# Patient Record
Sex: MALE | Race: WHITE
[De-identification: names, ages, dates, MRNs, and addresses within clinical notes are randomized per-mention and may not be internally consistent; named-entity substitution may affect disease eponyms.]

---

## 2018-04-30 ENCOUNTER — HOSPITAL (OUTPATIENT)
Dept: OTHER | Age: 83
End: 2018-04-30
Attending: SURGERY

## 2018-04-30 LAB
AMORPH SED URNS QL MICRO: ABNORMAL
AMPHETAMINES UR QL SCN>500 NG/ML: NEGATIVE
ANALYZER ANC (IANC): ABNORMAL
ANION GAP SERPL CALC-SCNC: 11 MMOL/L (ref 10–20)
APPEARANCE UR: CLEAR
APTT PPP: 27 SECONDS (ref 22–30)
APTT PPP: NORMAL S
BACTERIA #/AREA URNS HPF: ABNORMAL /HPF
BARBITURATES UR QL SCN>200 NG/ML: NEGATIVE
BASE DEFICIT BLDA-SCNC: ABNORMAL MMOL/L
BASE EXCESS BLDA CALC-SCNC: 1 MMOL/L (ref 0–3)
BASOPHILS # BLD: 0 THOUSAND/MCL (ref 0–0.3)
BASOPHILS NFR BLD: 0 %
BDY SITE: ABNORMAL
BENZODIAZ UR QL SCN>200 NG/ML: NEGATIVE
BILIRUB UR QL: NEGATIVE
BODY TEMPERATURE: 37 DEGREES
BUN SERPL-MCNC: 22 MG/DL (ref 6–20)
BUN/CREAT SERPL: 23 (ref 7–25)
BZE UR QL SCN>150 NG/ML: NEGATIVE
CA-I BLD ISE-SCNC: 1.21 MMOL/L (ref 1.15–1.29)
CA-I BLDA-SCNC: 16 % (ref 15–23)
CALCIUM SERPL-MCNC: 9.5 MG/DL (ref 8.4–10.2)
CANNABINOIDS UR QL SCN>50 NG/ML: NEGATIVE
CAOX CRY URNS QL MICRO: ABNORMAL
CHLORIDE BLD-SCNC: 103 MMOL/L (ref 98–107)
CHLORIDE: 101 MMOL/L (ref 98–107)
CK SERPL-CCNC: 115 UNIT/L (ref 39–308)
CO2 SERPL-SCNC: 31 MMOL/L (ref 21–32)
COHGB MFR BLD: 1.2 %
COLOR UR: COLORLESS
CONDITION: ABNORMAL
CONDITION: ABNORMAL
CREAT SERPL-MCNC: 0.96 MG/DL (ref 0.67–1.17)
DIFFERENTIAL METHOD BLD: ABNORMAL
EOSINOPHIL # BLD: 0.3 THOUSAND/MCL (ref 0.1–0.5)
EOSINOPHIL NFR BLD: 4 %
EPITH CASTS #/AREA URNS LPF: ABNORMAL /[LPF]
ERYTHROCYTE [DISTWIDTH] IN BLOOD: 13.1 % (ref 11–15)
ETHANOL SERPL-MCNC: 240 MG/DL
FATTY CASTS #/AREA URNS LPF: ABNORMAL /[LPF]
GLUCOSE BLD-MCNC: 97 MG/DL (ref 65–99)
GLUCOSE SERPL-MCNC: 94 MG/DL (ref 65–99)
GLUCOSE UR-MCNC: NEGATIVE MG/DL
GRAN CASTS #/AREA URNS LPF: ABNORMAL /[LPF]
HCO3 BLDA-SCNC: 26 MMOL/L (ref 22–28)
HEMATOCRIT: 36.3 % (ref 39–51)
HGB BLD-MCNC: 11.6 GM/DL (ref 13–17)
HGB BLD-MCNC: 11.7 GM/DL (ref 13–17)
HGB UR QL: ABNORMAL
HOROWITZ INDEX BLD+IHG-RTO: 28 %
HYALINE CASTS #/AREA URNS LPF: ABNORMAL /[LPF]
INR PPP: 1.1
KETONES UR-MCNC: NEGATIVE MG/DL
LACTATE BLDA-MCNC: 1.5 MMOL/L
LEUKOCYTE ESTERASE UR QL STRIP: NEGATIVE
LYMPHOCYTES # BLD: 1.4 THOUSAND/MCL (ref 1–4)
LYMPHOCYTES NFR BLD: 18 %
MCH RBC QN AUTO: 29.1 PG (ref 26–34)
MCHC RBC AUTO-ENTMCNC: 32 GM/DL (ref 32–36.5)
MCV RBC AUTO: 91.2 FL (ref 78–100)
METHADONE UR QL SCN>300 NG/ML: NEGATIVE NG/ML
METHGB MFR BLD: 0.8 %
MIXED CELL CASTS #/AREA URNS LPF: ABNORMAL /[LPF]
MONOCYTES # BLD: 0.9 THOUSAND/MCL (ref 0.3–0.9)
MONOCYTES NFR BLD: 11 %
MUCOUS THREADS URNS QL MICRO: ABNORMAL
NEUTROPHILS # BLD: 5.2 THOUSAND/MCL (ref 1.8–7.7)
NEUTROPHILS NFR BLD: 67 %
NEUTS SEG NFR BLD: ABNORMAL %
NITRITE UR QL: NEGATIVE
OPIATES UR QL SCN>300 NG/ML: NEGATIVE
OXYHGB MFR BLD: 96 % (ref 94–98)
PCO2 BLDA: 41 MM HG (ref 35–48)
PCP UR QL SCN>25 NG/ML: NEGATIVE
PERCENT NRBC: ABNORMAL
PH BLDA: 7.42 UNIT (ref 7.35–7.45)
PH UR: 5 UNIT (ref 5–7)
PLATELET # BLD: 244 THOUSAND/MCL (ref 140–450)
PO2 BLDA: 106 MM HG (ref 83–108)
POTASSIUM BLD-SCNC: 4.1 MMOL/L (ref 3.4–5.1)
POTASSIUM SERPL-SCNC: 4.1 MMOL/L (ref 3.4–5.1)
PROT UR QL: NEGATIVE MG/DL
PROTHROMBIN TIME: 10.9 SECONDS (ref 9.7–11.8)
PROTHROMBIN TIME: NORMAL
RBC # BLD: 3.98 MILLION/MCL (ref 4.5–5.9)
RBC #/AREA URNS HPF: ABNORMAL /HPF (ref 0–3)
RBC CASTS #/AREA URNS LPF: ABNORMAL /[LPF]
RENAL EPI CELLS #/AREA URNS HPF: ABNORMAL /[HPF]
SAO2 % BLDA: 98 % (ref 95–99)
SODIUM BLD-SCNC: 140 MMOL/L (ref 135–145)
SODIUM SERPL-SCNC: 139 MMOL/L (ref 135–145)
SP GR UR: <1.005 (ref 1–1.03)
SPECIMEN SOURCE: ABNORMAL
SPERM URNS QL MICRO: ABNORMAL
SQUAMOUS #/AREA URNS HPF: ABNORMAL /HPF (ref 0–5)
T VAGINALIS URNS QL MICRO: ABNORMAL
TRI-PHOS CRY URNS QL MICRO: ABNORMAL
TROPONIN I SERPL HS-MCNC: <0.02 NG/ML
URATE CRY URNS QL MICRO: ABNORMAL
URINE REFLEX: ABNORMAL
URNS CMNT MICRO: ABNORMAL
UROBILINOGEN UR QL: 0.2 MG/DL (ref 0–1)
WAXY CASTS #/AREA URNS LPF: ABNORMAL /[LPF]
WBC # BLD: 7.8 THOUSAND/MCL (ref 4.2–11)
WBC #/AREA URNS HPF: ABNORMAL /HPF (ref 0–5)
WBC CASTS #/AREA URNS LPF: ABNORMAL /[LPF]
YEAST HYPHAE URNS QL MICRO: ABNORMAL
YEAST URNS QL MICRO: ABNORMAL

## 2018-05-01 LAB
ANALYZER ANC (IANC): ABNORMAL
ANION GAP SERPL CALC-SCNC: 10 MMOL/L (ref 10–20)
BASOPHILS # BLD: 0 THOUSAND/MCL (ref 0–0.3)
BASOPHILS NFR BLD: 1 %
BUN SERPL-MCNC: 17 MG/DL (ref 6–20)
BUN/CREAT SERPL: 22 (ref 7–25)
CALCIUM SERPL-MCNC: 8.7 MG/DL (ref 8.4–10.2)
CHLORIDE: 101 MMOL/L (ref 98–107)
CO2 SERPL-SCNC: 29 MMOL/L (ref 21–32)
CREAT SERPL-MCNC: 0.77 MG/DL (ref 0.67–1.17)
DIFFERENTIAL METHOD BLD: ABNORMAL
EOSINOPHIL # BLD: 0.4 THOUSAND/MCL (ref 0.1–0.5)
EOSINOPHIL NFR BLD: 7 %
ERYTHROCYTE [DISTWIDTH] IN BLOOD: 13 % (ref 11–15)
GLUCOSE SERPL-MCNC: 86 MG/DL (ref 65–99)
HEMATOCRIT: 35.2 % (ref 39–51)
HGB BLD-MCNC: 11.3 GM/DL (ref 13–17)
LYMPHOCYTES # BLD: 0.9 THOUSAND/MCL (ref 1–4)
LYMPHOCYTES NFR BLD: 16 %
MCH RBC QN AUTO: 29 PG (ref 26–34)
MCHC RBC AUTO-ENTMCNC: 32.1 GM/DL (ref 32–36.5)
MCV RBC AUTO: 90.5 FL (ref 78–100)
MONOCYTES # BLD: 0.7 THOUSAND/MCL (ref 0.3–0.9)
MONOCYTES NFR BLD: 12 %
NEUTROPHILS # BLD: 3.7 THOUSAND/MCL (ref 1.8–7.7)
NEUTROPHILS NFR BLD: 64 %
NEUTS SEG NFR BLD: ABNORMAL %
PERCENT NRBC: ABNORMAL
PLATELET # BLD: 221 THOUSAND/MCL (ref 140–450)
POTASSIUM SERPL-SCNC: 3.9 MMOL/L (ref 3.4–5.1)
RBC # BLD: 3.89 MILLION/MCL (ref 4.5–5.9)
SODIUM SERPL-SCNC: 136 MMOL/L (ref 135–145)
WBC # BLD: 5.8 THOUSAND/MCL (ref 4.2–11)

## 2018-08-03 LAB
ALBUMIN SERPL-MCNC: 3.4 GM/DL (ref 3.6–5.1)
ALBUMIN/GLOB SERPL: 0.7 {RATIO} (ref 1–2.4)
ALP SERPL-CCNC: 60 UNIT/L (ref 45–117)
ALT SERPL-CCNC: 17 UNIT/L
AMORPH SED URNS QL MICRO: ABNORMAL
ANALYZER ANC (IANC): ABNORMAL
ANION GAP SERPL CALC-SCNC: 12 MMOL/L (ref 10–20)
APPEARANCE UR: CLEAR
APTT PPP: 26 SECONDS (ref 22–30)
APTT PPP: NORMAL S
AST SERPL-CCNC: 21 UNIT/L
BACTERIA #/AREA URNS HPF: ABNORMAL /HPF
BASOPHILS # BLD: 0 THOUSAND/MCL (ref 0–0.3)
BASOPHILS NFR BLD: 1 %
BILIRUB SERPL-MCNC: 0.5 MG/DL (ref 0.2–1)
BILIRUB UR QL: NEGATIVE
BNP SERPL-MCNC: 51 PG/ML
BUN SERPL-MCNC: 13 MG/DL (ref 6–20)
BUN/CREAT SERPL: 15 (ref 7–25)
CALCIUM SERPL-MCNC: 9.2 MG/DL (ref 8.4–10.2)
CAOX CRY URNS QL MICRO: ABNORMAL
CHLORIDE: 100 MMOL/L (ref 98–107)
CO2 SERPL-SCNC: 30 MMOL/L (ref 21–32)
COLOR UR: YELLOW
CREAT SERPL-MCNC: 0.88 MG/DL (ref 0.67–1.17)
D DIMER PPP FEU-MCNC: 1.56 MG/L FEU
DIFFERENTIAL METHOD BLD: ABNORMAL
EOSINOPHIL # BLD: 0.2 THOUSAND/MCL (ref 0.1–0.5)
EOSINOPHIL NFR BLD: 4 %
EPITH CASTS #/AREA URNS LPF: ABNORMAL /[LPF]
ERYTHROCYTE [DISTWIDTH] IN BLOOD: 14 % (ref 11–15)
FATTY CASTS #/AREA URNS LPF: ABNORMAL /[LPF]
GLOBULIN SER-MCNC: 4.6 GM/DL (ref 2–4)
GLUCOSE SERPL-MCNC: 124 MG/DL (ref 65–99)
GLUCOSE UR-MCNC: NEGATIVE MG/DL
GRAN CASTS #/AREA URNS LPF: ABNORMAL /[LPF]
HEMATOCRIT: 36.9 % (ref 39–51)
HGB BLD-MCNC: 11.8 GM/DL (ref 13–17)
HGB UR QL: ABNORMAL
HYALINE CASTS #/AREA URNS LPF: ABNORMAL /[LPF]
INR PPP: 1.1
KETONES UR-MCNC: NEGATIVE MG/DL
LACTATE BLDV-SCNC: 1.8 MMOL/L (ref 0–2)
LACTATE BLDV-SCNC: 1.9 MMOL/L (ref 0–2)
LEUKOCYTE ESTERASE UR QL STRIP: NEGATIVE
LIPASE SERPL-CCNC: 188 UNIT/L (ref 73–393)
LYMPHOCYTES # BLD: 1.3 THOUSAND/MCL (ref 1–4)
LYMPHOCYTES NFR BLD: 20 %
MAGNESIUM SERPL-MCNC: 1.9 MG/DL (ref 1.7–2.4)
MCH RBC QN AUTO: 28.8 PG (ref 26–34)
MCHC RBC AUTO-ENTMCNC: 32 GM/DL (ref 32–36.5)
MCV RBC AUTO: 90 FL (ref 78–100)
MICROSCOPIC (MT): ABNORMAL
MIXED CELL CASTS #/AREA URNS LPF: ABNORMAL /[LPF]
MONOCYTES # BLD: 0.8 THOUSAND/MCL (ref 0.3–0.9)
MONOCYTES NFR BLD: 12 %
MUCOUS THREADS URNS QL MICRO: PRESENT
NEUTROPHILS # BLD: 4.2 THOUSAND/MCL (ref 1.8–7.7)
NEUTROPHILS NFR BLD: 63 %
NEUTS SEG NFR BLD: ABNORMAL %
NITRITE UR QL: NEGATIVE
NRBC (NRBCRE): ABNORMAL
PH UR: 5 UNIT (ref 5–7)
PLATELET # BLD: 277 THOUSAND/MCL (ref 140–450)
POTASSIUM SERPL-SCNC: 3.6 MMOL/L (ref 3.4–5.1)
PROCALCITONIN SERPL IA-MCNC: <0.05 NG/ML
PROT SERPL-MCNC: 8 GM/DL (ref 6.4–8.2)
PROT UR QL: NEGATIVE MG/DL
PROTHROMBIN TIME: 11.1 SECONDS (ref 9.7–11.8)
PROTHROMBIN TIME: NORMAL
RBC # BLD: 4.1 MILLION/MCL (ref 4.5–5.9)
RBC #/AREA URNS HPF: ABNORMAL /HPF (ref 0–3)
RBC CASTS #/AREA URNS LPF: ABNORMAL /[LPF]
RENAL EPI CELLS #/AREA URNS HPF: ABNORMAL /[HPF]
SODIUM SERPL-SCNC: 138 MMOL/L (ref 135–145)
SP GR UR: 1.01 (ref 1–1.03)
SPECIMEN SOURCE: ABNORMAL
SPERM URNS QL MICRO: ABNORMAL
SQUAMOUS #/AREA URNS HPF: ABNORMAL /HPF (ref 0–5)
T VAGINALIS URNS QL MICRO: ABNORMAL
TRI-PHOS CRY URNS QL MICRO: ABNORMAL
TROPONIN I SERPL HS-MCNC: <0.02 NG/ML
TROPONIN I SERPL HS-MCNC: <0.02 NG/ML
URATE CRY URNS QL MICRO: ABNORMAL
URNS CMNT MICRO: ABNORMAL
UROBILINOGEN UR QL: 0.2 MG/DL (ref 0–1)
WAXY CASTS #/AREA URNS LPF: ABNORMAL /[LPF]
WBC # BLD: 6.5 THOUSAND/MCL (ref 4.2–11)
WBC #/AREA URNS HPF: ABNORMAL /HPF (ref 0–5)
WBC CASTS #/AREA URNS LPF: ABNORMAL /[LPF]
YEAST HYPHAE URNS QL MICRO: ABNORMAL
YEAST URNS QL MICRO: ABNORMAL

## 2018-08-04 ENCOUNTER — IMAGING SERVICES (OUTPATIENT)
Dept: OTHER | Age: 83
End: 2018-08-04

## 2018-08-04 ENCOUNTER — HOSPITAL (OUTPATIENT)
Dept: OTHER | Age: 83
End: 2018-08-04
Attending: INTERNAL MEDICINE

## 2018-08-04 LAB
ANALYZER ANC (IANC): ABNORMAL
ANION GAP SERPL CALC-SCNC: 12 MMOL/L (ref 10–20)
ANNOTATION COMMENT IMP: ABNORMAL
BASOPHILS # BLD: 0 THOUSAND/MCL (ref 0–0.3)
BASOPHILS NFR BLD: 1 %
BUN SERPL-MCNC: 14 MG/DL (ref 6–20)
BUN/CREAT SERPL: 17 (ref 7–25)
CALCIUM SERPL-MCNC: 8.4 MG/DL (ref 8.4–10.2)
CHLORIDE: 104 MMOL/L (ref 98–107)
CHOLEST SERPL-MCNC: 130 MG/DL
CHOLEST/HDLC SERPL: 3.4 {RATIO}
CO2 SERPL-SCNC: 28 MMOL/L (ref 21–32)
CREAT SERPL-MCNC: 0.82 MG/DL (ref 0.67–1.17)
DIFFERENTIAL METHOD BLD: ABNORMAL
EOSINOPHIL # BLD: 0.3 THOUSAND/MCL (ref 0.1–0.5)
EOSINOPHIL NFR BLD: 6 %
ERYTHROCYTE [DISTWIDTH] IN BLOOD: 14.1 % (ref 11–15)
GAMMA INTERFERON BACKGROUND BLD IA-ACNC: 0.06 UNIT/ML
GLUCOSE SERPL-MCNC: 96 MG/DL (ref 65–99)
HDLC SERPL-MCNC: 38 MG/DL
HEMATOCRIT: 33 % (ref 39–51)
HGB BLD-MCNC: 10.4 GM/DL (ref 13–17)
LDLC SERPL CALC-MCNC: 81 MG/DL
LYMPHOCYTES # BLD: 0.8 THOUSAND/MCL (ref 1–4)
LYMPHOCYTES NFR BLD: 15 %
M TB IFN-G CD4+ BCKGRND COR BLD-ACNC: 0.09 UNIT/ML
M TB IFN-G CD4+CD8+ BCKGRND COR BLD-ACNC: 0.06 UNIT/ML
MCH RBC QN AUTO: 28.2 PG (ref 26–34)
MCHC RBC AUTO-ENTMCNC: 31.5 GM/DL (ref 32–36.5)
MCV RBC AUTO: 89.4 FL (ref 78–100)
MITOGEN IGNF BCKGRD COR BLD-ACNC: 0.1 UNIT/ML
MONOCYTES # BLD: 0.9 THOUSAND/MCL (ref 0.3–0.9)
MONOCYTES NFR BLD: 15 %
NEUTROPHILS # BLD: 3.5 THOUSAND/MCL (ref 1.8–7.7)
NEUTROPHILS NFR BLD: 63 %
NEUTS SEG NFR BLD: ABNORMAL %
NONHDLC SERPL-MCNC: 92 MG/DL
NRBC (NRBCRE): ABNORMAL
PLATELET # BLD: 230 THOUSAND/MCL (ref 140–450)
POTASSIUM SERPL-SCNC: 3.7 MMOL/L (ref 3.4–5.1)
RBC # BLD: 3.69 MILLION/MCL (ref 4.5–5.9)
SODIUM SERPL-SCNC: 140 MMOL/L (ref 135–145)
TRIGLYCERIDE (TRIGP): 55 MG/DL
WBC # BLD: 5.5 THOUSAND/MCL (ref 4.2–11)

## 2018-08-05 LAB
ANALYZER ANC (IANC): ABNORMAL
ANION GAP SERPL CALC-SCNC: 11 MMOL/L (ref 10–20)
BASOPHILS # BLD: 0 THOUSAND/MCL (ref 0–0.3)
BASOPHILS NFR BLD: 1 %
BUN SERPL-MCNC: 14 MG/DL (ref 6–20)
BUN/CREAT SERPL: 18 (ref 7–25)
CALCIUM SERPL-MCNC: 8.5 MG/DL (ref 8.4–10.2)
CHLORIDE: 101 MMOL/L (ref 98–107)
CO2 SERPL-SCNC: 30 MMOL/L (ref 21–32)
CREAT SERPL-MCNC: 0.78 MG/DL (ref 0.67–1.17)
DIFFERENTIAL METHOD BLD: ABNORMAL
EOSINOPHIL # BLD: 0.3 THOUSAND/MCL (ref 0.1–0.5)
EOSINOPHIL NFR BLD: 5 %
ERYTHROCYTE [DISTWIDTH] IN BLOOD: 14.3 % (ref 11–15)
GLUCOSE SERPL-MCNC: 84 MG/DL (ref 65–99)
HEMATOCRIT: 34.6 % (ref 39–51)
HGB BLD-MCNC: 11.1 GM/DL (ref 13–17)
LYMPHOCYTES # BLD: 0.7 THOUSAND/MCL (ref 1–4)
LYMPHOCYTES NFR BLD: 12 %
MAGNESIUM SERPL-MCNC: 1.7 MG/DL (ref 1.7–2.4)
MCH RBC QN AUTO: 28.5 PG (ref 26–34)
MCHC RBC AUTO-ENTMCNC: 32.1 GM/DL (ref 32–36.5)
MCV RBC AUTO: 88.7 FL (ref 78–100)
MONOCYTES # BLD: 0.7 THOUSAND/MCL (ref 0.3–0.9)
MONOCYTES NFR BLD: 11 %
NEUTROPHILS # BLD: 4.3 THOUSAND/MCL (ref 1.8–7.7)
NEUTROPHILS NFR BLD: 71 %
NEUTS SEG NFR BLD: ABNORMAL %
NRBC (NRBCRE): ABNORMAL
PLATELET # BLD: 267 THOUSAND/MCL (ref 140–450)
POTASSIUM SERPL-SCNC: 4.1 MMOL/L (ref 3.4–5.1)
RBC # BLD: 3.9 MILLION/MCL (ref 4.5–5.9)
SODIUM SERPL-SCNC: 138 MMOL/L (ref 135–145)
WBC # BLD: 6 THOUSAND/MCL (ref 4.2–11)

## 2018-08-06 LAB
CRP SERPL-MCNC: 4.6 MG/DL
ERYTHROCYTE [SEDIMENTATION RATE] IN BLOOD BY WESTERGREN METHOD: 57 MM/HR (ref 0–20)
HIV ANTIGEN/ANTIBODY SCREEN: NONREACTIVE

## 2019-05-31 NOTE — NUR
PT BIBRA FROM HOME FOR FLU LIKE SYMPTOMS. PER EMS, PT DX WITH UTI. PT AOX4. PT 
C/O SOB. PT ON MONITOR IN BED 4. GRANDSON AT BEDSIDE. WILL CONTINUE TO MONITOR.

## 2019-06-01 NOTE — NUR
RN CLOSING NOTES



PATIENT IN BED RESTING COMFORTABLY. PATIENT ABLE TO TOLERATE MEALS AND MEDS WELL. NO PAIN OR 
ACUTE DISTRESS AT THIS TIME. RESPIRATION EVEN AND UNLABORED. SKIN IS DRY WARM TO TOUCH. IV 
ACCESS ON LEFT FOREARM INTACT AND PATENT. PATIENT REMAINED STABLE THROUGHOUT THE SHIFT. ALL 
NEEDS ANTICIPATED. KEPT CLEAN AND DRY. CALL LIGHT WITHIN REACHED. BED LOCKED AND IN LOWEST 
POSITION. SAFETY MEASURES OBSERVED. ENDORSED TO PM NURSE FOR TREVOR.

## 2019-06-01 NOTE — NUR
RN NOTES:

-AT 0405 RECEIVED ENDORSEMENT FROM [facility]/RN/ER,RECEIVED ZOFRAN, N/S 1,000 ML,LEVAQUIN 750 
MG, ATROVENT AND VENTOLIN.

- ADMITTED 85 Y.O., MALE, [place] SPEAKING,  AT 0530 VIA GURNEY FROM HOME, WITH C/O FLU 
LIKE SYMPTOM X 3 DAYS AND PAINFUL URINATION X 1 WEEK, DX: COPD EXACERBATION, ON O2 AT 
2L/MIN, SPO2-95% , A/O 3-4, ORIENTED TO UNIT AND STAFF, FALL,SAFETY AND ASPIRATION 
PRECAUTION OBSERVED, BED LOW AND LOCKED, CALL LIGHT WITHIN EASY REACH, ON CARDIAC DIET, IV 
CANNULA LAC G#20, FOR BLOOD TEST CBC,BMP, MG. WHEEZING NOTED ON BOTH UPPER LUNG FIELD, HE 
HAS SOB ON EXERTION.ENDORSED FOR CONTINUITY OF CARE.

## 2019-06-01 NOTE — NUR
RN OPENING NOTES



RECEIVED PATIENT IN BED SLEEPING COMFORTABLY. EASILY AROUSABLE. ALERT AND ORIENTED X3-4. 
Kinyarwanda SPEAKING. ABLE TO MAKE NEEDS KNOWN. NO PAIN OR ACUTE DISTRESS AT THIS TIME. 
RESPIRATION EVEN AND UNLABORED. NO SOB. SKIN IS DRY WARM TO TOUCH. IV ACCESS ON LEFT AC 
INTACT AND PATENT. FLUSHING WELL. ALL NEEDS ANTICIPATED. KEPT CLEAN AND DRY. CALL LIGHT 
WITHIN REACHED. SAFETY MEASURES OBSERVED. BED LOCKED AND IN LOWEST POSITION. PLAN OF CARE 
DISCUSSED. WILL CONTINUE TO MONITOR.

## 2019-06-01 NOTE — NUR
MS1 RN NOTES

RECEIVED ON BED ON SITTING POSITION,BREATHING NON LABORED,O2 IN USED AT 2L/NC TO KEEP O2 SAT 
ABOVE 90%.SALINE LOCK LEFT FORE ARM INTACT AND PATENT.SPEAK [place],FAMILY MEMBERS AT 
BEDSIDE.RT AT BEDSIDE ADMINISTERING BREATHING TREATMENT AS SCHEDULED.IN NO ACUTE 
DISTRESS.WILL CONTINUE TO MONITOR STATUS.

## 2019-06-02 NOTE — NUR
MS 1 RN NOTES

TRANSFERRED TO ROOM 109 TO ACCOMMODATE FAMILY MEMBER.NO SIGNIFICANT CHANGE IN 
STATUS.BREATHING TREATMENT TOLERATED WELL.NO EPISODE OF SOB NOTED.IVF LEVAQUIN INFUSING AT 
THIS TIME VIA IV PUMP,SITE REMAINS PATENT.NO FALL,NO INJURY.CALL LIGHT IN REACH,NEEDS 
ATTENDED.WILL ENDORSE TO DAY NURSE FOR TREVOR.

## 2019-06-02 NOTE — NUR
MS RN NOTE



PATIENT REPORT GIVEN BEDSIDE. PATIENT DENIES ANY S/S OF DISTRESS. NO C/P/SOB//PAIN NOTED. 
PATIENT SITTING UP IN BED A/0 X 4. PATIENT WATCHING TV. PATIENT HAS NO REQUESTS AT THIS 
TIME. SAEFTY PRECAUTIONS IN PLACE SIDE RAILS UP X 2, BED LOWEST LOCKED POSITION. RN WILL 
CONTINUE TO MONITOR FOR CHANGES.

## 2019-06-03 NOTE — NUR
MS RN NOTES 



RECEIVED PATIENT AWAKE IN BED WITH NO DISTRESS NOTED. CALL LIGHT WITHIN REACH. PERIPHERAL 
LINE INTACT AND PATENT. NO C/O PAIN OR DISCOMFORT. ENCOURAGED USE OF CALL LIGHT FOR 
ASSISTANCE AND VERBALIZED GOOD UNDERSTANDING. BED IN LOW LOCK SETTING. BED ALARM ON AND 
FUNCTIONING PROPERLY. ROOM FREE OF CALL LIGHT AND BELONGINGS KEPT NEAR BEDSIDE. WILL 
CONTINUE TO MONITOR.

## 2019-06-03 NOTE — NUR
PT REFUSED

-------------------------------------------------------------------------------

Addendum: 06/03/19 at 0213 by SUZIE LOWE

-------------------------------------------------------------------------------

Amended: Links added.

## 2019-06-03 NOTE — NUR
MS/RN  NOTE



PER PATIENT REQUEST [facility] DR MATUTE ORDER. NOTED AND 
CARRIED OUT.

## 2019-06-03 NOTE — NUR
MS/RN  NOTE



PATIENT IS SEEN BY DR MATUTE WITH NEW ORDER FOR RESTORIL 15 MG HS PRN. NOTED AND CARRIED 
OUT.

## 2019-06-03 NOTE — NUR
MS/RN  CLOSING NOTE



THE PATIENT ALERT AND ORIENTED X3. RECEIVING OXYGEN 2L/MIN VIA NASAL CANNULA AND SATURATION 
IS AT 95%. DENIES SOB. RESPIRATION REGULAR AND UNLABORED. DENIES PAIN. THE PATIENT IS IN NO 
APPARENT DISTRESS. LFA G 22 PATENT AND SALINE LOCKED. BED LOW AND LOCKED. SIDE RAILS UP X3. 
CALL LIGHT WITHIN REACH. BED ALARM ON. WILL ENDORSE TO NIGHT SHIFT.

## 2019-06-03 NOTE — NUR
MS RN NOTE



BOTH PT IV SITES LEAKING, 22 G IN L HAND/ 20 G IN RFA, PLACED A NEW IV 20 G IN SAROJ. NO S/S 
OF INFECTION/INFILTRATION.

## 2019-06-03 NOTE — NUR
MS/RN  OPENING NOTE



THE PATIENT IS RECEIVED IN BED. ALERT AND ORIENTED X3. ABLE TO MAKE NEEDS KNOWN VERBALLY. 
RECEIVING OXYGEN AT 2L/MIN VIA NASAL CANNULA AND DENIES SOB. RESPIRATION REGULAR AND 
UNLABORED. DENIES PAIN. THE PATIENT IS IN NO APPARENT DISTRESS. LFA G 22 PATENT AND SALINE 
LOCKED. BED LOW AND LOCKED. SIDE RAILS UP X3. CALL LIGHT WITHIN REACH. WILL CONTINUE TO 
MONITOR.

## 2019-06-04 NOTE — NUR
MS RN NOTES 



PATIENT ASLEEP IN BED WITH NO DISTRESS NOTED. CALL LIGHT WITHIN REACH. PERIPHERAL LINE 
INTACT AND PATENT. ALL DUE MEDS GIVEN AS ORDERED WITH NO ASE NOTED. NO C/O PAIN OR 
DISCOMFORT. BED IN LOW LOCK SETTING. BED ALARM ON AND FUNCTIONING PROPERLY. ALL BELONGINGS 
KEPT NEAR BEDSIDE. WILL ENDORSE TO ONCOMING SHIFT.

## 2019-06-04 NOTE — NUR
MS RN OPENING NOTES



Received patient A/Ox3, awake on Wolf's position on bed watching TV. With O2 inhalation 
via NC @ 2LPM, no SOB/respiratory distress noted. Patient able to communicate needs, denies 
discomfort at this time. With patent peripheral IV line LFA G#22, SL, no s/sx of 
infiltration noted. On fall precautions, call light within easy reach. Will continue to 
monitor accordingly.

## 2019-06-05 NOTE — NUR
RN OPEN NOTES



RECEIVED PATIENT AWAKE IN BED. A/OX3. NO SIGNS OF DISTRESS OR DISCOMFORT. BREATHING EVEN AND 
UNLABORED. ON 2LPM O2 VIA NC. IV ACCESS IN LFA WITH LEVAQUIN INFUSING, PATENT AND INTACT, NO 
SIGNS OF REDNESS OR INFILTRATION. ALL NEEDS MET. NO SIGNIFICANT CHANGES THROUGH THE NIGHT. 
BED IN LOW LOCKED POSITION WITH SIDE RAILS X2. CALL LIGHT WITHIN REACH. PATIENT ADVISED TO 
USE CALL LIGHT FOR ASSISTANCE. WILL ENDORSE TO AM SHIFT FOR TREVOR.

## 2019-06-05 NOTE — NUR
MS RN CLOSING NOTES



Patient asleep comfortably on bed. No SOB/respiratory distress noted. All due meds given as 
ordered. No new unusualities noted. Afebrile the whole shift. On fall precautions. Call 
light within easy reach. Endorsed to the next shift.

## 2019-06-06 NOTE — NUR
MS RN



RECEIVE PT IN BED A/O X 3, RESPIRATIONS EVEN AND UNLABORED,STABLE, NO S/S OF DISTRESS, 
SAFETY MEASURES IN PLACE. WILL CONTINUE TO MONITOR

## 2019-06-06 NOTE — NUR
RN OPENING NOTES



RECEIVED PATIENT IN BED. PATIENT AWAKE AND ABLE TO MAKE NEEDS KNOWN. PATIENT ALERT AND 
ORIENTED X4. RESPIRATION EVEN AND UNLABORED. ON O2 VIA NASAL CANNULA. TOLERATED WELL. NO 
SOB. NO PAIN OR ACUTE DISTRESS AT THIS TIME. SKIN INTACT. NOTED WITH IV ACCESS ON LEFT 
FOREARM #22G. PATENT AND FLUSHING WELL. ALL NEEDS ANTICIPATED. KEPT CLEAN AND DRY. CALL 
LIGHT WITHIN REACHED. BED LOCKED AND IN LOWEST POSITION. SAFETY MEASURES OBSERVED. BED ALARM 
ARMED. PERLITA OF CARE DISCUSSED. WILL CONTINUE TO MONITOR CLOSELY.

## 2019-06-06 NOTE — NUR
RN CLOSING NOTES



PATIENT IN BED AWAKE AND ABLE TO MAKE NEEDS KNOWN. PATIENT IS A/O X4. NO PAIN OR ACUTE 
DISTRESS AT THIS TIME. RESPIRATION EVEN AND UNLABORED. SKIN IS DRY WARM TO TOUCH. IV ACCESS 
ON LEFT FOREARM #22G INTACT AND PATENT. FLUSHING WELL. PATIENT WAS INFORMED ABOUT THE EGD 
ORDER FOR TOMORROW FROM DR. CORREA. PATIENT WAS ABLE TO UNDERSTAND THE PROCEDURE AND SIGNED 
THE CONSENT FORM. SIGNED CONSENT FORM PLACE IN THE CHART THAT CHARGE NURSE CO-SIGNED. 
PATIENT WAS ALSO INFORMED ABOUT BEING NPO AFTER MIDNIGHT. PATIENT WAS ABLE TO UNDERSTAND. 
ALL NEEDS ANTICIPATED. KEPT CLEAN AND DRY. CALL LIGHT WITHIN REACHED. BED LOCKED AND IN 
LOWEST POSITION. BED ALARM ARMED. WILL CONTINUE TO MONITOR. ENDORSED TO PM NURSE FOR TREVOR.

## 2019-06-07 NOTE — NUR
MS RN 



ASLEEP AND EASILY AWAKEN, RESPIRATION EVEN AND UNLABORED, MAINTAINS NPO MIDNIGHT,  NO C/O OF 
PAIN. KEPT CLEAN AND DRY AND COMFORTABLE. NEEDS ATTENDED AND ANTICIPATED, NURSING CARE 
RENDERED, OFFLOAD HEELS AND ELBOWS AT ALL TIMES.  STABLE AND NO DISTRESS,  NO ANESTHESIA 
SIGNED PT WANTS TO SIGN LATER AM BECAUSE HE SAID HE IS SLEEPING AND WANTED TO REST. SAFETY 
MEASURES AT ALL TIMES. ENDORSE TO THE NEXT SHIFT.

## 2019-06-07 NOTE — NUR
RN MS OPENING NOTES

 RECEIVED PATIENT IN BED AWAKE ALERT AND ORIENTED X4, ABLE TO MAKE SIMPLE NEEDS KNOWN, 
RESPIRATIONS EVEN AND UNLABORED WITH EQUAL RISE AND FALL OF CHEST, DENIES ANY PAIN OR 
DISCOMFORT AT THIS TIME,  LEFT FA #22 G INTACT AND PATENT, NO REDNESS, NO INFILTRATION 
PRESENT, ORIENTED TO STAFF AND CALL LIGHT AND KEPT WITHIN REACH, SAFETY PRECAUTIONS IN 
PLACE, LOW BED AND LOCKED, PATIENT IS SCHEDULED FOR DISCHARGE HOME AWAITING FOR GRANDSON FOR 
PICK, WILL CONTINUE TO ADDRESS NEEDS. PATIENT REMAINS COMFORTABLE AT THIS TIME.

## 2019-06-07 NOTE — NUR
RN MS NOTES

 PATIENT RESPONSIBLE PARTY MARII CONLEY HERE TO  PATIENT PHONE NUMBER , 
DISCHARGED/PLAN OF CARE DISCUSSED WITH MARII AND PATIENT, SERGIOKENNEDY SIGNED DISCHARGE PAPERWORK, 
DISCHARGED TO HOME PER FAMILY WILL HAVE HOME HEALTH AT HOME, TRANSPORTATION PROVIDED BY 
[employer] PERSON[employer] CAR, PRESCRIPTIONS GIVEN, DISCHARGE COPIES AND INSTRUCTIONS GIVEN IV AND 
ID BAND REMOVED, BELONGINGS LIST DONE, PATIENT SAFELY DISCHARGED VIA W/C ACCOMPANIED BY 
STAFF WHEN DISCHARGED LEFT IN STABLE CONDITION.

## 2019-06-07 NOTE — NUR
Patient cleared for d/c by MD. All discharge paperwork prepared including prescription and 
med reconciliation.Patient reused to take d/c pictures. Patient will be picked up by family 
member from 7-8pm. Will endorse to next shift .

## 2020-01-31 ENCOUNTER — HOSPITAL ENCOUNTER (INPATIENT)
Dept: HOSPITAL 54 - MEDSG2 | Age: 85
LOS: 7 days | Discharge: HOME | DRG: 189 | End: 2020-02-07
Attending: LEGAL MEDICINE | Admitting: LEGAL MEDICINE
Payer: COMMERCIAL

## 2020-01-31 VITALS — WEIGHT: 140 LBS | HEIGHT: 68 IN | BODY MASS INDEX: 21.22 KG/M2

## 2020-01-31 VITALS — SYSTOLIC BLOOD PRESSURE: 128 MMHG | DIASTOLIC BLOOD PRESSURE: 74 MMHG

## 2020-01-31 VITALS — DIASTOLIC BLOOD PRESSURE: 74 MMHG | SYSTOLIC BLOOD PRESSURE: 128 MMHG

## 2020-01-31 DIAGNOSIS — T38.0X5A: ICD-10-CM

## 2020-01-31 DIAGNOSIS — E43: ICD-10-CM

## 2020-01-31 DIAGNOSIS — J47.0: ICD-10-CM

## 2020-01-31 DIAGNOSIS — R63.0: ICD-10-CM

## 2020-01-31 DIAGNOSIS — D72.829: ICD-10-CM

## 2020-01-31 DIAGNOSIS — E86.0: ICD-10-CM

## 2020-01-31 DIAGNOSIS — F41.1: ICD-10-CM

## 2020-01-31 DIAGNOSIS — N40.0: ICD-10-CM

## 2020-01-31 DIAGNOSIS — J20.9: ICD-10-CM

## 2020-01-31 DIAGNOSIS — J44.1: ICD-10-CM

## 2020-01-31 DIAGNOSIS — R53.81: ICD-10-CM

## 2020-01-31 DIAGNOSIS — F03.90: ICD-10-CM

## 2020-01-31 DIAGNOSIS — K21.9: ICD-10-CM

## 2020-01-31 DIAGNOSIS — F32.9: ICD-10-CM

## 2020-01-31 DIAGNOSIS — R64: ICD-10-CM

## 2020-01-31 DIAGNOSIS — J96.02: ICD-10-CM

## 2020-01-31 DIAGNOSIS — I34.0: ICD-10-CM

## 2020-01-31 DIAGNOSIS — D64.9: ICD-10-CM

## 2020-01-31 DIAGNOSIS — G93.41: ICD-10-CM

## 2020-01-31 DIAGNOSIS — G62.9: ICD-10-CM

## 2020-01-31 DIAGNOSIS — I10: ICD-10-CM

## 2020-01-31 DIAGNOSIS — J96.01: Primary | ICD-10-CM

## 2020-01-31 DIAGNOSIS — E87.6: ICD-10-CM

## 2020-01-31 DIAGNOSIS — I25.10: ICD-10-CM

## 2020-01-31 DIAGNOSIS — M19.90: ICD-10-CM
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
## 2020-01-31 LAB
ALBUMIN SERPL BCP-MCNC: 2.3 G/DL (ref 3.4–5)
ALP SERPL-CCNC: 58 U/L (ref 46–116)
ALT SERPL W P-5'-P-CCNC: 29 U/L (ref 12–78)
AST SERPL W P-5'-P-CCNC: 17 U/L (ref 15–37)
BASE EXCESS BLDA CALC-SCNC: -0.8 MMOL/L
BASOPHILS # BLD AUTO: 0 /CMM (ref 0–0.2)
BASOPHILS NFR BLD AUTO: 0 % (ref 0–2)
BILIRUB SERPL-MCNC: 0.2 MG/DL (ref 0.2–1)
BUN SERPL-MCNC: 27 MG/DL (ref 7–18)
CALCIUM SERPL-MCNC: 8.5 MG/DL (ref 8.5–10.1)
CHLORIDE SERPL-SCNC: 108 MMOL/L (ref 98–107)
CO2 SERPL-SCNC: 29 MMOL/L (ref 21–32)
CREAT SERPL-MCNC: 1.1 MG/DL (ref 0.6–1.3)
DO-HGB MFR BLDA: 92.6 MMHG
EOSINOPHIL NFR BLD AUTO: 0.3 % (ref 0–6)
GLUCOSE SERPL-MCNC: 194 MG/DL (ref 74–106)
HCT VFR BLD AUTO: 28 % (ref 39–51)
HGB BLD-MCNC: 8.9 G/DL (ref 13.5–17.5)
INHALED O2 CONCENTRATION: 30 %
LYMPHOCYTES NFR BLD AUTO: 0.5 /CMM (ref 0.8–4.8)
LYMPHOCYTES NFR BLD AUTO: 4.8 % (ref 20–44)
MCHC RBC AUTO-ENTMCNC: 32 G/DL (ref 31–36)
MCV RBC AUTO: 92 FL (ref 80–96)
MONOCYTES NFR BLD AUTO: 0.7 /CMM (ref 0.1–1.3)
MONOCYTES NFR BLD AUTO: 6.1 % (ref 2–12)
NEUTROPHILS # BLD AUTO: 10 /CMM (ref 1.8–8.9)
NEUTROPHILS NFR BLD AUTO: 88.8 % (ref 43–81)
NT-PROBNP SERPL-MCNC: 1962 PG/ML (ref 0–125)
PCO2 TEMP ADJ BLDA: 36.8 MMHG (ref 35–45)
PH TEMP ADJ BLDA: 7.42 [PH] (ref 7.35–7.45)
PLATELET # BLD AUTO: 213 /CMM (ref 150–450)
PO2 TEMP ADJ BLDA: 78.1 MMHG (ref 75–100)
POTASSIUM SERPL-SCNC: 3 MMOL/L (ref 3.5–5.1)
PROT SERPL-MCNC: 5.9 G/DL (ref 6.4–8.2)
RBC # BLD AUTO: 3.05 MIL/UL (ref 4.5–6)
SAO2 % BLDA: 95.2 % (ref 92–98.5)
SODIUM SERPL-SCNC: 144 MMOL/L (ref 136–145)
VENTILATION MODE VENT: (no result)
WBC NRBC COR # BLD AUTO: 11.2 K/UL (ref 4.3–11)

## 2020-01-31 PROCEDURE — G0378 HOSPITAL OBSERVATION PER HR: HCPCS

## 2020-01-31 RX ADMIN — ALBUTEROL SULFATE SCH MG: 2.5 SOLUTION RESPIRATORY (INHALATION) at 19:45

## 2020-01-31 RX ADMIN — QUETIAPINE SCH MG: 25 TABLET, FILM COATED ORAL at 21:51

## 2020-01-31 RX ADMIN — Medication SCH MG: at 19:45

## 2020-01-31 RX ADMIN — DEXTROSE AND SODIUM CHLORIDE PRN MLS/HR: 5; 900 INJECTION, SOLUTION INTRAVENOUS at 17:41

## 2020-01-31 RX ADMIN — DEXTROSE MONOHYDRATE SCH MLS/HR: 50 INJECTION, SOLUTION INTRAVENOUS at 17:41

## 2020-01-31 RX ADMIN — TRAZODONE HYDROCHLORIDE SCH MG: 50 TABLET ORAL at 21:50

## 2020-01-31 NOTE — NUR
MS RN OPENING NOTES



RECEIVED PATIENT VIA W/C DIRECT ADMIT FROM DR. MATUTE'S OFFICE DUE TO C/O SOB WITH DX OF 
COPD EXACERBATION. PATIENT ALERT AND ORIENTED X 2-3. ON 02 @ 3L/MIN VIA NC. HOB ELEVATED. 
DENIES ANY C/O PAIN NOR DISCOMFORT AT THIS TIME. ORIENTED PATIENT TO ROOM, CALL LIGHT AND 
UNIT. PATIENT REFUSED SKIN BODY ASSESSMENT AND DOES NOT WANT TO CHANGE INTO A GOWN. PATIENT 
PREFERS TO WEAR HIS OWN CLOTHING DESPITE OF EDUCATION AND RISKS EXPLAINED. INITIATED IV 
ACCESS TO RT HAND # 20 INTACT AND PATENT TO WELL WITH GOOD BLOOD RETURN.  BED IN LOWEST 
POSITION, LOCKED. BED ALARM ON. CALL LIGHT WITHIN REACH.

## 2020-01-31 NOTE — NUR
MS RN CLOSING NOTES



PATIENT RESTING COMFORTABLY IN BED. WATCHING TV. ON 02 @ 3L/MIN VIA NC. HOB ELEVATED. DENIES 
ANY C/O PAIN NOR DISCOMFORT AT THIS TIME. RT HAND # 20 INTACT AND PATENT INFUSING D5 NS @ 75 
ML/HR BRITTANY WELL.  BED IN LOWEST POSITION, LOCKED. BED ALARM ON. CALL LIGHT WITHIN REACH. IN 
NO APPARENT DISTRESS.

## 2020-02-01 VITALS — DIASTOLIC BLOOD PRESSURE: 86 MMHG | SYSTOLIC BLOOD PRESSURE: 139 MMHG

## 2020-02-01 VITALS — SYSTOLIC BLOOD PRESSURE: 137 MMHG | DIASTOLIC BLOOD PRESSURE: 83 MMHG

## 2020-02-01 VITALS — DIASTOLIC BLOOD PRESSURE: 71 MMHG | SYSTOLIC BLOOD PRESSURE: 129 MMHG

## 2020-02-01 LAB
APPEARANCE UR: CLEAR
BASOPHILS # BLD AUTO: 0 /CMM (ref 0–0.2)
BASOPHILS NFR BLD AUTO: 0 % (ref 0–2)
BILIRUB UR QL STRIP: NEGATIVE
BUN SERPL-MCNC: 28 MG/DL (ref 7–18)
CALCIUM SERPL-MCNC: 7.9 MG/DL (ref 8.5–10.1)
CHLORIDE SERPL-SCNC: 109 MMOL/L (ref 98–107)
CO2 SERPL-SCNC: 26 MMOL/L (ref 21–32)
COLOR UR: (no result)
CREAT SERPL-MCNC: 1 MG/DL (ref 0.6–1.3)
EOSINOPHIL NFR BLD AUTO: 0 % (ref 0–6)
GLUCOSE SERPL-MCNC: 193 MG/DL (ref 74–106)
GLUCOSE UR STRIP-MCNC: NEGATIVE MG/DL
HCT VFR BLD AUTO: 28 % (ref 39–51)
HGB BLD-MCNC: 8.9 G/DL (ref 13.5–17.5)
HGB UR QL STRIP: NEGATIVE ERY/UL
KETONES UR STRIP-MCNC: NEGATIVE MG/DL
LEUKOCYTE ESTERASE UR QL STRIP: NEGATIVE
LYMPHOCYTES NFR BLD AUTO: 0.4 /CMM (ref 0.8–4.8)
LYMPHOCYTES NFR BLD AUTO: 4.7 % (ref 20–44)
MCHC RBC AUTO-ENTMCNC: 32 G/DL (ref 31–36)
MCV RBC AUTO: 91 FL (ref 80–96)
MONOCYTES NFR BLD AUTO: 0.1 /CMM (ref 0.1–1.3)
MONOCYTES NFR BLD AUTO: 1.4 % (ref 2–12)
NEUTROPHILS # BLD AUTO: 8.8 /CMM (ref 1.8–8.9)
NEUTROPHILS NFR BLD AUTO: 93.9 % (ref 43–81)
NITRITE UR QL STRIP: NEGATIVE
PH UR STRIP: 6 [PH] (ref 5–8)
PLATELET # BLD AUTO: 208 /CMM (ref 150–450)
POTASSIUM SERPL-SCNC: 3.9 MMOL/L (ref 3.5–5.1)
PROT UR QL STRIP: NEGATIVE MG/DL
RBC # BLD AUTO: 3.01 MIL/UL (ref 4.5–6)
SODIUM SERPL-SCNC: 143 MMOL/L (ref 136–145)
UROBILINOGEN UR STRIP-MCNC: 0.2 EU/DL
WBC NRBC COR # BLD AUTO: 9.4 K/UL (ref 4.3–11)

## 2020-02-01 RX ADMIN — ALBUTEROL SULFATE SCH MG: 2.5 SOLUTION RESPIRATORY (INHALATION) at 20:01

## 2020-02-01 RX ADMIN — FLUTICASONE FUROATE AND VILANTEROL TRIFENATATE SCH EACH: 100; 25 POWDER RESPIRATORY (INHALATION) at 08:23

## 2020-02-01 RX ADMIN — DEXTROSE MONOHYDRATE SCH MLS/HR: 50 INJECTION, SOLUTION INTRAVENOUS at 17:04

## 2020-02-01 RX ADMIN — PANTOPRAZOLE SODIUM SCH MG: 40 TABLET, DELAYED RELEASE ORAL at 08:23

## 2020-02-01 RX ADMIN — Medication SCH MG: at 08:24

## 2020-02-01 RX ADMIN — ALBUTEROL SULFATE SCH MG: 2.5 SOLUTION RESPIRATORY (INHALATION) at 13:33

## 2020-02-01 RX ADMIN — GABAPENTIN SCH MG: 300 CAPSULE ORAL at 08:23

## 2020-02-01 RX ADMIN — MELOXICAM SCH MG: 7.5 TABLET ORAL at 08:23

## 2020-02-01 RX ADMIN — QUETIAPINE SCH MG: 25 TABLET, FILM COATED ORAL at 21:37

## 2020-02-01 RX ADMIN — MONTELUKAST SODIUM SCH MG: 10 TABLET, FILM COATED ORAL at 08:23

## 2020-02-01 RX ADMIN — Medication SCH MG: at 13:33

## 2020-02-01 RX ADMIN — Medication SCH MG: at 20:01

## 2020-02-01 RX ADMIN — DOXYCYCLINE HYCLATE SCH MG: 100 TABLET, COATED ORAL at 21:37

## 2020-02-01 RX ADMIN — MEMANTINE HYDROCHLORIDE SCH MG: 5 TABLET ORAL at 08:23

## 2020-02-01 RX ADMIN — DEXTROSE AND SODIUM CHLORIDE PRN MLS/HR: 5; 900 INJECTION, SOLUTION INTRAVENOUS at 05:33

## 2020-02-01 RX ADMIN — ALBUTEROL SULFATE SCH MG: 2.5 SOLUTION RESPIRATORY (INHALATION) at 02:16

## 2020-02-01 RX ADMIN — ESCITALOPRAM OXALATE SCH MG: 10 TABLET, FILM COATED ORAL at 08:23

## 2020-02-01 RX ADMIN — TRAZODONE HYDROCHLORIDE SCH MG: 50 TABLET ORAL at 21:37

## 2020-02-01 RX ADMIN — DEXTROSE AND SODIUM CHLORIDE PRN MLS/HR: 5; 900 INJECTION, SOLUTION INTRAVENOUS at 13:26

## 2020-02-01 RX ADMIN — Medication SCH MG: at 02:16

## 2020-02-01 RX ADMIN — FAMOTIDINE SCH MG: 20 TABLET, FILM COATED ORAL at 08:23

## 2020-02-01 RX ADMIN — ALBUTEROL SULFATE SCH MG: 2.5 SOLUTION RESPIRATORY (INHALATION) at 08:24

## 2020-02-01 RX ADMIN — VITAMIN D, TAB 1000IU (100/BT) SCH UNIT: 25 TAB at 08:23

## 2020-02-01 NOTE — NUR
MS/RN   End note



Patient has refused to be turned and repositioned throughout shift, and to removed clothing 
to wear gown. Educated as to the importance of turning to prevent skin breakdown and wounds 
forming, but still refusing. Asked grandson to explain to patient, but continues to refuse.

All needs attended, medications given as ordered. Will endorse to night shift.

## 2020-02-01 NOTE — NUR
MS/RN   PT



Seen by PT - able to stand and take couple of steps with assistance but unsafe to ambulate 
without help.

## 2020-02-01 NOTE — NUR
RN NOTES



ALL NEEDS ATTENDED AND MET, ABLE TO REST AND SLEPT AT INTERVALS, SAFETY MEASURES IN PLACE, 
NO SIGNS OF ACUTE DISTRESS NOTED, WILL ENDORSE TO AM NURSE FOR CONTINUITY OF CARE.

## 2020-02-01 NOTE — NUR
MS/RN   Patient received



Patient from night shift.

Turkish speaking only,  at bedside providing translation. Denies any pain or 
discomfort. 3l oxygen via nasal, saturation 98%, blood pressure stable. No needs at this 
time. Will continue to monitor and ensure safety.

## 2020-02-01 NOTE — NUR
MS/RN   S/B Zachariah Borden



Seen by NP - to continue with current plan of care, including solu-medrol, IVF and IVAB. 
Morning blood draws ordered.

## 2020-02-01 NOTE — NUR
MS RN NOTES



RECEIVED PT IN BED AWAKE AND ABLE TO MAKE NEEDS KNOWN.  PT A/O X2-3.  RESPIRATIONS EVEN AND 
UNLABORED WITH NO S/S OF ACUTE DISTRESS OR SOB NOTED.  NO COMPLAINTS OF PAIN AT THIS TIME.  
PT NOTED WITH RHAND @22G PATENT AND INTACT INFUSING D5NS @75CC/HR.  SAFETY MEASURES IN PLACE 
WITH BED IN LOWEST LOCKED POSITION WITH SIDE RAILS UP X2.  CALL LIGHT WITHIN REACH.  WILL 
CONTINUE TO MONITOR.

## 2020-02-02 VITALS — SYSTOLIC BLOOD PRESSURE: 156 MMHG | DIASTOLIC BLOOD PRESSURE: 74 MMHG

## 2020-02-02 VITALS — DIASTOLIC BLOOD PRESSURE: 82 MMHG | SYSTOLIC BLOOD PRESSURE: 149 MMHG

## 2020-02-02 VITALS — SYSTOLIC BLOOD PRESSURE: 140 MMHG | DIASTOLIC BLOOD PRESSURE: 91 MMHG

## 2020-02-02 LAB
BASOPHILS # BLD AUTO: 0 /CMM (ref 0–0.2)
BASOPHILS NFR BLD AUTO: 0 % (ref 0–2)
BUN SERPL-MCNC: 23 MG/DL (ref 7–18)
CALCIUM SERPL-MCNC: 8.1 MG/DL (ref 8.5–10.1)
CHLORIDE SERPL-SCNC: 108 MMOL/L (ref 98–107)
CO2 SERPL-SCNC: 27 MMOL/L (ref 21–32)
CREAT SERPL-MCNC: 0.9 MG/DL (ref 0.6–1.3)
EOSINOPHIL NFR BLD AUTO: 0 % (ref 0–6)
GLUCOSE SERPL-MCNC: 129 MG/DL (ref 74–106)
HCT VFR BLD AUTO: 28 % (ref 39–51)
HGB BLD-MCNC: 8.9 G/DL (ref 13.5–17.5)
LYMPHOCYTES NFR BLD AUTO: 0.5 /CMM (ref 0.8–4.8)
LYMPHOCYTES NFR BLD AUTO: 4.2 % (ref 20–44)
MCHC RBC AUTO-ENTMCNC: 32 G/DL (ref 31–36)
MCV RBC AUTO: 90 FL (ref 80–96)
MONOCYTES NFR BLD AUTO: 0.3 /CMM (ref 0.1–1.3)
MONOCYTES NFR BLD AUTO: 2.6 % (ref 2–12)
NEUTROPHILS # BLD AUTO: 11.2 /CMM (ref 1.8–8.9)
NEUTROPHILS NFR BLD AUTO: 93.2 % (ref 43–81)
PLATELET # BLD AUTO: 247 /CMM (ref 150–450)
POTASSIUM SERPL-SCNC: 3.8 MMOL/L (ref 3.5–5.1)
RBC # BLD AUTO: 3.11 MIL/UL (ref 4.5–6)
SODIUM SERPL-SCNC: 144 MMOL/L (ref 136–145)
WBC NRBC COR # BLD AUTO: 12 K/UL (ref 4.3–11)

## 2020-02-02 RX ADMIN — VITAMIN D, TAB 1000IU (100/BT) SCH UNIT: 25 TAB at 08:16

## 2020-02-02 RX ADMIN — DOXYCYCLINE HYCLATE SCH MG: 100 TABLET, COATED ORAL at 20:25

## 2020-02-02 RX ADMIN — Medication SCH MG: at 13:35

## 2020-02-02 RX ADMIN — ALBUTEROL SULFATE SCH MG: 2.5 SOLUTION RESPIRATORY (INHALATION) at 13:35

## 2020-02-02 RX ADMIN — MIRTAZAPINE SCH MG: 15 TABLET, FILM COATED ORAL at 20:24

## 2020-02-02 RX ADMIN — ALBUTEROL SULFATE SCH MG: 2.5 SOLUTION RESPIRATORY (INHALATION) at 08:23

## 2020-02-02 RX ADMIN — DEXTROSE AND SODIUM CHLORIDE PRN MLS/HR: 5; 900 INJECTION, SOLUTION INTRAVENOUS at 23:00

## 2020-02-02 RX ADMIN — ESCITALOPRAM OXALATE SCH MG: 10 TABLET, FILM COATED ORAL at 08:16

## 2020-02-02 RX ADMIN — DOXYCYCLINE HYCLATE SCH MG: 100 TABLET, COATED ORAL at 08:16

## 2020-02-02 RX ADMIN — ALBUTEROL SULFATE SCH MG: 2.5 SOLUTION RESPIRATORY (INHALATION) at 19:53

## 2020-02-02 RX ADMIN — GABAPENTIN SCH MG: 300 CAPSULE ORAL at 08:16

## 2020-02-02 RX ADMIN — TRAZODONE HYDROCHLORIDE SCH MG: 50 TABLET ORAL at 21:06

## 2020-02-02 RX ADMIN — DEXTROSE MONOHYDRATE SCH MLS/HR: 50 INJECTION, SOLUTION INTRAVENOUS at 19:06

## 2020-02-02 RX ADMIN — MELOXICAM SCH MG: 7.5 TABLET ORAL at 08:16

## 2020-02-02 RX ADMIN — MONTELUKAST SODIUM SCH MG: 10 TABLET, FILM COATED ORAL at 08:16

## 2020-02-02 RX ADMIN — MEMANTINE HYDROCHLORIDE SCH MG: 5 TABLET ORAL at 08:16

## 2020-02-02 RX ADMIN — PANTOPRAZOLE SODIUM SCH MG: 40 TABLET, DELAYED RELEASE ORAL at 08:16

## 2020-02-02 RX ADMIN — ALBUTEROL SULFATE SCH MG: 2.5 SOLUTION RESPIRATORY (INHALATION) at 01:27

## 2020-02-02 RX ADMIN — DEXTROSE AND SODIUM CHLORIDE PRN MLS/HR: 5; 900 INJECTION, SOLUTION INTRAVENOUS at 23:52

## 2020-02-02 RX ADMIN — FAMOTIDINE SCH MG: 20 TABLET, FILM COATED ORAL at 08:16

## 2020-02-02 RX ADMIN — QUETIAPINE SCH MG: 25 TABLET, FILM COATED ORAL at 21:06

## 2020-02-02 RX ADMIN — Medication SCH MG: at 19:53

## 2020-02-02 RX ADMIN — DEXTROSE AND SODIUM CHLORIDE PRN MLS/HR: 5; 900 INJECTION, SOLUTION INTRAVENOUS at 08:08

## 2020-02-02 RX ADMIN — Medication SCH MG: at 01:26

## 2020-02-02 RX ADMIN — Medication SCH MG: at 08:23

## 2020-02-02 RX ADMIN — FLUTICASONE FUROATE AND VILANTEROL TRIFENATATE SCH EACH: 100; 25 POWDER RESPIRATORY (INHALATION) at 08:17

## 2020-02-02 NOTE — NUR
MS RN OPENING NOTES



Received patient, awake on bed. With O2 but patient refused to use at this time, on RA 
saturating well, no SOB/respiratory distress noted, no s/sx of discomfort noted at this 
time. With IVF infusing well as ordered. Kept on bed clean, dry and comfortable. On fall and 
aspiration precautions. Call light within easy reach. Will continue to monitor accordingly.

## 2020-02-02 NOTE — NUR
RN NOTES

 PATIENT V/S STABLE, REFUSED PAIN , ON O2-2L NC, NO ACUTE RESPIRATORY DISTRESS. ADMINISTERED 
SCHEDULED MEDICATION, ASSIST PATIENT TO THE BATHROOM WITH ASSIST 2 PERSONNEL , INFUSING 1/2 
NS AT 75 ML/HR ON RIGHT FA INTACT, CALL LIGHT WITHIN TO REACH. ENDORSED ONCOMING NURSE 
FOLLOW PLAN OF CARE.

## 2020-02-02 NOTE — NUR
MS RN NOTES



PT FOUND ON FLOOR.  VSS.  PT DENIES PAIN.  PT DENIES HITTING ANYTHING.  MD AWARE WITH NO NEW 
ORDERS.  SUPERVISOR AWARE AS WELL AS FAMILY.  WILL CONTINUE TO MONITOR.

## 2020-02-02 NOTE — NUR
MS RN NOTES



PT IN BED AWAKE AND ABLE TO MAKE NEEDS KNOWN.  PT A/O X2-3.  RESPIRATIONS EVEN AND UNLABORED 
WITH NO S/S OF ACUTE DISTRESS OR SOB NOTED THROUGHOUT SHIFT.  NO COMPLAINTS OF PAIN AT THIS 
TIME.  PT NOTED WITH RHAND @22G PATENT AND INTACT INFUSING D5NS @75CC/HR.  SAFETY MEASURES 
IN PLACE WITH BED IN LOWEST LOCKED POSITION WITH SIDE RAILS UP X2.  CALL LIGHT WITHIN REACH. 
 WILL ENDORSE TO ONCOMING NURSE FOR TREVOR.

## 2020-02-02 NOTE — NUR
rn notes

 patient in the bed resting, seen by hospitalist Eliecer NP, no new orders, assist patient 
to the brp, safety precaution maintained all the time.

## 2020-02-02 NOTE — NUR
RN NOTES

 RECEIVED PATIENT IN THE BED A/O X3 , PATIENT HAS NO ACUTE RESPIRATORY DISTRESS ON O2-2L NC, 
PATIENT REFUSED PAIN, ADMINISTERED SCHEDULED MEDICATION, INFUSING D5 NS AT 75 ML/HR ON RIGHT 
HAND. PATIENT WAS COMPLAINING OF SLEEP PROBLEM DURING NIGHT TIME. USING URINAL, AND USING 
WALKER WHEN GOING BATHROOM ENCOURAGED TO USE CALL LIGHT FOR HELP. CALL LIGHT WITHIN TO 
REACH, BED ALARM ON. V/S WNL, SAFETY PRECAUTION  MAINTAINED ALL THE TIME. PATIENT TOLERATED 
BREAKFAST WELL. CONTINUED MONITORING.

## 2020-02-03 VITALS — SYSTOLIC BLOOD PRESSURE: 148 MMHG | DIASTOLIC BLOOD PRESSURE: 77 MMHG

## 2020-02-03 VITALS — DIASTOLIC BLOOD PRESSURE: 72 MMHG | SYSTOLIC BLOOD PRESSURE: 144 MMHG

## 2020-02-03 VITALS — DIASTOLIC BLOOD PRESSURE: 73 MMHG | SYSTOLIC BLOOD PRESSURE: 139 MMHG

## 2020-02-03 LAB
BASOPHILS # BLD AUTO: 0 /CMM (ref 0–0.2)
BASOPHILS NFR BLD AUTO: 0 % (ref 0–2)
BUN SERPL-MCNC: 23 MG/DL (ref 7–18)
CALCIUM SERPL-MCNC: 8.2 MG/DL (ref 8.5–10.1)
CHLORIDE SERPL-SCNC: 108 MMOL/L (ref 98–107)
CHOLEST SERPL-MCNC: 147 MG/DL (ref ?–200)
CO2 SERPL-SCNC: 28 MMOL/L (ref 21–32)
CREAT SERPL-MCNC: 0.9 MG/DL (ref 0.6–1.3)
EOSINOPHIL NFR BLD AUTO: 0 % (ref 0–6)
FERRITIN SERPL-MCNC: 612 NG/ML (ref 8–388)
GLUCOSE SERPL-MCNC: 155 MG/DL (ref 74–106)
HCT VFR BLD AUTO: 29 % (ref 39–51)
HDLC SERPL-MCNC: 51 MG/DL (ref 40–60)
HGB BLD-MCNC: 9.3 G/DL (ref 13.5–17.5)
IRON SERPL-MCNC: 70 UG/DL (ref 50–175)
LDLC SERPL DIRECT ASSAY-MCNC: 83 MG/DL (ref 0–99)
LYMPHOCYTES NFR BLD AUTO: 0.3 /CMM (ref 0.8–4.8)
LYMPHOCYTES NFR BLD AUTO: 3.2 % (ref 20–44)
MCHC RBC AUTO-ENTMCNC: 32 G/DL (ref 31–36)
MCV RBC AUTO: 91 FL (ref 80–96)
MONOCYTES NFR BLD AUTO: 0.2 /CMM (ref 0.1–1.3)
MONOCYTES NFR BLD AUTO: 2.2 % (ref 2–12)
NEUTROPHILS # BLD AUTO: 9.9 /CMM (ref 1.8–8.9)
NEUTROPHILS NFR BLD AUTO: 94.6 % (ref 43–81)
PLATELET # BLD AUTO: 249 /CMM (ref 150–450)
POTASSIUM SERPL-SCNC: 3.7 MMOL/L (ref 3.5–5.1)
RBC # BLD AUTO: 3.19 MIL/UL (ref 4.5–6)
SODIUM SERPL-SCNC: 142 MMOL/L (ref 136–145)
TIBC SERPL-MCNC: 180 UG/DL (ref 250–450)
TRIGL SERPL-MCNC: 51 MG/DL (ref 30–150)
WBC NRBC COR # BLD AUTO: 10.5 K/UL (ref 4.3–11)

## 2020-02-03 RX ADMIN — VITAMIN D, TAB 1000IU (100/BT) SCH UNIT: 25 TAB at 08:01

## 2020-02-03 RX ADMIN — ESCITALOPRAM OXALATE SCH MG: 10 TABLET, FILM COATED ORAL at 08:01

## 2020-02-03 RX ADMIN — Medication SCH MG: at 01:59

## 2020-02-03 RX ADMIN — MONTELUKAST SODIUM SCH MG: 10 TABLET, FILM COATED ORAL at 08:01

## 2020-02-03 RX ADMIN — ALBUTEROL SULFATE SCH MG: 2.5 SOLUTION RESPIRATORY (INHALATION) at 15:00

## 2020-02-03 RX ADMIN — Medication SCH MG: at 08:36

## 2020-02-03 RX ADMIN — PANTOPRAZOLE SODIUM SCH MG: 40 TABLET, DELAYED RELEASE ORAL at 08:01

## 2020-02-03 RX ADMIN — QUETIAPINE SCH MG: 25 TABLET, FILM COATED ORAL at 21:29

## 2020-02-03 RX ADMIN — MIRTAZAPINE SCH MG: 15 TABLET, FILM COATED ORAL at 20:32

## 2020-02-03 RX ADMIN — DOXYCYCLINE HYCLATE SCH MG: 100 TABLET, COATED ORAL at 21:08

## 2020-02-03 RX ADMIN — DEXTROSE MONOHYDRATE SCH MLS/HR: 50 INJECTION, SOLUTION INTRAVENOUS at 17:12

## 2020-02-03 RX ADMIN — FLUTICASONE FUROATE AND VILANTEROL TRIFENATATE SCH EACH: 100; 25 POWDER RESPIRATORY (INHALATION) at 08:05

## 2020-02-03 RX ADMIN — Medication SCH MG: at 15:11

## 2020-02-03 RX ADMIN — MEMANTINE HYDROCHLORIDE SCH MG: 5 TABLET ORAL at 08:01

## 2020-02-03 RX ADMIN — ALBUTEROL SULFATE SCH MG: 2.5 SOLUTION RESPIRATORY (INHALATION) at 20:15

## 2020-02-03 RX ADMIN — Medication SCH MG: at 20:15

## 2020-02-03 RX ADMIN — DEXTROSE AND SODIUM CHLORIDE PRN MLS/HR: 5; 900 INJECTION, SOLUTION INTRAVENOUS at 17:17

## 2020-02-03 RX ADMIN — ALBUTEROL SULFATE SCH MG: 2.5 SOLUTION RESPIRATORY (INHALATION) at 08:36

## 2020-02-03 RX ADMIN — MELOXICAM SCH MG: 7.5 TABLET ORAL at 08:01

## 2020-02-03 RX ADMIN — ALBUTEROL SULFATE SCH MG: 2.5 SOLUTION RESPIRATORY (INHALATION) at 02:00

## 2020-02-03 RX ADMIN — GABAPENTIN SCH MG: 300 CAPSULE ORAL at 08:01

## 2020-02-03 RX ADMIN — TRAZODONE HYDROCHLORIDE SCH MG: 50 TABLET ORAL at 21:29

## 2020-02-03 RX ADMIN — DOXYCYCLINE HYCLATE SCH MG: 100 TABLET, COATED ORAL at 08:01

## 2020-02-03 RX ADMIN — FAMOTIDINE SCH MG: 20 TABLET, FILM COATED ORAL at 08:01

## 2020-02-03 NOTE — NUR
rn notes

 Seen patient by  hospitalist Dr Malone plan is still keep hospitalization, and 
cardiologist will follow patient too.

## 2020-02-03 NOTE — NUR
RN NOTES

 PATIENT WALKING IN THE HALLWAY WITH PT USING WALKER, NO ACUTE SOB O2-96 ROOM AIR, REFUSED 
PAIN. CONTINUED MONITORING.

## 2020-02-03 NOTE — NUR
RN NOTES

 PATIENT  EATING AT THIS TIME, TOLERATED DINNER WELL, ADMINISTERED SCHEDULED MEDICATION, 
INFUSING ROCEPHIN 100 ML/HR ON RIGHT HAND INTACT, PATIENT REFUSED PAIN , ON O2-2L NC,TURN 
AND REPOSTION SELF IN THE BED. SAFETY PRECAUTION MAINTAINED AL THE TIME. ENDORSED ONCOMING 
NURSE FOLLOW PLAN OF CARE.

## 2020-02-03 NOTE — NUR
RN NOTES



RECEIVED PATIENT AWAKE, NO ACUTE SIGNS OF DISTRESS NOTED, REPOSITIONED FOR COMFORT, SAFETY 
MEASURES IN PLACED, ASPIRATION PRECAUTION EMPHASIZED, IV ACCESS INTACT AND PATENT, NO SIGNS 
OF INFECTION NOTED, CALL LIGHT WITHIN EASY REACH, DENIES ANY PAIN OR DISCOMFORT, ALL NEEDS 
ANTICIPATED, WILL CONTINUE TO MONITOR ACCORDINGLY.

## 2020-02-03 NOTE — NUR
MS RN CLOSING NOTES



Patient asleep on Wolf's position on bed. On O2 vial NC @ 2LPM, no SOB/respiratory 
distress noted at this time. No new complaints made. All nursing needs attended, due meds 
given as ordered. Able to stable at bedside to void, assistance and stand by assist provided 
at all times. Kept on bed clean, dry and comfortable. Call light within easy reach. On fall 
and aspiration precautions. Endorsed.

## 2020-02-03 NOTE — NUR
RN NOTES

 RECEIVED PATIENT  IN THE BED  A/O X3,  NO ACUTE RESPIRATORY DISTRESS, ON O2-NC, V/S STABLE, 
 ADMINISTERED SCHEDULED MEDICATION,  PATIENT REFUSED PAIN, TOLERATED BREAKFAST WELL BY SELF. 
NEEDS ATTENDED  AND ANTICIPATED, CALL LIGHT WITHIN TO REACH, SAFETY PRECAUTION MAINTAINED 
ALL THE TIME.

## 2020-02-04 VITALS — DIASTOLIC BLOOD PRESSURE: 89 MMHG | SYSTOLIC BLOOD PRESSURE: 126 MMHG

## 2020-02-04 VITALS — DIASTOLIC BLOOD PRESSURE: 82 MMHG | SYSTOLIC BLOOD PRESSURE: 154 MMHG

## 2020-02-04 VITALS — SYSTOLIC BLOOD PRESSURE: 129 MMHG | DIASTOLIC BLOOD PRESSURE: 86 MMHG

## 2020-02-04 VITALS — SYSTOLIC BLOOD PRESSURE: 139 MMHG | DIASTOLIC BLOOD PRESSURE: 77 MMHG

## 2020-02-04 LAB
BASOPHILS # BLD AUTO: 0 /CMM (ref 0–0.2)
BASOPHILS NFR BLD AUTO: 0 % (ref 0–2)
BUN SERPL-MCNC: 25 MG/DL (ref 7–18)
CALCIUM SERPL-MCNC: 8 MG/DL (ref 8.5–10.1)
CHLORIDE SERPL-SCNC: 108 MMOL/L (ref 98–107)
CO2 SERPL-SCNC: 32 MMOL/L (ref 21–32)
CREAT SERPL-MCNC: 0.8 MG/DL (ref 0.6–1.3)
EOSINOPHIL NFR BLD AUTO: 0 % (ref 0–6)
GLUCOSE SERPL-MCNC: 106 MG/DL (ref 74–106)
HCT VFR BLD AUTO: 29 % (ref 39–51)
HGB BLD-MCNC: 9 G/DL (ref 13.5–17.5)
LYMPHOCYTES NFR BLD AUTO: 0.8 /CMM (ref 0.8–4.8)
LYMPHOCYTES NFR BLD AUTO: 6.9 % (ref 20–44)
MAGNESIUM SERPL-MCNC: 1.7 MG/DL (ref 1.8–2.4)
MCHC RBC AUTO-ENTMCNC: 32 G/DL (ref 31–36)
MCV RBC AUTO: 91 FL (ref 80–96)
MONOCYTES NFR BLD AUTO: 1.3 /CMM (ref 0.1–1.3)
MONOCYTES NFR BLD AUTO: 10.8 % (ref 2–12)
NEUTROPHILS # BLD AUTO: 9.8 /CMM (ref 1.8–8.9)
NEUTROPHILS NFR BLD AUTO: 82.3 % (ref 43–81)
PLATELET # BLD AUTO: 245 /CMM (ref 150–450)
POTASSIUM SERPL-SCNC: 3.1 MMOL/L (ref 3.5–5.1)
RBC # BLD AUTO: 3.15 MIL/UL (ref 4.5–6)
SODIUM SERPL-SCNC: 146 MMOL/L (ref 136–145)
WBC NRBC COR # BLD AUTO: 11.9 K/UL (ref 4.3–11)

## 2020-02-04 RX ADMIN — GABAPENTIN SCH MG: 300 CAPSULE ORAL at 08:00

## 2020-02-04 RX ADMIN — Medication SCH MG: at 09:07

## 2020-02-04 RX ADMIN — PANTOPRAZOLE SODIUM SCH MG: 40 TABLET, DELAYED RELEASE ORAL at 08:00

## 2020-02-04 RX ADMIN — MONTELUKAST SODIUM SCH MG: 10 TABLET, FILM COATED ORAL at 08:00

## 2020-02-04 RX ADMIN — VITAMIN D, TAB 1000IU (100/BT) SCH UNIT: 25 TAB at 08:00

## 2020-02-04 RX ADMIN — ALBUTEROL SULFATE SCH MG: 2.5 SOLUTION RESPIRATORY (INHALATION) at 09:07

## 2020-02-04 RX ADMIN — Medication SCH MG: at 19:33

## 2020-02-04 RX ADMIN — DOXYCYCLINE HYCLATE SCH MG: 100 TABLET, COATED ORAL at 20:31

## 2020-02-04 RX ADMIN — ALBUTEROL SULFATE SCH MG: 2.5 SOLUTION RESPIRATORY (INHALATION) at 19:33

## 2020-02-04 RX ADMIN — DEXTROSE AND SODIUM CHLORIDE PRN MLS/HR: 5; 900 INJECTION, SOLUTION INTRAVENOUS at 08:19

## 2020-02-04 RX ADMIN — Medication SCH MG: at 01:58

## 2020-02-04 RX ADMIN — MAGNESIUM SULFATE IN DEXTROSE SCH MLS/HR: 10 INJECTION, SOLUTION INTRAVENOUS at 08:26

## 2020-02-04 RX ADMIN — MAGNESIUM SULFATE IN DEXTROSE SCH MLS/HR: 10 INJECTION, SOLUTION INTRAVENOUS at 09:59

## 2020-02-04 RX ADMIN — FLUTICASONE FUROATE AND VILANTEROL TRIFENATATE SCH EACH: 100; 25 POWDER RESPIRATORY (INHALATION) at 08:02

## 2020-02-04 RX ADMIN — DEXTROSE MONOHYDRATE SCH MLS/HR: 50 INJECTION, SOLUTION INTRAVENOUS at 18:00

## 2020-02-04 RX ADMIN — TRAZODONE HYDROCHLORIDE SCH MG: 50 TABLET ORAL at 21:33

## 2020-02-04 RX ADMIN — POTASSIUM CHLORIDE SCH MEQ: 1500 TABLET, EXTENDED RELEASE ORAL at 09:13

## 2020-02-04 RX ADMIN — ESCITALOPRAM OXALATE SCH MG: 10 TABLET, FILM COATED ORAL at 08:00

## 2020-02-04 RX ADMIN — POTASSIUM CHLORIDE SCH MEQ: 1500 TABLET, EXTENDED RELEASE ORAL at 08:26

## 2020-02-04 RX ADMIN — ALBUTEROL SULFATE SCH MG: 2.5 SOLUTION RESPIRATORY (INHALATION) at 01:58

## 2020-02-04 RX ADMIN — MEMANTINE HYDROCHLORIDE SCH MG: 5 TABLET ORAL at 08:00

## 2020-02-04 RX ADMIN — MELOXICAM SCH MG: 7.5 TABLET ORAL at 08:00

## 2020-02-04 RX ADMIN — FAMOTIDINE SCH MG: 20 TABLET, FILM COATED ORAL at 08:01

## 2020-02-04 RX ADMIN — POTASSIUM CHLORIDE SCH MEQ: 1500 TABLET, EXTENDED RELEASE ORAL at 10:00

## 2020-02-04 RX ADMIN — DOXYCYCLINE HYCLATE SCH MG: 100 TABLET, COATED ORAL at 08:00

## 2020-02-04 RX ADMIN — QUETIAPINE SCH MG: 25 TABLET, FILM COATED ORAL at 21:33

## 2020-02-04 RX ADMIN — ALBUTEROL SULFATE SCH MG: 2.5 SOLUTION RESPIRATORY (INHALATION) at 13:59

## 2020-02-04 RX ADMIN — Medication SCH MG: at 13:59

## 2020-02-04 RX ADMIN — MIRTAZAPINE SCH MG: 15 TABLET, FILM COATED ORAL at 20:31

## 2020-02-04 NOTE — NUR
RN NOTES

 RECEIVED PATIENT  IN THE ROOM  ,A/ A/O X3,  NO ACUTE RESPIRATORY DISTRESS, ON O2-2L NC, V/S 
STABLE, ASSIST PATIENT OUT OB BED FOR BREAKFAST SITTING IN THE CHAIR, ADMINISTERED SCHEDULED 
MEDICATION,  REFUSED PAIN, TOLERATED BREAKFAST WELL BY SELF.  INSERTED NEW IV ACCESS ON LEFT 
FA INTACT ,INFUSING MAGNESIUM 100 ML/HR. NEEDS ATTENDED  AND ANTICIPATED, CALL LIGHT WITHIN 
TO REACH, SAFETY PRECAUTION MAINTAINED ALL THE TIME.

## 2020-02-04 NOTE — NUR
RN NOTES

 PATIENT IN THE BED INFUSING ROCEPHIN 100 ML/HR ON LEFT FA INTACT. PATIENT TOLERATED DINNER 
WELL, REFUSED PAIN,  USING URINAL. CALL LIGHT WITHIN TO REACH. ENDORSED ONCOMING NURSE 
FOLLOW PLAN OF CARE.

## 2020-02-04 NOTE — NUR
RN OPEN NOTES



RECEIVED PATIENT AWAKE SITTING IN CHAIR. A/OX3. NO SIGNS OF DISTRESS OR DISCOMFORT. 
BREATHING EVEN AND UNLABORED. ON 2LPM O2 VIA NC. IV ACCESS IN LFA WITH D5NS INFUSING, PATENT 
AND INTACT, NO SIGNS OF REDNESS OR INFILTRATION. BED IN LOW LOCKED POSITION WITH SIDE RAILS 
X2. CALL LIGHT WITHIN REACH. PATIENT ADVISED TO USE CALL LIGHT FOR ASSISTANCE. WILL CONTINUE 
TO MONITOR.

## 2020-02-04 NOTE — NUR
RN NOTES

 PATIENT STABLE, SEEN  PT, AMBULATED IN THE HALLWAY NO SOB NOTES. V/S WNL. ASSIST PATIENT  
BACK TO THE  BED. CALL LIGHT WITHIN TO REACH.

## 2020-02-04 NOTE — NUR
N NOTES

 RECEIVED PATIENT  IN THE BED  A/O X3,  NO ACUTE RESPIRATORY DISTRESS, ON O2-2L NC , V/S 
STABLE,  ADMINISTERED SCHEDULED MEDICATION,  PATIENT REFUSED PAIN, TOLERATED BREAKFAST WELL 
BY SELF. USING URINAL, INFUSING D5NS AT 75 ML/HR ON RIGHT HAND INTACT. NEEDS ATTENDED  AND 
ANTICIPATED, CALL LIGHT WITHIN TO REACH, SAFETY PRECAUTION MAINTAINED ALL THE TIME.

## 2020-02-04 NOTE — NUR
RN NOTES



ALL NEEDS ATTENDED AND MET, PATIENT AWAKE AT THIS TIME, NO ACUTE SIGNS OF DISTRESS NOTED, 
REPOSITIONED FOR COMFORT, SAFETY MEASURES IN PLACED, ASPIRATION PRECAUTION EMPHASIZED, IV 
ACCESS INTACT AND PATENT, NO SIGNS OF INFECTION NOTED, CALL LIGHT WITHIN EASY REACH, DENIES 
ANY PAIN OR DISCOMFORT, ALL NEEDS ANTICIPATED, WILL ENDORSE TO AM NURSE FOR CONTINUITY OF 
CARE.

## 2020-02-05 VITALS — DIASTOLIC BLOOD PRESSURE: 86 MMHG | SYSTOLIC BLOOD PRESSURE: 146 MMHG

## 2020-02-05 VITALS — SYSTOLIC BLOOD PRESSURE: 125 MMHG | DIASTOLIC BLOOD PRESSURE: 74 MMHG

## 2020-02-05 LAB
ALBUMIN SERPL BCP-MCNC: 2.1 G/DL (ref 3.4–5)
ALP SERPL-CCNC: 61 U/L (ref 46–116)
ALT SERPL W P-5'-P-CCNC: 36 U/L (ref 12–78)
AST SERPL W P-5'-P-CCNC: 19 U/L (ref 15–37)
BASOPHILS # BLD AUTO: 0 /CMM (ref 0–0.2)
BASOPHILS NFR BLD AUTO: 0 % (ref 0–2)
BILIRUB SERPL-MCNC: 0.3 MG/DL (ref 0.2–1)
BUN SERPL-MCNC: 23 MG/DL (ref 7–18)
CALCIUM SERPL-MCNC: 7.7 MG/DL (ref 8.5–10.1)
CHLORIDE SERPL-SCNC: 105 MMOL/L (ref 98–107)
CO2 SERPL-SCNC: 32 MMOL/L (ref 21–32)
CREAT SERPL-MCNC: 0.9 MG/DL (ref 0.6–1.3)
EOSINOPHIL NFR BLD AUTO: 0.1 % (ref 0–6)
GLUCOSE SERPL-MCNC: 112 MG/DL (ref 74–106)
HCT VFR BLD AUTO: 30 % (ref 39–51)
HEMOCCULT STL QL: NEGATIVE
HGB BLD-MCNC: 9.6 G/DL (ref 13.5–17.5)
LYMPHOCYTES NFR BLD AUTO: 0.7 /CMM (ref 0.8–4.8)
LYMPHOCYTES NFR BLD AUTO: 6.8 % (ref 20–44)
MAGNESIUM SERPL-MCNC: 1.7 MG/DL (ref 1.8–2.4)
MCHC RBC AUTO-ENTMCNC: 33 G/DL (ref 31–36)
MCV RBC AUTO: 91 FL (ref 80–96)
MONOCYTES NFR BLD AUTO: 1.1 /CMM (ref 0.1–1.3)
MONOCYTES NFR BLD AUTO: 9.8 % (ref 2–12)
NEUTROPHILS # BLD AUTO: 8.9 /CMM (ref 1.8–8.9)
NEUTROPHILS NFR BLD AUTO: 83.3 % (ref 43–81)
PHOSPHATE SERPL-MCNC: 1.8 MG/DL (ref 2.5–4.9)
PLATELET # BLD AUTO: 250 /CMM (ref 150–450)
POTASSIUM SERPL-SCNC: 3.5 MMOL/L (ref 3.5–5.1)
PROT SERPL-MCNC: 5.2 G/DL (ref 6.4–8.2)
RBC # BLD AUTO: 3.25 MIL/UL (ref 4.5–6)
SODIUM SERPL-SCNC: 141 MMOL/L (ref 136–145)
WBC NRBC COR # BLD AUTO: 10.7 K/UL (ref 4.3–11)

## 2020-02-05 RX ADMIN — DOXYCYCLINE HYCLATE SCH MG: 100 TABLET, COATED ORAL at 08:28

## 2020-02-05 RX ADMIN — ALBUTEROL SULFATE SCH MG: 2.5 SOLUTION RESPIRATORY (INHALATION) at 20:12

## 2020-02-05 RX ADMIN — Medication SCH MG: at 08:05

## 2020-02-05 RX ADMIN — ALBUTEROL SULFATE SCH MG: 2.5 SOLUTION RESPIRATORY (INHALATION) at 02:21

## 2020-02-05 RX ADMIN — DEXTROSE AND SODIUM CHLORIDE PRN MLS/HR: 5; 900 INJECTION, SOLUTION INTRAVENOUS at 05:26

## 2020-02-05 RX ADMIN — Medication SCH MG: at 13:29

## 2020-02-05 RX ADMIN — DEXTROSE AND SODIUM CHLORIDE PRN MLS/HR: 5; 900 INJECTION, SOLUTION INTRAVENOUS at 22:37

## 2020-02-05 RX ADMIN — FAMOTIDINE SCH MG: 20 TABLET, FILM COATED ORAL at 08:29

## 2020-02-05 RX ADMIN — ALBUTEROL SULFATE SCH MG: 2.5 SOLUTION RESPIRATORY (INHALATION) at 08:06

## 2020-02-05 RX ADMIN — MONTELUKAST SODIUM SCH MG: 10 TABLET, FILM COATED ORAL at 08:28

## 2020-02-05 RX ADMIN — DEXTROSE MONOHYDRATE SCH MLS/HR: 50 INJECTION, SOLUTION INTRAVENOUS at 18:12

## 2020-02-05 RX ADMIN — VITAMIN D, TAB 1000IU (100/BT) SCH UNIT: 25 TAB at 08:28

## 2020-02-05 RX ADMIN — FLUTICASONE FUROATE AND VILANTEROL TRIFENATATE SCH EACH: 100; 25 POWDER RESPIRATORY (INHALATION) at 08:29

## 2020-02-05 RX ADMIN — Medication SCH MG: at 02:21

## 2020-02-05 RX ADMIN — PANTOPRAZOLE SODIUM SCH MG: 40 TABLET, DELAYED RELEASE ORAL at 08:28

## 2020-02-05 RX ADMIN — DOXYCYCLINE HYCLATE SCH MG: 100 TABLET, COATED ORAL at 21:05

## 2020-02-05 RX ADMIN — Medication SCH MG: at 20:12

## 2020-02-05 RX ADMIN — MEMANTINE HYDROCHLORIDE SCH MG: 5 TABLET ORAL at 08:28

## 2020-02-05 RX ADMIN — ESCITALOPRAM OXALATE SCH MG: 10 TABLET, FILM COATED ORAL at 08:28

## 2020-02-05 RX ADMIN — MAGNESIUM SULFATE IN DEXTROSE SCH MLS/HR: 10 INJECTION, SOLUTION INTRAVENOUS at 17:47

## 2020-02-05 RX ADMIN — GABAPENTIN SCH MG: 300 CAPSULE ORAL at 08:28

## 2020-02-05 RX ADMIN — MAGNESIUM SULFATE IN DEXTROSE SCH MLS/HR: 10 INJECTION, SOLUTION INTRAVENOUS at 16:25

## 2020-02-05 RX ADMIN — MELOXICAM SCH MG: 7.5 TABLET ORAL at 08:28

## 2020-02-05 RX ADMIN — ALBUTEROL SULFATE SCH MG: 2.5 SOLUTION RESPIRATORY (INHALATION) at 13:30

## 2020-02-05 RX ADMIN — MIRTAZAPINE SCH MG: 15 TABLET, FILM COATED ORAL at 20:27

## 2020-02-05 RX ADMIN — QUETIAPINE SCH MG: 25 TABLET, FILM COATED ORAL at 21:05

## 2020-02-05 RX ADMIN — TRAZODONE HYDROCHLORIDE SCH MG: 50 TABLET ORAL at 21:05

## 2020-02-05 NOTE — NUR
MS RN CLOSING NOTES





PT REMAINS IN BED, ASLEEP, EASILY AROUSED, A/O 2-3, [place] SPEAKING. PT TOLERATING RA, 
WITH NO ACUTE RESPIRATORY DISTRESS. PT DENIES ANY PAIN OR DISCOMFORT AT THIS TIME.  IVF D5NS 
AT 75ML/HR TO LFA G20, INTACT AND FLUID INFUSING WELL. PT KEPT COMFORTABLE IN BED. ALL NEEDS 
AND CARE ATTENDED. CALL LIGHT KEPT WITHIN REACH. PT'S BED IN LOWEST, LOCKED POSITION WITH SR 
X3. WILL ENDORSE TO INCOMING NIGHT NURSE FOR TREVOR.

## 2020-02-05 NOTE — NUR
MS RN NOTE



RECEIVED PT IN STABLE CONDITION A/O X3, NO INDICATION OF DISTRESS OR SOB. NO COMPLAINTS OF 
PAIN OR N/V. IV IN LFA #20, IN PLACE WITH IVF INFUSING, TOLERATING WELL. ALL CURRENT NEEDS 
ATTENDED TO. BED LOW, LOCKED, UPPER RAILS UP, AND CALL LIGHT WITHIN REACH. WILL CONT. TO 
MONITOR.

## 2020-02-05 NOTE — NUR
RN CLOSING NOTES



PATIENT RESTING IN BED, EASILY AROUSABLE. A/OX3. NO SIGNS OF DISTRESS OR DISCOMFORT. 
BREATHING EVEN AND UNLABORED. ON 2LPM O2 VIA NC. IV ACCESS IN LFA WITH D5NS INFUSING, PATENT 
AND INTACT, NO SIGNS OF REDNESS OR INFILTRATION. ALL NEEDS MET. NO SIGNIFICANT CHANGES 
THROUGH THE NIGHT. BED IN LOW LOCKED POSITION WITH SIDE RAILS X2. CALL LIGHT WITHIN REACH. 
PATIENT ADVISED TO USE CALL LIGHT FOR ASSISTANCE. WILL ENDORSE TO AM SHIFT FOR TREVOR.

## 2020-02-05 NOTE — NUR
MS RN NOTES



NOTIFIED DR MATUTE REGARDING PT'S MG 1.7L. AWAITING FOR REPLACEMENT. PHOSPHORUS REPLACED 
BY DR. IQBAL. AWAITING FOR RESPONSE.

## 2020-02-05 NOTE — NUR
MS RN OPENING NOTES





RECEIVED PT IN BED, ASLEEP, EASILY AROUSED, A/O 2-3, [place] SPEAKING. PT TOLERATING RA, 
WITH NO ACUTE RESPIRATORY DISTRESS. PT DENIES ANY PAIN OR DISCOMFORT AT THIS TIME. ALSO 
DENIES ANY CONCERN OR QUESTIONS AT THE MOMENT. IVF D5NS AT 75ML/HR TO LFA G20, INTACT AND 
FLUID INFUSING WELL. PT KEPT COMFORTABLE IN BED. CALL LIGHT KEPT WITHIN REACH. PT'S BED IN 
LOWEST, LOCKED POSITION WITH SR X3.

## 2020-02-06 VITALS — DIASTOLIC BLOOD PRESSURE: 76 MMHG | SYSTOLIC BLOOD PRESSURE: 140 MMHG

## 2020-02-06 VITALS — DIASTOLIC BLOOD PRESSURE: 82 MMHG | SYSTOLIC BLOOD PRESSURE: 132 MMHG

## 2020-02-06 VITALS — DIASTOLIC BLOOD PRESSURE: 69 MMHG | SYSTOLIC BLOOD PRESSURE: 130 MMHG

## 2020-02-06 LAB
BUN SERPL-MCNC: 23 MG/DL (ref 7–18)
CALCIUM SERPL-MCNC: 7.7 MG/DL (ref 8.5–10.1)
CHLORIDE SERPL-SCNC: 105 MMOL/L (ref 98–107)
CO2 SERPL-SCNC: 33 MMOL/L (ref 21–32)
CREAT SERPL-MCNC: 0.8 MG/DL (ref 0.6–1.3)
GLUCOSE SERPL-MCNC: 96 MG/DL (ref 74–106)
MAGNESIUM SERPL-MCNC: 2.1 MG/DL (ref 1.8–2.4)
PHOSPHATE SERPL-MCNC: 2.4 MG/DL (ref 2.5–4.9)
POTASSIUM SERPL-SCNC: 3.6 MMOL/L (ref 3.5–5.1)
SODIUM SERPL-SCNC: 142 MMOL/L (ref 136–145)

## 2020-02-06 RX ADMIN — PANTOPRAZOLE SODIUM SCH MG: 40 TABLET, DELAYED RELEASE ORAL at 08:30

## 2020-02-06 RX ADMIN — TRAZODONE HYDROCHLORIDE SCH MG: 50 TABLET ORAL at 22:44

## 2020-02-06 RX ADMIN — FLUTICASONE FUROATE AND VILANTEROL TRIFENATATE SCH EACH: 100; 25 POWDER RESPIRATORY (INHALATION) at 08:36

## 2020-02-06 RX ADMIN — MEMANTINE HYDROCHLORIDE SCH MG: 5 TABLET ORAL at 08:30

## 2020-02-06 RX ADMIN — ESCITALOPRAM OXALATE SCH MG: 10 TABLET, FILM COATED ORAL at 08:29

## 2020-02-06 RX ADMIN — ALBUTEROL SULFATE SCH MG: 2.5 SOLUTION RESPIRATORY (INHALATION) at 00:35

## 2020-02-06 RX ADMIN — DOXYCYCLINE HYCLATE SCH MG: 100 TABLET, COATED ORAL at 08:29

## 2020-02-06 RX ADMIN — QUETIAPINE SCH MG: 25 TABLET, FILM COATED ORAL at 22:44

## 2020-02-06 RX ADMIN — MELOXICAM SCH MG: 7.5 TABLET ORAL at 08:30

## 2020-02-06 RX ADMIN — VITAMIN D, TAB 1000IU (100/BT) SCH UNIT: 25 TAB at 08:29

## 2020-02-06 RX ADMIN — ALBUTEROL SULFATE SCH MG: 2.5 SOLUTION RESPIRATORY (INHALATION) at 07:51

## 2020-02-06 RX ADMIN — Medication SCH MG: at 07:51

## 2020-02-06 RX ADMIN — ALBUTEROL SULFATE SCH MG: 2.5 SOLUTION RESPIRATORY (INHALATION) at 13:44

## 2020-02-06 RX ADMIN — Medication SCH MG: at 13:44

## 2020-02-06 RX ADMIN — GABAPENTIN SCH MG: 300 CAPSULE ORAL at 08:29

## 2020-02-06 RX ADMIN — ALBUTEROL SULFATE SCH MG: 2.5 SOLUTION RESPIRATORY (INHALATION) at 20:15

## 2020-02-06 RX ADMIN — DOXYCYCLINE HYCLATE SCH MG: 100 TABLET, COATED ORAL at 20:07

## 2020-02-06 RX ADMIN — FAMOTIDINE SCH MG: 20 TABLET, FILM COATED ORAL at 08:36

## 2020-02-06 RX ADMIN — DEXTROSE MONOHYDRATE SCH MLS/HR: 50 INJECTION, SOLUTION INTRAVENOUS at 17:31

## 2020-02-06 RX ADMIN — Medication SCH MG: at 00:35

## 2020-02-06 RX ADMIN — MIRTAZAPINE SCH MG: 15 TABLET, FILM COATED ORAL at 20:07

## 2020-02-06 RX ADMIN — Medication SCH MG: at 20:15

## 2020-02-06 RX ADMIN — MONTELUKAST SODIUM SCH MG: 10 TABLET, FILM COATED ORAL at 08:30

## 2020-02-06 NOTE — NUR
MS RN NOTE



PT REMAINS IN STABLE CONDITION A/O X3, NO INDICATION OF DISTRESS OR SOB. NO COMPLAINTS OF 
PAIN OR N/V. IV IN LFA #20, IN PLACE WITH IVF INFUSING, TOLERATING WELL. ALL CURRENT NEEDS 
ATTENDED TO. BED LOW, LOCKED, UPPER RAILS UP, AND CALL LIGHT WITHIN REACH. WILL CONT. TO 
MONITOR AND ENDORSE TO NEXT SHIFT FOR TREVOR.

## 2020-02-06 NOTE — NUR
MS RN OPENING NOTES: RECEIVED PATIENT RESTING IN BED, A/O X4. NOT IN RESPIRATORY DISTRESS. 
ON O2 AT 2L/MIN. INSTRUCTED PATIENT TO CALL WHEN OOB, VERBALIZED UNDERSTANDING. CALL LIGHT 
WITHIN REACH. BED IN LOWEST AND LOCKED POSITION. BED ALARM ON.

## 2020-02-07 VITALS — SYSTOLIC BLOOD PRESSURE: 146 MMHG | DIASTOLIC BLOOD PRESSURE: 73 MMHG

## 2020-02-07 VITALS — DIASTOLIC BLOOD PRESSURE: 79 MMHG | SYSTOLIC BLOOD PRESSURE: 132 MMHG

## 2020-02-07 LAB
BUN SERPL-MCNC: 24 MG/DL (ref 7–18)
CALCIUM SERPL-MCNC: 7.8 MG/DL (ref 8.5–10.1)
CHLORIDE SERPL-SCNC: 104 MMOL/L (ref 98–107)
CO2 SERPL-SCNC: 34 MMOL/L (ref 21–32)
CREAT SERPL-MCNC: 0.8 MG/DL (ref 0.6–1.3)
GLUCOSE SERPL-MCNC: 108 MG/DL (ref 74–106)
PHOSPHATE SERPL-MCNC: 2.7 MG/DL (ref 2.5–4.9)
POTASSIUM SERPL-SCNC: 3.6 MMOL/L (ref 3.5–5.1)
SODIUM SERPL-SCNC: 142 MMOL/L (ref 136–145)

## 2020-02-07 RX ADMIN — GABAPENTIN SCH MG: 300 CAPSULE ORAL at 08:24

## 2020-02-07 RX ADMIN — ALBUTEROL SULFATE SCH MG: 2.5 SOLUTION RESPIRATORY (INHALATION) at 08:10

## 2020-02-07 RX ADMIN — DEXTROSE AND SODIUM CHLORIDE PRN MLS/HR: 5; 900 INJECTION, SOLUTION INTRAVENOUS at 03:26

## 2020-02-07 RX ADMIN — MELOXICAM SCH MG: 7.5 TABLET ORAL at 08:23

## 2020-02-07 RX ADMIN — DEXTROSE MONOHYDRATE SCH MLS/HR: 50 INJECTION, SOLUTION INTRAVENOUS at 17:40

## 2020-02-07 RX ADMIN — VITAMIN D, TAB 1000IU (100/BT) SCH UNIT: 25 TAB at 08:24

## 2020-02-07 RX ADMIN — MEMANTINE HYDROCHLORIDE SCH MG: 5 TABLET ORAL at 08:24

## 2020-02-07 RX ADMIN — PANTOPRAZOLE SODIUM SCH MG: 40 TABLET, DELAYED RELEASE ORAL at 08:23

## 2020-02-07 RX ADMIN — DOXYCYCLINE HYCLATE SCH MG: 100 TABLET, COATED ORAL at 08:24

## 2020-02-07 RX ADMIN — FLUTICASONE FUROATE AND VILANTEROL TRIFENATATE SCH EACH: 100; 25 POWDER RESPIRATORY (INHALATION) at 08:24

## 2020-02-07 RX ADMIN — Medication SCH MG: at 01:05

## 2020-02-07 RX ADMIN — FAMOTIDINE SCH MG: 20 TABLET, FILM COATED ORAL at 08:23

## 2020-02-07 RX ADMIN — Medication SCH MG: at 13:37

## 2020-02-07 RX ADMIN — ALBUTEROL SULFATE SCH MG: 2.5 SOLUTION RESPIRATORY (INHALATION) at 13:38

## 2020-02-07 RX ADMIN — ALBUTEROL SULFATE SCH MG: 2.5 SOLUTION RESPIRATORY (INHALATION) at 01:05

## 2020-02-07 RX ADMIN — MONTELUKAST SODIUM SCH MG: 10 TABLET, FILM COATED ORAL at 08:23

## 2020-02-07 RX ADMIN — ESCITALOPRAM OXALATE SCH MG: 10 TABLET, FILM COATED ORAL at 08:24

## 2020-02-07 RX ADMIN — Medication SCH MG: at 08:10

## 2020-02-07 NOTE — NUR
MS RN OPENING NOTES



RECEIVED PATIENT AWAKE IN BED IN NO ACUTE SIGNS OF DISTRESS. A/O X 3. ABLE TO MAKE NEEDS 
KNOWN, DENIES PAIN OR ANY DISCOMFORTS AT THIS TIME. ON 02 VIA N/C AT 2LPM, TOLERATING WELL 
WITH NO SOB NOTED. IV ACCESS ON LFA G #20 INTACT AND PATENT, IVF OF D5 NS @ 75ML/HR INFUSING 
WELL, NO S/S OF INFILTRATIONS NOTED. SAFETY MEASURES IN PLACE: CALL LIGHT WITHIN REACH. BED 
IN LOWEST AND LOCKED POSITION WITH SR UP X2. WILL CONTINUE TO MONITOR

## 2020-02-07 NOTE — NUR
RN DISCHARGED NOTES



PT DISCHARGED HOME IN MEDICAL STABLE CONDITION. A/O X3 AND SAME ABLE TO MAKE NEEDS KNOWN. 
V/S TAKEN AND RECORDED. SKIN IS INTACT. ALL BELONGINGS ACCOUNTED FOR AND SIGN FORM.  IV 
ACCESS ON LFA REMOVED WITH NO BLEEDING NOTED, DRY DRESSING APPLIED. NAME ARMBAND REMOVED. 
HEALTH TEACHINGS GIVEN TO PT AND  YAEL SOFIA ND BOTH VERBALIZED UNDERSTANDING. HOME 
MEDS RETURNED TO [facility]. PT LEFT UNIT AT 1905 VIA WHEELCHAIR ACCOMPANIED BY CNA AND 
YAEL ALEX . MD AWARE OF DISCHARGE

## 2020-02-07 NOTE — NUR
MS RN CLOSING NOTES: PATIENT IS RESTING IN BED. VITALS STABLE. URINAL AT THE BEDSIDE. CALL 
LIGHT WITHIN REACH. NO COMPLAIN OF PAIN. NO SOB NOTED. WITH O2 AT 2L/MIN. BED ALARM ON. BED 
IN LOWEST AND LOCKED POSITION. WILL BE D/C TODAY, WILL ENDORSE TO THE NEXT SHIFT. RESTED 
THROUGHOUT THE NIGHT.

## 2020-02-24 ENCOUNTER — HOSPITAL ENCOUNTER (INPATIENT)
Dept: HOSPITAL 54 - ER | Age: 85
LOS: 7 days | Discharge: HOME | DRG: 190 | End: 2020-03-02
Attending: NURSE PRACTITIONER | Admitting: LEGAL MEDICINE
Payer: MEDICARE

## 2020-02-24 VITALS — BODY MASS INDEX: 18.96 KG/M2 | HEIGHT: 66 IN | WEIGHT: 118 LBS

## 2020-02-24 DIAGNOSIS — Z99.81: ICD-10-CM

## 2020-02-24 DIAGNOSIS — I11.0: ICD-10-CM

## 2020-02-24 DIAGNOSIS — J18.0: ICD-10-CM

## 2020-02-24 DIAGNOSIS — F03.90: ICD-10-CM

## 2020-02-24 DIAGNOSIS — I50.9: ICD-10-CM

## 2020-02-24 DIAGNOSIS — F41.9: ICD-10-CM

## 2020-02-24 DIAGNOSIS — Z87.01: ICD-10-CM

## 2020-02-24 DIAGNOSIS — Z79.51: ICD-10-CM
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
## 2020-02-24 DIAGNOSIS — J96.01: ICD-10-CM

## 2020-02-24 DIAGNOSIS — Z87.891: ICD-10-CM

## 2020-02-24 DIAGNOSIS — J44.9: ICD-10-CM

## 2020-02-24 DIAGNOSIS — D64.9: ICD-10-CM

## 2020-02-24 DIAGNOSIS — J44.0: ICD-10-CM

## 2020-02-24 DIAGNOSIS — T38.0X5A: ICD-10-CM

## 2020-02-24 DIAGNOSIS — J44.1: Primary | ICD-10-CM

## 2020-02-24 LAB
ALBUMIN SERPL BCP-MCNC: 2.4 G/DL (ref 3.4–5)
ALP SERPL-CCNC: 64 U/L (ref 46–116)
ALT SERPL W P-5'-P-CCNC: 22 U/L (ref 12–78)
AST SERPL W P-5'-P-CCNC: 17 U/L (ref 15–37)
BASE EXCESS BLDA CALC-SCNC: 4.4 MMOL/L
BASOPHILS # BLD AUTO: 0 /CMM (ref 0–0.2)
BASOPHILS NFR BLD AUTO: 0.3 % (ref 0–2)
BILIRUB DIRECT SERPL-MCNC: 0.1 MG/DL (ref 0–0.2)
BILIRUB SERPL-MCNC: 0.1 MG/DL (ref 0.2–1)
BUN SERPL-MCNC: 23 MG/DL (ref 7–18)
CALCIUM SERPL-MCNC: 8.8 MG/DL (ref 8.5–10.1)
CHLORIDE SERPL-SCNC: 107 MMOL/L (ref 98–107)
CO2 SERPL-SCNC: 32 MMOL/L (ref 21–32)
CREAT SERPL-MCNC: 1 MG/DL (ref 0.6–1.3)
DO-HGB MFR BLDA: 48.4 MMHG
EOSINOPHIL NFR BLD AUTO: 1.6 % (ref 0–6)
GLUCOSE SERPL-MCNC: 189 MG/DL (ref 74–106)
HCT VFR BLD AUTO: 27 % (ref 39–51)
HGB BLD-MCNC: 8.7 G/DL (ref 13.5–17.5)
INHALED O2 CONCENTRATION: 28 %
INHALED O2 FLOW RATE: 2 L/MIN (ref 0–30)
LYMPHOCYTES NFR BLD AUTO: 0.8 /CMM (ref 0.8–4.8)
LYMPHOCYTES NFR BLD AUTO: 10.4 % (ref 20–44)
MCHC RBC AUTO-ENTMCNC: 32 G/DL (ref 31–36)
MCV RBC AUTO: 92 FL (ref 80–96)
MONOCYTES NFR BLD AUTO: 1 /CMM (ref 0.1–1.3)
MONOCYTES NFR BLD AUTO: 13.2 % (ref 2–12)
NEUTROPHILS # BLD AUTO: 5.8 /CMM (ref 1.8–8.9)
NEUTROPHILS NFR BLD AUTO: 74.5 % (ref 43–81)
NT-PROBNP SERPL-MCNC: 3097 PG/ML (ref 0–125)
PCO2 TEMP ADJ BLDA: 45.9 MMHG (ref 35–45)
PH TEMP ADJ BLDA: 7.42 [PH] (ref 7.35–7.45)
PLATELET # BLD AUTO: 241 /CMM (ref 150–450)
PO2 TEMP ADJ BLDA: 97.1 MMHG (ref 75–100)
POTASSIUM SERPL-SCNC: 3.7 MMOL/L (ref 3.5–5.1)
PROT SERPL-MCNC: 6.5 G/DL (ref 6.4–8.2)
RBC # BLD AUTO: 2.98 MIL/UL (ref 4.5–6)
SAO2 % BLDA: 96.9 % (ref 92–98.5)
SODIUM SERPL-SCNC: 146 MMOL/L (ref 136–145)
WBC NRBC COR # BLD AUTO: 7.7 K/UL (ref 4.3–11)

## 2020-02-24 PROCEDURE — G0378 HOSPITAL OBSERVATION PER HR: HCPCS

## 2020-02-24 NOTE — NUR
PATIENT CAME TO ER BED 8 C/O MIDSTERNAL CHEST PAIN. PATIENT HAS A HX OF COPD. 
PT'S GRANDSON STATES THAT 1x HR PTA HE HAD A MIDSTERNAL CHEST PAIN, 
NONRADIATING. PT ALSO TOOK 500MG OF TYLENOL 1HR PTA. PT IS [place] SPEAK, 
AAOX4. BREATHING EVENLY AND UNLABORED ON 2L OF N/C. CONNECTED TO CARDIAC 
MONITOR.

## 2020-02-25 VITALS — SYSTOLIC BLOOD PRESSURE: 154 MMHG | DIASTOLIC BLOOD PRESSURE: 80 MMHG

## 2020-02-25 VITALS — SYSTOLIC BLOOD PRESSURE: 136 MMHG | DIASTOLIC BLOOD PRESSURE: 69 MMHG

## 2020-02-25 VITALS — DIASTOLIC BLOOD PRESSURE: 85 MMHG | SYSTOLIC BLOOD PRESSURE: 128 MMHG

## 2020-02-25 VITALS — DIASTOLIC BLOOD PRESSURE: 66 MMHG | SYSTOLIC BLOOD PRESSURE: 133 MMHG

## 2020-02-25 LAB
BASOPHILS # BLD AUTO: 0 /CMM (ref 0–0.2)
BASOPHILS NFR BLD AUTO: 0.3 % (ref 0–2)
BUN SERPL-MCNC: 21 MG/DL (ref 7–18)
CALCIUM SERPL-MCNC: 8.4 MG/DL (ref 8.5–10.1)
CHLORIDE SERPL-SCNC: 106 MMOL/L (ref 98–107)
CO2 SERPL-SCNC: 28 MMOL/L (ref 21–32)
CREAT SERPL-MCNC: 0.7 MG/DL (ref 0.6–1.3)
EOSINOPHIL NFR BLD AUTO: 2.4 % (ref 0–6)
GLUCOSE SERPL-MCNC: 96 MG/DL (ref 74–106)
HCT VFR BLD AUTO: 26 % (ref 39–51)
HGB BLD-MCNC: 8.2 G/DL (ref 13.5–17.5)
LYMPHOCYTES NFR BLD AUTO: 0.9 /CMM (ref 0.8–4.8)
LYMPHOCYTES NFR BLD AUTO: 11.1 % (ref 20–44)
MCHC RBC AUTO-ENTMCNC: 32 G/DL (ref 31–36)
MCV RBC AUTO: 90 FL (ref 80–96)
MONOCYTES NFR BLD AUTO: 0.7 /CMM (ref 0.1–1.3)
MONOCYTES NFR BLD AUTO: 8.1 % (ref 2–12)
NEUTROPHILS # BLD AUTO: 6.4 /CMM (ref 1.8–8.9)
NEUTROPHILS NFR BLD AUTO: 78.1 % (ref 43–81)
PH UR STRIP: 5.5 [PH] (ref 5–8)
PLATELET # BLD AUTO: 221 /CMM (ref 150–450)
POTASSIUM SERPL-SCNC: 3.8 MMOL/L (ref 3.5–5.1)
PROT UR QL STRIP: 100 MG/DL
RBC # BLD AUTO: 2.83 MIL/UL (ref 4.5–6)
RBC #/AREA URNS HPF: (no result) /HPF (ref 0–2)
SODIUM SERPL-SCNC: 142 MMOL/L (ref 136–145)
UROBILINOGEN UR STRIP-MCNC: 0.2 EU/DL
WBC #/AREA URNS HPF: (no result) /HPF (ref 0–3)
WBC NRBC COR # BLD AUTO: 8.1 K/UL (ref 4.3–11)

## 2020-02-25 RX ADMIN — Medication SCH MG: at 19:40

## 2020-02-25 RX ADMIN — ENOXAPARIN SODIUM SCH MG: 40 INJECTION SUBCUTANEOUS at 11:23

## 2020-02-25 RX ADMIN — Medication SCH MG: at 04:09

## 2020-02-25 RX ADMIN — TRAZODONE HYDROCHLORIDE SCH MG: 50 TABLET ORAL at 18:03

## 2020-02-25 RX ADMIN — MIRTAZAPINE SCH MG: 15 TABLET, FILM COATED ORAL at 21:25

## 2020-02-25 RX ADMIN — Medication SCH MG: at 08:07

## 2020-02-25 RX ADMIN — ALBUTEROL SULFATE SCH MG: 1.25 SOLUTION RESPIRATORY (INHALATION) at 19:40

## 2020-02-25 RX ADMIN — ALBUTEROL SULFATE SCH MG: 1.25 SOLUTION RESPIRATORY (INHALATION) at 08:07

## 2020-02-25 RX ADMIN — Medication SCH MG: at 13:49

## 2020-02-25 RX ADMIN — PANTOPRAZOLE SODIUM SCH MG: 40 TABLET, DELAYED RELEASE ORAL at 11:23

## 2020-02-25 RX ADMIN — ALBUTEROL SULFATE SCH MG: 1.25 SOLUTION RESPIRATORY (INHALATION) at 04:09

## 2020-02-25 RX ADMIN — Medication SCH OZ: at 10:30

## 2020-02-25 RX ADMIN — MEMANTINE HYDROCHLORIDE SCH MG: 5 TABLET ORAL at 17:02

## 2020-02-25 RX ADMIN — SODIUM CHLORIDE PRN MLS/HR: 9 INJECTION, SOLUTION INTRAVENOUS at 04:07

## 2020-02-25 RX ADMIN — DEXTROSE MONOHYDRATE SCH MLS/HR: 50 INJECTION, SOLUTION INTRAVENOUS at 04:06

## 2020-02-25 RX ADMIN — ALBUTEROL SULFATE SCH MG: 1.25 SOLUTION RESPIRATORY (INHALATION) at 13:49

## 2020-02-25 NOTE — NUR
RN TELE CLOSING NOTES

 PATIENT IN BED, AWAKE ALERT AND ORIENTED X2 ABLE TO MAKE NEEDS KNOWN AND ANSWER SIMPLE 
QUESTIONS ABLE TO ANSWER QUESTION WITH  RESPIRATIONS NOTED LABORED ON EXERTION, 
HOWEVER AT THIS TIME REMAINS COMFORTABLE, RT TREATMENT DUONEB WAS PROVIDED AND NOTED WITH 
RELIEF, ON 2 L VIA NC TOLERATING WELL SPO2 97%. IV SITE TO RIGHT AC #20G INTACT AND PATENT, 
NO REDNESS, NO INFILTRATION PRESENT,IVF RUNNING AS ORDERED ABX IV GIVEN AS ORDERED, NO 
ADVERSE REACTIONS NOTED,ALL DUE MEDS GIVEN NO ASE. BOTH HEEL REDNESS, SACRAL SCATTERED 
REDNESS, LEFT HIP REDNESS, AND PERINEAL REDNESS AREAS OFFLOADED,  KEPT CLEAN AND DRY, 
REPOSITIONED, CALL LIGHT  KEPT WITHIN REACH, BED ALARM IN PLACE, LOW BED AND LOCKED, MD 
ORDERS FOLLOWED, AT THIS TIME REMAINS CLEAN AND COMFORTABLE. WILL CONTINUE TO MONITOR, AND 
ENDORSE TO NEXT SHIFT ALL NEEDS ATTENDED.  ON CARDIAC MONITOR SR WITH BBB WITH PACS NO 
DISTRESS PRESENT, NO PAIN. NO DISTRESS PRESENT. HR WNL 76

## 2020-02-25 NOTE — NUR
ms rn

received on bed, awake,alert,oriented x2,not in any form of distress, respirations even and 
unlabored,no sob noted, lungs are diminished,abdomen soft,positive bowel sounds,denies pain 
at this time,all needs attended.

## 2020-02-25 NOTE — NUR
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 
PRESENT ON ADMISSION. RECOMMENDATIONS MADE FOR SKIN PROTECTION AND SKIN CARE. DISCUSSED WITH 
NURSING STAFF. WILL SEE CATIA CANTOR IN AGREEMENT WITH PLAN OF CARE. CURRENT PERLA SCORE IS 13. 

-------------------------------------------------------------------------------

Addendum: 02/25/20 at 1011 by SHEKHAR MALDONADO WNDNU

-------------------------------------------------------------------------------

Amended: Links added.

## 2020-02-25 NOTE — NUR
RN TELE ADMISSION NOTES

 RECEIVED PATIENT FROM ER VIA GURNEY SAFELY TRANSFERRED TO BED, AWAKE ALERT AND ORIENTED X2 
ABLE TO MAKE NEEDS KNOWN AND ANSWER SIMPLE QUESTIONS, RESPIRATIONS NOTED LABORED ON 
EXERTION, ON 2 L VIA NC TOLERATING WELL SPO2 97%. IV SITE TO RIGHT AC #20G INTACT AND 
PATENT, NO REDNESS, NO INFILTRATION PRESENT, BODY ASSESSMENT DONE, BOTH HEEL REDNESS, SACRAL 
SCATTERED REDNESS, LEFT HIP REDNESS, AND PERINEAL REDNESS NOTED, CLEANSED AND PROVIDED SKIN 
BARRIER CREAM OFFLOADED, REPOSITIONED, BELONGINGS LIST DONE, ORIENTED TO STAFF AND CALL 
LIGHT AND KEPT WITHIN REACH, BED ALARM IN PLACE, LOW BED AND LOCKED, ORDERS RECEIVED FROM ER 
FROM  NOTED AND CARRIED. WILL FOLLOW AS PER MD ORDERS, AT THIS TIME REMAINS CLEAN 
AND COMFORTABLE. WILL CONTINUE TO MONITOR, ADMITTING DX COPD EXACERBATION. TELE . ON CARDIAC 
MONITOR SR WITH BBB. NO DISTRESS PRESENT. HR WNL 82

## 2020-02-25 NOTE — NUR
MS RN NOTE



RECEIVED PT IN STABLE CONDITION A/O X3, NOTED IN BED. NO SIGNS OF SOB OR DISTRESS, NO C/O 
PAIN OR N/V. IV IN RAC #20 IN PLACE. ALL CURRENT NEEDS ATTENDED TO. BED LOW, LOCKED, UPPER 
RAILS UP, AND CALL LIGHT WITHIN REACH. WILL CONT. TO MONITOR.

## 2020-02-26 VITALS — DIASTOLIC BLOOD PRESSURE: 94 MMHG | SYSTOLIC BLOOD PRESSURE: 152 MMHG

## 2020-02-26 VITALS — SYSTOLIC BLOOD PRESSURE: 128 MMHG | DIASTOLIC BLOOD PRESSURE: 64 MMHG

## 2020-02-26 VITALS — DIASTOLIC BLOOD PRESSURE: 70 MMHG | SYSTOLIC BLOOD PRESSURE: 142 MMHG

## 2020-02-26 RX ADMIN — Medication SCH OZ: at 10:19

## 2020-02-26 RX ADMIN — ENOXAPARIN SODIUM SCH MG: 40 INJECTION SUBCUTANEOUS at 09:04

## 2020-02-26 RX ADMIN — Medication SCH MG: at 12:42

## 2020-02-26 RX ADMIN — MELOXICAM SCH MG: 7.5 TABLET ORAL at 09:03

## 2020-02-26 RX ADMIN — GABAPENTIN SCH MG: 300 CAPSULE ORAL at 09:01

## 2020-02-26 RX ADMIN — PANTOPRAZOLE SODIUM SCH MG: 40 TABLET, DELAYED RELEASE ORAL at 09:01

## 2020-02-26 RX ADMIN — MIRTAZAPINE SCH MG: 15 TABLET, FILM COATED ORAL at 21:04

## 2020-02-26 RX ADMIN — ALBUTEROL SULFATE SCH MG: 1.25 SOLUTION RESPIRATORY (INHALATION) at 01:56

## 2020-02-26 RX ADMIN — MONTELUKAST SODIUM SCH MG: 10 TABLET, FILM COATED ORAL at 09:01

## 2020-02-26 RX ADMIN — ALBUTEROL SULFATE SCH MG: 1.25 SOLUTION RESPIRATORY (INHALATION) at 12:43

## 2020-02-26 RX ADMIN — SODIUM CHLORIDE PRN MLS/HR: 9 INJECTION, SOLUTION INTRAVENOUS at 03:38

## 2020-02-26 RX ADMIN — Medication SCH MG: at 08:02

## 2020-02-26 RX ADMIN — ALBUTEROL SULFATE SCH MG: 1.25 SOLUTION RESPIRATORY (INHALATION) at 08:02

## 2020-02-26 RX ADMIN — MEMANTINE HYDROCHLORIDE SCH MG: 5 TABLET ORAL at 09:01

## 2020-02-26 RX ADMIN — MEMANTINE HYDROCHLORIDE SCH MG: 5 TABLET ORAL at 18:32

## 2020-02-26 RX ADMIN — Medication SCH MG: at 19:49

## 2020-02-26 RX ADMIN — TRAZODONE HYDROCHLORIDE SCH MG: 50 TABLET ORAL at 18:32

## 2020-02-26 RX ADMIN — DEXTROSE MONOHYDRATE SCH MLS/HR: 50 INJECTION, SOLUTION INTRAVENOUS at 03:38

## 2020-02-26 RX ADMIN — ESCITALOPRAM OXALATE SCH MG: 10 TABLET, FILM COATED ORAL at 09:01

## 2020-02-26 RX ADMIN — ALBUTEROL SULFATE SCH MG: 1.25 SOLUTION RESPIRATORY (INHALATION) at 19:49

## 2020-02-26 NOTE — NUR
MS RN NOTE



PT REMAINS IN STABLE CONDITION A/O X3, NOTED IN BED. NO SIGNS OF SOB OR DISTRESS, NO C/O 
PAIN OR N/V. IV IN RAC #20 IN PLACE WITH IVF INFUSING. ALL CURRENT NEEDS ATTENDED TO. BED 
LOW, LOCKED, UPPER RAILS UP, AND CALL LIGHT WITHIN REACH. WILL CONT. TO MONITOR AND ENDORSE 
TO NEXT SHIFT FOR TREVOR.

## 2020-02-26 NOTE — NUR
ms rn

[id number] on bed, awake,alert,oriented x2,not in any form of distress, respirations even and 
unlabored,no sob noted, lungs are clear,abdomen soft,[positive bowel sounds ,denies pain at 
this time,all needs attended.

## 2020-02-27 VITALS — SYSTOLIC BLOOD PRESSURE: 154 MMHG | DIASTOLIC BLOOD PRESSURE: 86 MMHG

## 2020-02-27 VITALS — SYSTOLIC BLOOD PRESSURE: 152 MMHG | DIASTOLIC BLOOD PRESSURE: 84 MMHG

## 2020-02-27 VITALS — DIASTOLIC BLOOD PRESSURE: 83 MMHG | SYSTOLIC BLOOD PRESSURE: 137 MMHG

## 2020-02-27 LAB
BASOPHILS # BLD AUTO: 0 /CMM (ref 0–0.2)
BASOPHILS NFR BLD AUTO: 0 % (ref 0–2)
BUN SERPL-MCNC: 33 MG/DL (ref 7–18)
CALCIUM SERPL-MCNC: 8.5 MG/DL (ref 8.5–10.1)
CHLORIDE SERPL-SCNC: 108 MMOL/L (ref 98–107)
CO2 SERPL-SCNC: 30 MMOL/L (ref 21–32)
CREAT SERPL-MCNC: 1 MG/DL (ref 0.6–1.3)
EOSINOPHIL NFR BLD AUTO: 0 % (ref 0–6)
GLUCOSE SERPL-MCNC: 143 MG/DL (ref 74–106)
HCT VFR BLD AUTO: 29 % (ref 39–51)
HGB BLD-MCNC: 9.2 G/DL (ref 13.5–17.5)
LYMPHOCYTES NFR BLD AUTO: 0.7 /CMM (ref 0.8–4.8)
LYMPHOCYTES NFR BLD AUTO: 6.2 % (ref 20–44)
MAGNESIUM SERPL-MCNC: 1.9 MG/DL (ref 1.8–2.4)
MCHC RBC AUTO-ENTMCNC: 32 G/DL (ref 31–36)
MCV RBC AUTO: 90 FL (ref 80–96)
MONOCYTES NFR BLD AUTO: 0.7 /CMM (ref 0.1–1.3)
MONOCYTES NFR BLD AUTO: 6.4 % (ref 2–12)
NEUTROPHILS # BLD AUTO: 10 /CMM (ref 1.8–8.9)
NEUTROPHILS NFR BLD AUTO: 87.4 % (ref 43–81)
PLATELET # BLD AUTO: 304 /CMM (ref 150–450)
POTASSIUM SERPL-SCNC: 4 MMOL/L (ref 3.5–5.1)
RBC # BLD AUTO: 3.19 MIL/UL (ref 4.5–6)
SODIUM SERPL-SCNC: 146 MMOL/L (ref 136–145)
WBC NRBC COR # BLD AUTO: 11.4 K/UL (ref 4.3–11)

## 2020-02-27 RX ADMIN — PANTOPRAZOLE SODIUM SCH MG: 40 TABLET, DELAYED RELEASE ORAL at 09:10

## 2020-02-27 RX ADMIN — Medication SCH MG: at 13:46

## 2020-02-27 RX ADMIN — DEXTROSE MONOHYDRATE SCH MLS/HR: 50 INJECTION, SOLUTION INTRAVENOUS at 03:08

## 2020-02-27 RX ADMIN — ALBUTEROL SULFATE SCH MG: 1.25 SOLUTION RESPIRATORY (INHALATION) at 19:29

## 2020-02-27 RX ADMIN — ENOXAPARIN SODIUM SCH MG: 40 INJECTION SUBCUTANEOUS at 09:09

## 2020-02-27 RX ADMIN — Medication SCH MG: at 19:29

## 2020-02-27 RX ADMIN — GABAPENTIN SCH MG: 300 CAPSULE ORAL at 09:08

## 2020-02-27 RX ADMIN — MONTELUKAST SODIUM SCH MG: 10 TABLET, FILM COATED ORAL at 09:08

## 2020-02-27 RX ADMIN — Medication SCH MG: at 07:35

## 2020-02-27 RX ADMIN — MELOXICAM SCH MG: 7.5 TABLET ORAL at 09:08

## 2020-02-27 RX ADMIN — Medication SCH OZ: at 09:09

## 2020-02-27 RX ADMIN — ALBUTEROL SULFATE SCH MG: 1.25 SOLUTION RESPIRATORY (INHALATION) at 13:46

## 2020-02-27 RX ADMIN — MEMANTINE HYDROCHLORIDE SCH MG: 5 TABLET ORAL at 17:01

## 2020-02-27 RX ADMIN — MEMANTINE HYDROCHLORIDE SCH MG: 5 TABLET ORAL at 09:08

## 2020-02-27 RX ADMIN — ESCITALOPRAM OXALATE SCH MG: 10 TABLET, FILM COATED ORAL at 09:08

## 2020-02-27 RX ADMIN — ALBUTEROL SULFATE SCH MG: 1.25 SOLUTION RESPIRATORY (INHALATION) at 07:35

## 2020-02-27 RX ADMIN — MIRTAZAPINE SCH MG: 15 TABLET, FILM COATED ORAL at 21:02

## 2020-02-27 RX ADMIN — TRAZODONE HYDROCHLORIDE SCH MG: 50 TABLET ORAL at 17:01

## 2020-02-27 RX ADMIN — ALBUTEROL SULFATE SCH MG: 1.25 SOLUTION RESPIRATORY (INHALATION) at 00:42

## 2020-02-27 NOTE — NUR
MS RN CLOSING NOTES



PT AWAKE IN BED WATCHING TV AT THIS TIME. HOB ELEVATED. A/O X3. VERBALLY RESPONSIVE, 
PLEASANT AND CALMED DURING THE DAY.  ON SUPPLEMENTAL 02 VIA N/C @ 2LPM, TOLERATING WELL WITH 
NO ACUTE RESPIRATORY DISTRESS NOTED DURING SHIFT.  IV SL ON RAC #20 INTACT PATENT AND 
FLUSHES WELL. SAFETY MEASURES IN PLACE: BED LOW, LOCKED, UPPER RAILS UP AND CALL LIGHT 
WITHIN REACH. ALL NEEDS AND CARE ATTENDED WELL.WILL ENDORSE TO NIGHT SHIFT NURSE FOR TREVOR.

## 2020-02-27 NOTE — NUR
MS RN OPENING NOTES



RECEIVED PT AWAKE IN BED IN NO ACUTE SIGNS OF DISTRESS. A/O X. VERBALLY RESPONSIVE, DENIES 
PAIN OR ANY DISCOMFORTS AT THIS TIME. ON 02 VIA N/C @ 2LPM, TOLERATING WELL WITH NO SOB 
NOTED. IV SL ON RAC #20 INTACT PATENT AND FLUSHES WELL. SAFETY MEASURES IN PLACE: BED LOW, 
LOCKED, UPPER RAILS UP AND CALL LIGHT WITHIN REACH. WILL CONTINUE TO MONITOR PT ACCORDINGLY

## 2020-02-27 NOTE — NUR
MS RN NOTE



PT REMAINS IN STABLE CONDITION A/O X3, NOTED RESTING IN BED. NO SIGNS OF SOB OR DISTRESS, NO 
C/O PAIN OR N/V. IV IN RAC #20 IN PLACE. ALL CURRENT NEEDS ATTENDED TO. BED LOW, LOCKED, 
UPPER RAILS UP, AND CALL LIGHT WITHIN REACH. WILL CONT. TO MONITOR AND ENDORSE TO NEXT SHIFT 
FOR TREVOR.

## 2020-02-27 NOTE — NUR
MS RN OPENING NOTES

PATIENT AWAKE AND RESTING COMFORTABLY IN BED. A/OX3. ON 2L NC. PATIENT DENIES SOB OR PAIN AT 
THIS TIME. IV PRESENT ON RIGHT AC, SIZE 20, INTACT & PATENT, HEP LOCKED. SAFETY MEASURES IN 
PLACE. BED LOCKED, SEMI-WAYNE'S POSITION, ALARM ON, SIDE RAILS X2, CALL LIGHT WITHIN REACH. 
WILL CONTINUE TO MONITOR.

## 2020-02-28 VITALS — SYSTOLIC BLOOD PRESSURE: 148 MMHG | DIASTOLIC BLOOD PRESSURE: 74 MMHG

## 2020-02-28 VITALS — SYSTOLIC BLOOD PRESSURE: 148 MMHG | DIASTOLIC BLOOD PRESSURE: 92 MMHG

## 2020-02-28 VITALS — SYSTOLIC BLOOD PRESSURE: 132 MMHG | DIASTOLIC BLOOD PRESSURE: 96 MMHG

## 2020-02-28 LAB
BASOPHILS # BLD AUTO: 0 /CMM (ref 0–0.2)
BASOPHILS NFR BLD AUTO: 0.1 % (ref 0–2)
BUN SERPL-MCNC: 37 MG/DL (ref 7–18)
CALCIUM SERPL-MCNC: 8.6 MG/DL (ref 8.5–10.1)
CHLORIDE SERPL-SCNC: 106 MMOL/L (ref 98–107)
CO2 SERPL-SCNC: 33 MMOL/L (ref 21–32)
CREAT SERPL-MCNC: 0.8 MG/DL (ref 0.6–1.3)
EOSINOPHIL NFR BLD AUTO: 0 % (ref 0–6)
GLUCOSE SERPL-MCNC: 128 MG/DL (ref 74–106)
HCT VFR BLD AUTO: 29 % (ref 39–51)
HGB BLD-MCNC: 9.2 G/DL (ref 13.5–17.5)
LYMPHOCYTES NFR BLD AUTO: 1.1 /CMM (ref 0.8–4.8)
LYMPHOCYTES NFR BLD AUTO: 10 % (ref 20–44)
LYMPHOCYTES NFR BLD MANUAL: 10 % (ref 16–48)
MCHC RBC AUTO-ENTMCNC: 32 G/DL (ref 31–36)
MCV RBC AUTO: 90 FL (ref 80–96)
MONOCYTES NFR BLD AUTO: 0.9 /CMM (ref 0.1–1.3)
MONOCYTES NFR BLD AUTO: 8.1 % (ref 2–12)
MONOCYTES NFR BLD MANUAL: 5 % (ref 0–11)
NEUTROPHILS # BLD AUTO: 9.1 /CMM (ref 1.8–8.9)
NEUTROPHILS NFR BLD AUTO: 81.8 % (ref 43–81)
NEUTS SEG NFR BLD MANUAL: 85 % (ref 42–76)
PLATELET # BLD AUTO: 319 /CMM (ref 150–450)
POTASSIUM SERPL-SCNC: 4 MMOL/L (ref 3.5–5.1)
RBC # BLD AUTO: 3.2 MIL/UL (ref 4.5–6)
SODIUM SERPL-SCNC: 143 MMOL/L (ref 136–145)
WBC NRBC COR # BLD AUTO: 11.1 K/UL (ref 4.3–11)

## 2020-02-28 RX ADMIN — Medication SCH MG: at 07:45

## 2020-02-28 RX ADMIN — ALBUTEROL SULFATE SCH MG: 1.25 SOLUTION RESPIRATORY (INHALATION) at 13:29

## 2020-02-28 RX ADMIN — MIRTAZAPINE SCH MG: 15 TABLET, FILM COATED ORAL at 21:07

## 2020-02-28 RX ADMIN — ALBUTEROL SULFATE SCH MG: 1.25 SOLUTION RESPIRATORY (INHALATION) at 19:01

## 2020-02-28 RX ADMIN — MEMANTINE HYDROCHLORIDE SCH MG: 5 TABLET ORAL at 17:20

## 2020-02-28 RX ADMIN — MONTELUKAST SODIUM SCH MG: 10 TABLET, FILM COATED ORAL at 08:25

## 2020-02-28 RX ADMIN — Medication SCH MG: at 19:01

## 2020-02-28 RX ADMIN — ALBUTEROL SULFATE SCH MG: 1.25 SOLUTION RESPIRATORY (INHALATION) at 00:55

## 2020-02-28 RX ADMIN — MELOXICAM SCH MG: 7.5 TABLET ORAL at 08:27

## 2020-02-28 RX ADMIN — Medication SCH OZ: at 08:28

## 2020-02-28 RX ADMIN — Medication SCH MG: at 13:29

## 2020-02-28 RX ADMIN — ALBUTEROL SULFATE SCH MG: 1.25 SOLUTION RESPIRATORY (INHALATION) at 07:45

## 2020-02-28 RX ADMIN — DEXTROSE MONOHYDRATE SCH MLS/HR: 50 INJECTION, SOLUTION INTRAVENOUS at 03:39

## 2020-02-28 RX ADMIN — ENOXAPARIN SODIUM SCH MG: 40 INJECTION SUBCUTANEOUS at 08:27

## 2020-02-28 RX ADMIN — TRAZODONE HYDROCHLORIDE SCH MG: 50 TABLET ORAL at 17:20

## 2020-02-28 RX ADMIN — PANTOPRAZOLE SODIUM SCH MG: 40 TABLET, DELAYED RELEASE ORAL at 08:24

## 2020-02-28 RX ADMIN — MEMANTINE HYDROCHLORIDE SCH MG: 5 TABLET ORAL at 08:25

## 2020-02-28 RX ADMIN — ESCITALOPRAM OXALATE SCH MG: 10 TABLET, FILM COATED ORAL at 08:24

## 2020-02-28 RX ADMIN — GABAPENTIN SCH MG: 300 CAPSULE ORAL at 08:24

## 2020-02-28 NOTE — NUR
MS RN NOTES

PATIENT IN BED ALERT ORIENTED X 2-3. NO ACUTE DISTRESS NOTED, BREATHING UNLABORED. NO SOB 
NOTED. IV ACCESS PATENT AND INTACT, NO REDNESS OR SWELLING NOTED.NEEDS ATTENDED AND 
ANTICIPATED. SAFETY MEASURES IN PLACE. CALL LIGHT WITHIN REACH. WILL ENDORSE TO NIGHT NURSE 
FOR CONTINUITY OF CARE.

## 2020-02-28 NOTE — NUR
MS RN NOTES

CALLED FAMILY MARII CASTRO TO GET CONSENT FOR CT PULMONARY ANGIOGRAM, LEFT A MESSAGE, 
AWAITING FOR CALL BACK

## 2020-02-28 NOTE — NUR
MS RN NOTES

PATIENT IN BED ALERT ORIENTED X 2-3. NO ACUTE DISTRESS NOTED, BREATHING UNLABORED. NO SOB 
NOTED. IV ACCESS PATENT AND INTACT, NO REDNESS OR SWELLING NOTED. SAFETY MEASURES IN PLACE. 
CALL LIGHT WITHIN REACH. WILL CONTINUE TO MONITOR ACCORDINGLY.

## 2020-02-28 NOTE — NUR
MS RN OPENING NOTES

PATIENT AWAKE IN BED. A/OX2-3. ON 1L NC. NO S/S OF ACUTE RESPIRATORY DISTRESS OR C/O PAIN AT 
THIS TIME. IV PRESENT ON RIGHT AC, SIZE 20, INTACT & PATENT, HEP LOCKED. SAFETY MEASURES IN 
PLACE. BED LOCKED, ALARM ON, SIDE RAILS X2, CALL LIGHT WITHIN REACH. WILL CONTINUE TO 
MONITOR.

## 2020-02-28 NOTE — NUR
MS RN NOTES

CALLED FAMILY AGAIN MARII CASTRO TO GET CONSENT FOR CT PULMONARY ANGIOGRAM, LEFT A MESSAGE, 
AWAITING FOR CALL BACK

## 2020-02-28 NOTE — NUR
MS ELLIOTT NOTES

PATIENT IN BED ALERT ORIENTED X 3. NO ACUTE DISTRESS NOTED, BREATHING UNLABORED. NO SOB 
NOTED. IV ACCESS PATENT AND INTACT, NO REDNESS OR SWELLING NOTED. SAFETY MEASURES IN PLACE. 
CALL LIGHT WITHIN REACH. WILL CONTINUE TO MONITOR ACCORDINGLY.

-------------------------------------------------------------------------------

Addendum: 02/28/20 at 1604 by DANG STANTON RN

-------------------------------------------------------------------------------

DISREGARD ABOVE NOTES WRONG PATIENT

## 2020-02-28 NOTE — NUR
MS RN CLOSING NOTES

PATIENT AWAKEN IN BED. A/O X2-3. ON 2L NC. NO S/S OF RESPIRATORY DISTRESS AND NO C/O PAIN AT 
THIS TIME. IV PRESENT ON RIGHT AC, SIZE 20, INTACT & PATENT, HEP LOCKED. SAFETY MEASURES IN 
PLACE. BED LOCKED, ALARM ON, SIDE RAILS X2, CALL LIGHT WITHIN REACH. WILL ENDORSE TO DAY 
SHIFT NURSE TO FOLLOW PLAN OF CARE.

## 2020-02-29 VITALS — DIASTOLIC BLOOD PRESSURE: 75 MMHG | SYSTOLIC BLOOD PRESSURE: 143 MMHG

## 2020-02-29 VITALS — SYSTOLIC BLOOD PRESSURE: 143 MMHG | DIASTOLIC BLOOD PRESSURE: 80 MMHG

## 2020-02-29 VITALS — DIASTOLIC BLOOD PRESSURE: 60 MMHG | SYSTOLIC BLOOD PRESSURE: 147 MMHG

## 2020-02-29 VITALS — SYSTOLIC BLOOD PRESSURE: 123 MMHG | DIASTOLIC BLOOD PRESSURE: 60 MMHG

## 2020-02-29 RX ADMIN — ALBUTEROL SULFATE SCH MG: 1.25 SOLUTION RESPIRATORY (INHALATION) at 18:52

## 2020-02-29 RX ADMIN — ALBUTEROL SULFATE SCH MG: 1.25 SOLUTION RESPIRATORY (INHALATION) at 00:39

## 2020-02-29 RX ADMIN — ACETYLCYSTEINE SCH MG: 100 INHALANT RESPIRATORY (INHALATION) at 13:39

## 2020-02-29 RX ADMIN — PANTOPRAZOLE SODIUM SCH MG: 40 TABLET, DELAYED RELEASE ORAL at 08:30

## 2020-02-29 RX ADMIN — GABAPENTIN SCH MG: 300 CAPSULE ORAL at 08:30

## 2020-02-29 RX ADMIN — ACETYLCYSTEINE SCH MG: 100 INHALANT RESPIRATORY (INHALATION) at 22:59

## 2020-02-29 RX ADMIN — Medication SCH OZ: at 08:32

## 2020-02-29 RX ADMIN — TRAZODONE HYDROCHLORIDE SCH MG: 50 TABLET ORAL at 17:05

## 2020-02-29 RX ADMIN — MEMANTINE HYDROCHLORIDE SCH MG: 5 TABLET ORAL at 17:05

## 2020-02-29 RX ADMIN — Medication SCH MG: at 07:33

## 2020-02-29 RX ADMIN — DEXTROSE MONOHYDRATE SCH MLS/HR: 50 INJECTION, SOLUTION INTRAVENOUS at 03:11

## 2020-02-29 RX ADMIN — ALBUTEROL SULFATE SCH MG: 1.25 SOLUTION RESPIRATORY (INHALATION) at 13:39

## 2020-02-29 RX ADMIN — MELOXICAM SCH MG: 7.5 TABLET ORAL at 08:30

## 2020-02-29 RX ADMIN — Medication SCH MG: at 13:39

## 2020-02-29 RX ADMIN — MEMANTINE HYDROCHLORIDE SCH MG: 5 TABLET ORAL at 08:30

## 2020-02-29 RX ADMIN — MIRTAZAPINE SCH MG: 15 TABLET, FILM COATED ORAL at 21:10

## 2020-02-29 RX ADMIN — ALBUTEROL SULFATE SCH MG: 1.25 SOLUTION RESPIRATORY (INHALATION) at 07:33

## 2020-02-29 RX ADMIN — ESCITALOPRAM OXALATE SCH MG: 10 TABLET, FILM COATED ORAL at 08:30

## 2020-02-29 RX ADMIN — ENOXAPARIN SODIUM SCH MG: 40 INJECTION SUBCUTANEOUS at 08:31

## 2020-02-29 RX ADMIN — MONTELUKAST SODIUM SCH MG: 10 TABLET, FILM COATED ORAL at 08:30

## 2020-02-29 RX ADMIN — ACETYLCYSTEINE SCH MG: 100 INHALANT RESPIRATORY (INHALATION) at 15:30

## 2020-02-29 RX ADMIN — Medication SCH MG: at 18:52

## 2020-02-29 NOTE — NUR
MS/RN Opening note



Patient received AO x 2-3, able to responds all stimuli. Pt. does no c/o pain or discomfort, 
skin is warm to touch, clean/dry, intact IV site. Respiratory even and unlabored, no s/s of 
respiratory distress observed. Kept lower position of bed with elevated HOB. Call light 
within reach, will continue to monitor.

## 2020-02-29 NOTE — NUR
MS RN NOTES: RECEIVED PATIENT

Patient in bed, awake, A/O 3. 97% on Oxygen at 1LPM, tolerating well, denies shortness of 
breath on exertion. Fall precaution maintained.

## 2020-02-29 NOTE — NUR
MS/RN Closing note



Patient in bed comfortably, no acute distress observed. Respiratory even and unlabored with 
oxygen at 1LPM. Skin is warm to touch, keep clean/dry, provided skin care, intact IV site. 
Kept lower position of be with elevated HOB, call light within reach, all needs met. Will 
endorse night shift.

## 2020-02-29 NOTE — NUR
MS RN CLOSING NOTES

PATIENT SLEEPING IN BED. A/OX2-3. ON 1L NC. NO S/S OF ACUTE RESPIRATORY DISTRESS OR C/O PAIN 
AT THIS TIME. IV PRESENT ON RIGHT AC, SIZE 20, INTACT & PATENT, HEP LOCKED. SAFETY MEASURES 
IN PLACE. BED LOCKED, ALARM ON, SIDE RAILS X2, CALL LIGHT WITHIN REACH. WILL ENDORSE TO DAY 
SHIFT NURSE TO FOLLOW PLAN OF CARE.

## 2020-03-01 VITALS — SYSTOLIC BLOOD PRESSURE: 148 MMHG | DIASTOLIC BLOOD PRESSURE: 91 MMHG

## 2020-03-01 VITALS — DIASTOLIC BLOOD PRESSURE: 84 MMHG | SYSTOLIC BLOOD PRESSURE: 163 MMHG

## 2020-03-01 VITALS — SYSTOLIC BLOOD PRESSURE: 128 MMHG | DIASTOLIC BLOOD PRESSURE: 75 MMHG

## 2020-03-01 LAB
BASOPHILS # BLD AUTO: 0 /CMM (ref 0–0.2)
BASOPHILS NFR BLD AUTO: 0.1 % (ref 0–2)
BUN SERPL-MCNC: 29 MG/DL (ref 7–18)
CALCIUM SERPL-MCNC: 8.1 MG/DL (ref 8.5–10.1)
CHLORIDE SERPL-SCNC: 103 MMOL/L (ref 98–107)
CO2 SERPL-SCNC: 36 MMOL/L (ref 21–32)
CREAT SERPL-MCNC: 0.8 MG/DL (ref 0.6–1.3)
EOSINOPHIL NFR BLD AUTO: 0 % (ref 0–6)
GLUCOSE SERPL-MCNC: 165 MG/DL (ref 74–106)
HCT VFR BLD AUTO: 30 % (ref 39–51)
HGB BLD-MCNC: 9.7 G/DL (ref 13.5–17.5)
LYMPHOCYTES NFR BLD AUTO: 1.4 /CMM (ref 0.8–4.8)
LYMPHOCYTES NFR BLD AUTO: 8.5 % (ref 20–44)
MAGNESIUM SERPL-MCNC: 1.8 MG/DL (ref 1.8–2.4)
MCHC RBC AUTO-ENTMCNC: 32 G/DL (ref 31–36)
MCV RBC AUTO: 90 FL (ref 80–96)
MONOCYTES NFR BLD AUTO: 1.1 /CMM (ref 0.1–1.3)
MONOCYTES NFR BLD AUTO: 7 % (ref 2–12)
NEUTROPHILS # BLD AUTO: 13.9 /CMM (ref 1.8–8.9)
NEUTROPHILS NFR BLD AUTO: 84.4 % (ref 43–81)
PHOSPHATE SERPL-MCNC: 2.4 MG/DL (ref 2.5–4.9)
PLATELET # BLD AUTO: 309 /CMM (ref 150–450)
POTASSIUM SERPL-SCNC: 4 MMOL/L (ref 3.5–5.1)
RBC # BLD AUTO: 3.36 MIL/UL (ref 4.5–6)
SODIUM SERPL-SCNC: 142 MMOL/L (ref 136–145)
WBC NRBC COR # BLD AUTO: 16.4 K/UL (ref 4.3–11)

## 2020-03-01 RX ADMIN — ALBUTEROL SULFATE SCH MG: 1.25 SOLUTION RESPIRATORY (INHALATION) at 19:08

## 2020-03-01 RX ADMIN — PANTOPRAZOLE SODIUM SCH MG: 40 TABLET, DELAYED RELEASE ORAL at 16:23

## 2020-03-01 RX ADMIN — ACETYLCYSTEINE SCH MG: 100 INHALANT RESPIRATORY (INHALATION) at 08:22

## 2020-03-01 RX ADMIN — ENOXAPARIN SODIUM SCH MG: 40 INJECTION SUBCUTANEOUS at 16:25

## 2020-03-01 RX ADMIN — ESCITALOPRAM OXALATE SCH MG: 10 TABLET, FILM COATED ORAL at 16:30

## 2020-03-01 RX ADMIN — GABAPENTIN SCH MG: 300 CAPSULE ORAL at 16:23

## 2020-03-01 RX ADMIN — MIRTAZAPINE SCH MG: 15 TABLET, FILM COATED ORAL at 21:26

## 2020-03-01 RX ADMIN — Medication SCH MG: at 08:22

## 2020-03-01 RX ADMIN — ALBUTEROL SULFATE SCH MG: 1.25 SOLUTION RESPIRATORY (INHALATION) at 01:52

## 2020-03-01 RX ADMIN — MELOXICAM SCH MG: 7.5 TABLET ORAL at 16:32

## 2020-03-01 RX ADMIN — ALBUTEROL SULFATE SCH MG: 1.25 SOLUTION RESPIRATORY (INHALATION) at 08:22

## 2020-03-01 RX ADMIN — Medication SCH OZ: at 16:28

## 2020-03-01 RX ADMIN — Medication SCH MG: at 19:08

## 2020-03-01 RX ADMIN — ACETYLCYSTEINE SCH MG: 100 INHALANT RESPIRATORY (INHALATION) at 14:57

## 2020-03-01 RX ADMIN — DEXTROSE MONOHYDRATE SCH MLS/HR: 50 INJECTION, SOLUTION INTRAVENOUS at 04:27

## 2020-03-01 RX ADMIN — MEMANTINE HYDROCHLORIDE SCH MG: 5 TABLET ORAL at 16:24

## 2020-03-01 RX ADMIN — MONTELUKAST SODIUM SCH MG: 10 TABLET, FILM COATED ORAL at 16:24

## 2020-03-01 RX ADMIN — Medication SCH MG: at 14:54

## 2020-03-01 RX ADMIN — ACETYLCYSTEINE SCH MG: 100 INHALANT RESPIRATORY (INHALATION) at 22:34

## 2020-03-01 RX ADMIN — ALBUTEROL SULFATE SCH MG: 1.25 SOLUTION RESPIRATORY (INHALATION) at 14:54

## 2020-03-01 NOTE — NUR
MS RN NOTES 



RECEIVED PATIENT AWAKE IN BED WITH NO DISTRESS NOTED. CALL LIGHT WITHIN REACH. CURRENTLY 
RECEIVING BREATHING TX AND TOLERATING WELL. NO C/O PAIN OR DISCOMFORT. PERIPHERAL LINE 
INTACT AND PATENT. ENCOURAGED USE OF CALL LIGHT FOR ASSISTANCE AND VERBALIZED GOOD 
UNDERSTANDING. ROOM FREE OF CLUTTER AND BELONGINGS KEPT NEAR BEDSIDE. WILL CONTINUE TO 
MONITOR

## 2020-03-01 NOTE — NUR
MS RN NOTE: REPORT NOTE

Patient in bed, remains on supplemental Oxygen 1LPM, tolerating well. IV antibiotic as 
scheduled, no acute events overnight. Fall precaution maintained. Plan for cont IV 
antibiotic, cont IV steroid, and breathing tx. Will endorse to Oncoming RN.

## 2020-03-02 VITALS — DIASTOLIC BLOOD PRESSURE: 92 MMHG | SYSTOLIC BLOOD PRESSURE: 160 MMHG

## 2020-03-02 VITALS — DIASTOLIC BLOOD PRESSURE: 70 MMHG | SYSTOLIC BLOOD PRESSURE: 130 MMHG

## 2020-03-02 LAB
BASOPHILS # BLD AUTO: 0 /CMM (ref 0–0.2)
BASOPHILS NFR BLD AUTO: 0.1 % (ref 0–2)
BUN SERPL-MCNC: 34 MG/DL (ref 7–18)
CALCIUM SERPL-MCNC: 8.4 MG/DL (ref 8.5–10.1)
CHLORIDE SERPL-SCNC: 103 MMOL/L (ref 98–107)
CO2 SERPL-SCNC: 35 MMOL/L (ref 21–32)
CREAT SERPL-MCNC: 0.9 MG/DL (ref 0.6–1.3)
EOSINOPHIL NFR BLD AUTO: 0 % (ref 0–6)
GLUCOSE SERPL-MCNC: 123 MG/DL (ref 74–106)
HCT VFR BLD AUTO: 30 % (ref 39–51)
HGB BLD-MCNC: 9.5 G/DL (ref 13.5–17.5)
LYMPHOCYTES NFR BLD AUTO: 1.5 /CMM (ref 0.8–4.8)
LYMPHOCYTES NFR BLD AUTO: 9.7 % (ref 20–44)
LYMPHOCYTES NFR BLD MANUAL: 10 % (ref 16–48)
MAGNESIUM SERPL-MCNC: 1.9 MG/DL (ref 1.8–2.4)
MCHC RBC AUTO-ENTMCNC: 32 G/DL (ref 31–36)
MCV RBC AUTO: 91 FL (ref 80–96)
MONOCYTES NFR BLD AUTO: 0.8 /CMM (ref 0.1–1.3)
MONOCYTES NFR BLD AUTO: 5.5 % (ref 2–12)
MONOCYTES NFR BLD MANUAL: 6 % (ref 0–11)
MYELOCYTES NFR BLD MANUAL: 1 % (ref 0–0)
NEUTROPHILS # BLD AUTO: 12.8 /CMM (ref 1.8–8.9)
NEUTROPHILS NFR BLD AUTO: 84.7 % (ref 43–81)
NEUTS SEG NFR BLD MANUAL: 83 % (ref 42–76)
PHOSPHATE SERPL-MCNC: 3.2 MG/DL (ref 2.5–4.9)
PLATELET # BLD AUTO: 270 /CMM (ref 150–450)
POTASSIUM SERPL-SCNC: 3.9 MMOL/L (ref 3.5–5.1)
RBC # BLD AUTO: 3.29 MIL/UL (ref 4.5–6)
SODIUM SERPL-SCNC: 143 MMOL/L (ref 136–145)
WBC NRBC COR # BLD AUTO: 15.1 K/UL (ref 4.3–11)

## 2020-03-02 RX ADMIN — MEMANTINE HYDROCHLORIDE SCH MG: 5 TABLET ORAL at 17:58

## 2020-03-02 RX ADMIN — ENOXAPARIN SODIUM SCH MG: 40 INJECTION SUBCUTANEOUS at 08:53

## 2020-03-02 RX ADMIN — ALBUTEROL SULFATE SCH MG: 1.25 SOLUTION RESPIRATORY (INHALATION) at 19:30

## 2020-03-02 RX ADMIN — ESCITALOPRAM OXALATE SCH MG: 10 TABLET, FILM COATED ORAL at 08:43

## 2020-03-02 RX ADMIN — MELOXICAM SCH MG: 7.5 TABLET ORAL at 08:46

## 2020-03-02 RX ADMIN — Medication SCH OZ: at 09:05

## 2020-03-02 RX ADMIN — DEXTROSE MONOHYDRATE SCH MLS/HR: 50 INJECTION, SOLUTION INTRAVENOUS at 04:19

## 2020-03-02 RX ADMIN — Medication SCH MG: at 08:01

## 2020-03-02 RX ADMIN — ALBUTEROL SULFATE SCH MG: 1.25 SOLUTION RESPIRATORY (INHALATION) at 00:42

## 2020-03-02 RX ADMIN — PANTOPRAZOLE SODIUM SCH MG: 40 TABLET, DELAYED RELEASE ORAL at 08:43

## 2020-03-02 RX ADMIN — ALBUTEROL SULFATE SCH MG: 1.25 SOLUTION RESPIRATORY (INHALATION) at 08:01

## 2020-03-02 RX ADMIN — Medication SCH MG: at 12:36

## 2020-03-02 RX ADMIN — ALBUTEROL SULFATE SCH MG: 1.25 SOLUTION RESPIRATORY (INHALATION) at 12:37

## 2020-03-02 RX ADMIN — MEMANTINE HYDROCHLORIDE SCH MG: 5 TABLET ORAL at 08:43

## 2020-03-02 RX ADMIN — MONTELUKAST SODIUM SCH MG: 10 TABLET, FILM COATED ORAL at 08:43

## 2020-03-02 RX ADMIN — Medication SCH MG: at 19:30

## 2020-03-02 RX ADMIN — GABAPENTIN SCH MG: 300 CAPSULE ORAL at 08:43

## 2020-03-02 RX ADMIN — ACETYLCYSTEINE SCH MG: 100 INHALANT RESPIRATORY (INHALATION) at 14:49

## 2020-03-02 RX ADMIN — ACETYLCYSTEINE SCH MG: 100 INHALANT RESPIRATORY (INHALATION) at 08:01

## 2020-03-02 NOTE — NUR
MS/RN DISCHARGE NOTES:



PATIENT IN STABLE CONDITION. WITH NO DISTRESS NOTED. GRAND SON AT BED SIDE. NO C/O PAIN OR 
DISCOMFORT. AWAITING TO BE PICKED UP BY FAMILY TO BE D/C TONIGHT.IV LINE REMOVED. EDUCATED 
ABOUT DISCHARGE INFORMATION. PATIENT LEFT THE UNIT IN STABLE CONDITION VIA WHEELCHAIR, WITH 
2L OF OXYGEN VIA NC.

## 2020-03-02 NOTE — NUR
MS RN NOTES 



PATIENT AWAKE IN BED WITH NO DISTRESS NOTED. CALL LIGHT WITHIN REACH. ALL   DUE MEDS GIVEN 
AS ORDERED AND TOLERATED WELL. PERIPHERAL LINE INTACT AND PATENT. ROOM FREE OF CLUTTER AND 
BELONGINGS KEPT NEAR BEDSIDE. BED IN LOW LOCK SETTING WITH BED ALARM ON AND FUNCTIONING 
PROPERLY. WILL ENDORSE TO ONCOMING SHIFT

## 2020-03-02 NOTE — NUR
MS/RN OPENING NOTES:



RECEIVED PATIENT ASLEEP IN BED WITH NO DISTRESS NOTED. CALL LIGHT WITHIN REACH. NO C/O PAIN 
OR DISCOMFORT. AWAITING TO BE PICKED UP BY FAMILY TO BE D/C TONIGHT. ACCORDING TO SON, HE IS 
ON THE WAY. WILL CONTINUE TO MONITOR PT. ACCORDINGLY.

## 2020-03-02 NOTE — NUR
PVR 352ML, REFUSES STRAIGHT CATH DESPITE EXPLANATIONS OF RISKS/BENEFITS. PER PATIENT WANTS 
TO TRY TO CONTINUE TO URINATE BY SELF AND IF PVR >300ML DURING 0600 CHECK, SHE WILL ALLOW 
FOR STRAIGHT CATH. WILL CONTINUE TO MONITOR 

-------------------------------------------------------------------------------

Addendum: 03/02/20 at 0522 by TIEN HERNANDEZ RN

-------------------------------------------------------------------------------

**ERROR WRONG PATIENT**

## 2020-03-02 NOTE — NUR
Patient cleared for D/c to home with caregiver. Exit care done. Patient awaiting for 
grandson to be picked up.

## 2020-03-04 ENCOUNTER — HOSPITAL ENCOUNTER (INPATIENT)
Dept: HOSPITAL 54 - MED | Age: 85
LOS: 8 days | Discharge: NURSING, REHAB OR LONG-TERM CARE | DRG: 140 | End: 2020-03-12
Attending: LEGAL MEDICINE | Admitting: LEGAL MEDICINE
Payer: COMMERCIAL

## 2020-03-04 VITALS — BODY MASS INDEX: 22.02 KG/M2 | HEIGHT: 66 IN | WEIGHT: 137 LBS

## 2020-03-04 VITALS — SYSTOLIC BLOOD PRESSURE: 112 MMHG | DIASTOLIC BLOOD PRESSURE: 63 MMHG

## 2020-03-04 DIAGNOSIS — J44.0: ICD-10-CM

## 2020-03-04 DIAGNOSIS — Z87.891: ICD-10-CM

## 2020-03-04 DIAGNOSIS — N17.9: ICD-10-CM
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
## 2020-03-04 DIAGNOSIS — Z79.51: ICD-10-CM

## 2020-03-04 DIAGNOSIS — Z79.899: ICD-10-CM

## 2020-03-04 DIAGNOSIS — F03.90: ICD-10-CM

## 2020-03-04 DIAGNOSIS — N40.0: ICD-10-CM

## 2020-03-04 DIAGNOSIS — J20.9: ICD-10-CM

## 2020-03-04 DIAGNOSIS — I25.10: ICD-10-CM

## 2020-03-04 DIAGNOSIS — R64: ICD-10-CM

## 2020-03-04 DIAGNOSIS — J44.1: Primary | ICD-10-CM

## 2020-03-04 DIAGNOSIS — I10: ICD-10-CM

## 2020-03-04 DIAGNOSIS — F32.9: ICD-10-CM

## 2020-03-04 DIAGNOSIS — K59.00: ICD-10-CM

## 2020-03-04 DIAGNOSIS — E87.6: ICD-10-CM

## 2020-03-04 DIAGNOSIS — J96.01: ICD-10-CM

## 2020-03-04 DIAGNOSIS — K21.9: ICD-10-CM

## 2020-03-04 DIAGNOSIS — M19.90: ICD-10-CM

## 2020-03-04 LAB
ALBUMIN SERPL BCP-MCNC: 2.1 G/DL (ref 3.4–5)
ALP SERPL-CCNC: 67 U/L (ref 46–116)
ALT SERPL W P-5'-P-CCNC: 29 U/L (ref 12–78)
AST SERPL W P-5'-P-CCNC: 19 U/L (ref 15–37)
BASOPHILS # BLD AUTO: 0 /CMM (ref 0–0.2)
BASOPHILS NFR BLD AUTO: 0.1 % (ref 0–2)
BILIRUB DIRECT SERPL-MCNC: 0.1 MG/DL (ref 0–0.2)
BILIRUB SERPL-MCNC: 0.2 MG/DL (ref 0.2–1)
BUN SERPL-MCNC: 37 MG/DL (ref 7–18)
CALCIUM SERPL-MCNC: 8.1 MG/DL (ref 8.5–10.1)
CHLORIDE SERPL-SCNC: 103 MMOL/L (ref 98–107)
CO2 SERPL-SCNC: 32 MMOL/L (ref 21–32)
CREAT SERPL-MCNC: 1.3 MG/DL (ref 0.6–1.3)
EOSINOPHIL NFR BLD AUTO: 0.4 % (ref 0–6)
GLUCOSE SERPL-MCNC: 269 MG/DL (ref 74–106)
HCT VFR BLD AUTO: 32 % (ref 39–51)
HGB BLD-MCNC: 9.9 G/DL (ref 13.5–17.5)
LYMPHOCYTES NFR BLD AUTO: 1.1 /CMM (ref 0.8–4.8)
LYMPHOCYTES NFR BLD AUTO: 6.5 % (ref 20–44)
MCHC RBC AUTO-ENTMCNC: 31 G/DL (ref 31–36)
MCV RBC AUTO: 91 FL (ref 80–96)
MONOCYTES NFR BLD AUTO: 1.1 /CMM (ref 0.1–1.3)
MONOCYTES NFR BLD AUTO: 6.4 % (ref 2–12)
NEUTROPHILS # BLD AUTO: 14.4 /CMM (ref 1.8–8.9)
NEUTROPHILS NFR BLD AUTO: 86.6 % (ref 43–81)
PLATELET # BLD AUTO: 222 /CMM (ref 150–450)
POTASSIUM SERPL-SCNC: 3.3 MMOL/L (ref 3.5–5.1)
PROT SERPL-MCNC: 5.8 G/DL (ref 6.4–8.2)
RBC # BLD AUTO: 3.48 MIL/UL (ref 4.5–6)
SODIUM SERPL-SCNC: 143 MMOL/L (ref 136–145)
WBC NRBC COR # BLD AUTO: 16.6 K/UL (ref 4.3–11)

## 2020-03-04 PROCEDURE — A4216 STERILE WATER/SALINE, 10 ML: HCPCS

## 2020-03-04 PROCEDURE — G0378 HOSPITAL OBSERVATION PER HR: HCPCS

## 2020-03-04 RX ADMIN — ALBUTEROL SULFATE SCH MG: 2.5 SOLUTION RESPIRATORY (INHALATION) at 19:30

## 2020-03-04 RX ADMIN — DEXTROSE AND SODIUM CHLORIDE PRN MLS/HR: 5; 900 INJECTION, SOLUTION INTRAVENOUS at 17:20

## 2020-03-04 RX ADMIN — PIPERACILLIN SODIUM AND TAZOBACTAM SODIUM SCH MLS/HR: .375; 3 INJECTION, POWDER, LYOPHILIZED, FOR SOLUTION INTRAVENOUS at 20:26

## 2020-03-04 RX ADMIN — Medication SCH MG: at 19:30

## 2020-03-04 RX ADMIN — ENOXAPARIN SODIUM SCH MG: 30 INJECTION SUBCUTANEOUS at 20:26

## 2020-03-04 NOTE — NUR
TELE RN ADMISSION NOTES



PT RECEIVED DIRECT ADMIT FROM DR. MATUTE'S OFFICE WITH CAREGIVER, VIA WHEELCHAIR AROUND 
1530. PT AWAKE, A/O X2-3. PT Uruguayan SPEAKING, FAMILY PRESENT AT BEDSIDE. PT ON 
SUPPLEMENTARY OXYGEN AT 3LPM, WITH NO ACUTE RESPIRATORY DISTRESS NOTED. PT DENIES ANY PAIN 
OR DISCOMFORT AT THIS TIME. ON TELEMONITORING ST HR 120S WITH PACS, PVCS AND OCCASIONAL 
BIGEMINY, MD/DR MATUTE AWARE; PT DENIES ANY CHEST PAIN. IVF D5NS AT 75ML/HR TO LEFT WRIST 
G22, INTACT AND OPERATIONAL. SKIN ASSESSED, INTACT ANG GENERALIZED BRUISING TO BUE, PICTURES 
TAKEN AND FILED IN THE CHART. ORDERS PLACED AND CARRIED OUT. PT KEPT COMFORTABLE. CALL LIGHT 
KEPT WITHIN REACH. PT'S BED IN LOWEST, LOCKED POSITION WITH SR X3. WILL CONTINUE TO MONITOR.

## 2020-03-04 NOTE — NUR
TELE RN NOTES



INFORMED DR MATUTE REGARDING LAB RESULTS. LACTIC ACID 3.7, PT ON D5NS INCREASED TO 
100ML/HR. POTASSIUM OF 3.3, ORDER PLACED FOR REPLACEMENT AS WELL. PT MADE ARE AND ORDER 
CARRIED OUT.

## 2020-03-04 NOTE — NUR
TELE RN NOTES



TANMAY FROM LAB CALLED TO REPORT LACTIC ACID OF 2.1. MD NOT CONTACTED BECAUSE IT IS 
TRENDING DOWN FROM PREVIOUS VALUE OF 3.3. WILL CONTINUE TO MONITOR.

## 2020-03-04 NOTE — NUR
TELE RN CLOSING NOTES



PT REMAINS IN BED, AWAKE, A/O X2-3. PT Mongolian SPEAKING, FAMILY PRESENT AT BEDSIDE. PT O 
SUPPLEMENTARY OXYGEN AT 3LPM, WITH NO ACUTE RESPIRATORY DISTRESS NOTED. PT DENIES ANY PAIN 
OR DISCOMFORT AT THIS TIME. ON TELEMONITORING ST  WITH PACS, PVCS AND OCCASIONAL 
MD YEMI/DR MATUTE AWARE; PT DENIES ANY CHEST PAIN. IVF D5NS AT 100ML/HR TO LEFT WRIST 
G22, INTACT AND OPERATIONAL. ALL NEEDS ATTENDED. CALL LIGHT KEPT WITHIN REACH. PT'S BED IN 
LOWEST, LOCKED POSITION WITH SR X3. WILL ENDORSE TO NIGHT SHIFT NURSE FOR TREVOR.

## 2020-03-04 NOTE — NUR
TELE RN OPENING NOTES



PATIENT RECEIVED RESTING IN BED, WELL A/O X 2-3 Frisian SPEAKING. PATIENT ON 3L OF O2 VIA 
NC WITH BREATHING EVEN EVEN AND UNLABORED, NO SOB NOTED. NO SIGNS OF ACUTE DISTRESS.  NO 
COMPLAINTS OF PAIN OR DISCOMFORT, NO FACIAL GRIMACING NOTED. TELE MONITOR READING SR 92 WITH 
PVC'S- MD AWARE. IV ON L WRIST #22 RUNNING D5 NS @ 100 ML/ HR. SAFETY PRECAUTIONS IN PLACE 
WITH BED IN LOWEST POSITION, CALL LIGHT WITHIN REACH, SIDE RAILS UP X2, AND BREAKS ON. WILL 
CONTINUE TO MONITOR.

## 2020-03-05 VITALS — SYSTOLIC BLOOD PRESSURE: 104 MMHG | DIASTOLIC BLOOD PRESSURE: 73 MMHG

## 2020-03-05 VITALS — DIASTOLIC BLOOD PRESSURE: 64 MMHG | SYSTOLIC BLOOD PRESSURE: 107 MMHG

## 2020-03-05 VITALS — DIASTOLIC BLOOD PRESSURE: 79 MMHG | SYSTOLIC BLOOD PRESSURE: 128 MMHG

## 2020-03-05 LAB
BASE EXCESS BLDA CALC-SCNC: 4.4 MMOL/L
BASOPHILS # BLD AUTO: 0 /CMM (ref 0–0.2)
BASOPHILS NFR BLD AUTO: 0.1 % (ref 0–2)
BUN SERPL-MCNC: 36 MG/DL (ref 7–18)
CALCIUM SERPL-MCNC: 8.3 MG/DL (ref 8.5–10.1)
CHLORIDE SERPL-SCNC: 109 MMOL/L (ref 98–107)
CO2 SERPL-SCNC: 33 MMOL/L (ref 21–32)
CREAT SERPL-MCNC: 1.2 MG/DL (ref 0.6–1.3)
DO-HGB MFR BLDA: 93.8 MMHG
EOSINOPHIL NFR BLD AUTO: 0 % (ref 0–6)
GLUCOSE SERPL-MCNC: 198 MG/DL (ref 74–106)
HCT VFR BLD AUTO: 30 % (ref 39–51)
HGB BLD-MCNC: 9.6 G/DL (ref 13.5–17.5)
INHALED O2 CONCENTRATION: 33 %
INHALED O2 FLOW RATE: 3 L/MIN (ref 0–30)
LYMPHOCYTES NFR BLD AUTO: 0.3 /CMM (ref 0.8–4.8)
LYMPHOCYTES NFR BLD AUTO: 2.5 % (ref 20–44)
MAGNESIUM SERPL-MCNC: 2.2 MG/DL (ref 1.8–2.4)
MCHC RBC AUTO-ENTMCNC: 32 G/DL (ref 31–36)
MCV RBC AUTO: 90 FL (ref 80–96)
MONOCYTES NFR BLD AUTO: 0.4 /CMM (ref 0.1–1.3)
MONOCYTES NFR BLD AUTO: 3.2 % (ref 2–12)
NEUTROPHILS # BLD AUTO: 12.5 /CMM (ref 1.8–8.9)
NEUTROPHILS NFR BLD AUTO: 94.2 % (ref 43–81)
PCO2 TEMP ADJ BLDA: 44.1 MMHG (ref 35–45)
PH TEMP ADJ BLDA: 7.44 [PH] (ref 7.35–7.45)
PLATELET # BLD AUTO: 189 /CMM (ref 150–450)
PO2 TEMP ADJ BLDA: 90 MMHG (ref 75–100)
POTASSIUM SERPL-SCNC: 5 MMOL/L (ref 3.5–5.1)
RBC # BLD AUTO: 3.35 MIL/UL (ref 4.5–6)
SAO2 % BLDA: 96.4 % (ref 92–98.5)
SODIUM SERPL-SCNC: 147 MMOL/L (ref 136–145)
WBC NRBC COR # BLD AUTO: 13.2 K/UL (ref 4.3–11)

## 2020-03-05 RX ADMIN — Medication SCH MG: at 13:35

## 2020-03-05 RX ADMIN — DEXTROSE AND SODIUM CHLORIDE PRN MLS/HR: 5; 900 INJECTION, SOLUTION INTRAVENOUS at 06:44

## 2020-03-05 RX ADMIN — ALBUTEROL SULFATE SCH MG: 2.5 SOLUTION RESPIRATORY (INHALATION) at 08:38

## 2020-03-05 RX ADMIN — PIPERACILLIN SODIUM AND TAZOBACTAM SODIUM SCH MLS/HR: .375; 3 INJECTION, POWDER, LYOPHILIZED, FOR SOLUTION INTRAVENOUS at 02:01

## 2020-03-05 RX ADMIN — PANTOPRAZOLE SODIUM SCH MG: 40 TABLET, DELAYED RELEASE ORAL at 08:08

## 2020-03-05 RX ADMIN — ALBUTEROL SULFATE SCH MG: 2.5 SOLUTION RESPIRATORY (INHALATION) at 19:05

## 2020-03-05 RX ADMIN — DEXTROSE AND SODIUM CHLORIDE PRN MLS/HR: 5; 900 INJECTION, SOLUTION INTRAVENOUS at 21:45

## 2020-03-05 RX ADMIN — ALBUTEROL SCH MG: 2 TABLET ORAL at 17:16

## 2020-03-05 RX ADMIN — Medication SCH MG: at 08:38

## 2020-03-05 RX ADMIN — MONTELUKAST SODIUM SCH MG: 10 TABLET, FILM COATED ORAL at 17:16

## 2020-03-05 RX ADMIN — Medication SCH MG: at 19:05

## 2020-03-05 RX ADMIN — ACETYLCYSTEINE SCH MG: 100 INHALANT RESPIRATORY (INHALATION) at 23:22

## 2020-03-05 RX ADMIN — PIPERACILLIN SODIUM AND TAZOBACTAM SODIUM SCH MLS/HR: .375; 3 INJECTION, POWDER, LYOPHILIZED, FOR SOLUTION INTRAVENOUS at 08:13

## 2020-03-05 RX ADMIN — PIPERACILLIN SODIUM AND TAZOBACTAM SODIUM SCH MLS/HR: .375; 3 INJECTION, POWDER, LYOPHILIZED, FOR SOLUTION INTRAVENOUS at 15:29

## 2020-03-05 RX ADMIN — ACETYLCYSTEINE SCH MG: 100 INHALANT RESPIRATORY (INHALATION) at 16:00

## 2020-03-05 RX ADMIN — MEMANTINE HYDROCHLORIDE SCH MG: 5 TABLET ORAL at 17:16

## 2020-03-05 RX ADMIN — ENOXAPARIN SODIUM SCH MG: 30 INJECTION SUBCUTANEOUS at 21:40

## 2020-03-05 RX ADMIN — MELOXICAM SCH MG: 7.5 TABLET ORAL at 21:38

## 2020-03-05 RX ADMIN — PIPERACILLIN SODIUM AND TAZOBACTAM SODIUM SCH MLS/HR: .375; 3 INJECTION, POWDER, LYOPHILIZED, FOR SOLUTION INTRAVENOUS at 21:38

## 2020-03-05 RX ADMIN — ALBUTEROL SULFATE SCH MG: 2.5 SOLUTION RESPIRATORY (INHALATION) at 13:35

## 2020-03-05 RX ADMIN — MIRTAZAPINE SCH MG: 15 TABLET, FILM COATED ORAL at 21:38

## 2020-03-05 NOTE — NUR
RN MS NOTES

CLARIFIED MEDS WITH DR. MATUTE, PER MD PT IS TAKING REMERON AT BEDTIME, D/C TRAZODONE, 
PHARMACY INFORMED.

## 2020-03-05 NOTE — NUR
RN MS NOTES

PT IN BED, AWAKE, ALERT AND ORIENTED, NO SOB, CALL LIGHT WITHIN REACH, ASSISTED WITH 
REPOSITIONING, IV FLUIDS INFUSING WELL, KEPT WARM AND COMFORTABLE IN BED.

## 2020-03-05 NOTE — NUR
RN MS NOTES

PT IN BED, AWAKE, ALERT AND VERBALLY RESPONSIVE, DENIES PAIN, NOT IN DISTRESS, IV FLUIDS 
INFUSING WELL, BREATHING TREATMENTS GIVEN AS ORDERED, TOLERATES CURRENT DIET, ALL NEEDS 
ATTENDED.

## 2020-03-05 NOTE — NUR
TELE RN CLOSING NOTES



PATIENT IN BED, AWAKE, A/O X2, Spanish SPEAKING. PT ON SUPPLEMENTARY OXYGEN AT 2.5LPM, WITH 
NO ACUTE RESPIRATORY DISTRESS NOTED. PT DENIES ANY PAIN OR DISCOMFORT AT THIS TIME. ON 
TELEMONITORING ST  WITH PJCS, PVCS AND OCCASIONAL BUNDLE BRANCH BLOCK; PT DENIES ANY 
CHEST PAIN. IVF D5NS AT 100ML/HR TO LEFT WRIST G22, INTACT AND OPERATIONAL. ALL NEEDS 
ATTENDED. PATIENT KEPT CLEAN AND DRY THROUGHOUT THE NIGHT. CALL LIGHT KEPT WITHIN REACH. 
PT'S BED IN LOWEST, LOCKED POSITION WITH SR X2. WILL ENDORSE TO ONCOMING SHIFT ABOUT TREVOR.

## 2020-03-05 NOTE — NUR
RN TELE NOTES

PT IN BED, ASLEEP, EASILY AROUSABLE, ALERT AND VERBALLY RESPONSIVE, NO COMPLAINT OF PAIN, NO 
SHORTNESS OF BREATH NOTED, CALL LIGHT WITHIN REACH, IV FLUIDS INFUSING WELL, KEPT WARM AND 
COMFORTABLE IN BED.

## 2020-03-06 VITALS — SYSTOLIC BLOOD PRESSURE: 135 MMHG | DIASTOLIC BLOOD PRESSURE: 80 MMHG

## 2020-03-06 VITALS — SYSTOLIC BLOOD PRESSURE: 127 MMHG | DIASTOLIC BLOOD PRESSURE: 71 MMHG

## 2020-03-06 VITALS — SYSTOLIC BLOOD PRESSURE: 131 MMHG | DIASTOLIC BLOOD PRESSURE: 64 MMHG

## 2020-03-06 LAB
BASOPHILS # BLD AUTO: 0 /CMM (ref 0–0.2)
BASOPHILS NFR BLD AUTO: 0 % (ref 0–2)
BUN SERPL-MCNC: 37 MG/DL (ref 7–18)
CALCIUM SERPL-MCNC: 7.9 MG/DL (ref 8.5–10.1)
CHLORIDE SERPL-SCNC: 107 MMOL/L (ref 98–107)
CO2 SERPL-SCNC: 28 MMOL/L (ref 21–32)
CREAT SERPL-MCNC: 1.3 MG/DL (ref 0.6–1.3)
EOSINOPHIL NFR BLD AUTO: 0 % (ref 0–6)
GLUCOSE SERPL-MCNC: 195 MG/DL (ref 74–106)
HCT VFR BLD AUTO: 26 % (ref 39–51)
HGB BLD-MCNC: 8 G/DL (ref 13.5–17.5)
LYMPHOCYTES NFR BLD AUTO: 0.3 /CMM (ref 0.8–4.8)
LYMPHOCYTES NFR BLD AUTO: 2 % (ref 20–44)
MAGNESIUM SERPL-MCNC: 1.9 MG/DL (ref 1.8–2.4)
MCHC RBC AUTO-ENTMCNC: 31 G/DL (ref 31–36)
MCV RBC AUTO: 90 FL (ref 80–96)
MONOCYTES NFR BLD AUTO: 0.4 /CMM (ref 0.1–1.3)
MONOCYTES NFR BLD AUTO: 2.7 % (ref 2–12)
NEUTROPHILS # BLD AUTO: 14.5 /CMM (ref 1.8–8.9)
NEUTROPHILS NFR BLD AUTO: 95.3 % (ref 43–81)
PLATELET # BLD AUTO: 186 /CMM (ref 150–450)
POTASSIUM SERPL-SCNC: 3.9 MMOL/L (ref 3.5–5.1)
RBC # BLD AUTO: 2.85 MIL/UL (ref 4.5–6)
SODIUM SERPL-SCNC: 144 MMOL/L (ref 136–145)
WBC NRBC COR # BLD AUTO: 15.2 K/UL (ref 4.3–11)

## 2020-03-06 RX ADMIN — PIPERACILLIN SODIUM AND TAZOBACTAM SODIUM SCH MLS/HR: .375; 3 INJECTION, POWDER, LYOPHILIZED, FOR SOLUTION INTRAVENOUS at 08:18

## 2020-03-06 RX ADMIN — ESCITALOPRAM OXALATE SCH MG: 10 TABLET, FILM COATED ORAL at 08:18

## 2020-03-06 RX ADMIN — MELOXICAM SCH MG: 7.5 TABLET ORAL at 08:21

## 2020-03-06 RX ADMIN — DEXTROSE AND SODIUM CHLORIDE PRN MLS/HR: 5; 900 INJECTION, SOLUTION INTRAVENOUS at 08:35

## 2020-03-06 RX ADMIN — PIPERACILLIN SODIUM AND TAZOBACTAM SODIUM SCH MLS/HR: .375; 3 INJECTION, POWDER, LYOPHILIZED, FOR SOLUTION INTRAVENOUS at 15:45

## 2020-03-06 RX ADMIN — PANTOPRAZOLE SODIUM SCH MG: 40 TABLET, DELAYED RELEASE ORAL at 08:11

## 2020-03-06 RX ADMIN — ALBUTEROL SCH MG: 2 TABLET ORAL at 08:23

## 2020-03-06 RX ADMIN — Medication SCH MG: at 19:49

## 2020-03-06 RX ADMIN — MELOXICAM SCH MG: 7.5 TABLET ORAL at 21:37

## 2020-03-06 RX ADMIN — ALBUTEROL SCH MG: 2 TABLET ORAL at 17:30

## 2020-03-06 RX ADMIN — MONTELUKAST SODIUM SCH MG: 10 TABLET, FILM COATED ORAL at 17:30

## 2020-03-06 RX ADMIN — MEMANTINE HYDROCHLORIDE SCH MG: 5 TABLET ORAL at 08:19

## 2020-03-06 RX ADMIN — ENOXAPARIN SODIUM SCH MG: 30 INJECTION SUBCUTANEOUS at 21:38

## 2020-03-06 RX ADMIN — MIRTAZAPINE SCH MG: 15 TABLET, FILM COATED ORAL at 21:37

## 2020-03-06 RX ADMIN — ALBUTEROL SULFATE SCH MG: 2.5 SOLUTION RESPIRATORY (INHALATION) at 13:00

## 2020-03-06 RX ADMIN — GABAPENTIN SCH MG: 300 CAPSULE ORAL at 08:18

## 2020-03-06 RX ADMIN — PIPERACILLIN SODIUM AND TAZOBACTAM SODIUM SCH MLS/HR: .375; 3 INJECTION, POWDER, LYOPHILIZED, FOR SOLUTION INTRAVENOUS at 03:02

## 2020-03-06 RX ADMIN — ALBUTEROL SULFATE SCH MG: 2.5 SOLUTION RESPIRATORY (INHALATION) at 19:49

## 2020-03-06 RX ADMIN — MEMANTINE HYDROCHLORIDE SCH MG: 5 TABLET ORAL at 17:30

## 2020-03-06 RX ADMIN — ALBUTEROL SULFATE PRN MG: 2.5 SOLUTION RESPIRATORY (INHALATION) at 17:11

## 2020-03-06 RX ADMIN — PANTOPRAZOLE SODIUM SCH MG: 40 TABLET, DELAYED RELEASE ORAL at 07:30

## 2020-03-06 RX ADMIN — ACETYLCYSTEINE SCH MG: 100 INHALANT RESPIRATORY (INHALATION) at 07:51

## 2020-03-06 RX ADMIN — PIPERACILLIN SODIUM AND TAZOBACTAM SODIUM SCH MLS/HR: .375; 3 INJECTION, POWDER, LYOPHILIZED, FOR SOLUTION INTRAVENOUS at 21:37

## 2020-03-06 RX ADMIN — ALBUTEROL SULFATE SCH MG: 2.5 SOLUTION RESPIRATORY (INHALATION) at 07:51

## 2020-03-06 RX ADMIN — ACETYLCYSTEINE SCH MG: 100 INHALANT RESPIRATORY (INHALATION) at 23:53

## 2020-03-06 RX ADMIN — Medication SCH MG: at 07:51

## 2020-03-06 RX ADMIN — Medication SCH MG: at 13:00

## 2020-03-06 RX ADMIN — ACETYLCYSTEINE SCH MG: 100 INHALANT RESPIRATORY (INHALATION) at 17:09

## 2020-03-06 NOTE — NUR
MS RN NOTE:



PATIENT RESTING IN BED, NO ACUTE DISTRESS NOTED. BREATHING EVEN AND UNLABORED, NO SOB NOTED. 
IV TO RFA IN PLACE INFUSING D5NS AT 100ML/HR. BED LOCKED AND IN LOWEST POSITION, CALL LIGHT 
IN REACH. WILL CONTINUE TO MONITOR.

## 2020-03-06 NOTE — NUR
MS/RN Closing note



Patient resting in bed comfortably, no appears pain or discomfort. Skin is warm to touch, 
clean/dry, intact picc line/IV site. Respiratory even and unlabored, provided oral care. 
patient is on TPN, no s/s of fluid over load observed, stable blood sugar level. Kept lower 
position of the bed with elevated HOB. Call light within reach, will endorse to night shift.

-------------------------------------------------------------------------------

Addendum: 03/06/20 at 1938 by SARAH OCAMPO RN

-------------------------------------------------------------------------------

Wrong pt

## 2020-03-06 NOTE — NUR
RN NOTES

SLEEPING BUT AROUSABLE,MORNING CARE RENDERED, DENIES PAIN, NO SOB, CALL LIGHT WITHIN REACH, 
[id number], PT. NEEDS ATTENDED

## 2020-03-06 NOTE — NUR
MS/RN Closing note



Patient resting in bed comfortably, no appears pain or discomfort. Skin is warm to touch, 
clean/dry, intact IV site. Respiratory even and unlabored. patient is on IVF, no s/s of 
fluid over load observed, stable v/s. Kept lower position of the bed with elevated HOB. Call 
light within reach, will endorse to night shift.

## 2020-03-06 NOTE — NUR
MS/RN Opening note



Received patient in bed, AO x 2-3, able to responds all stimuli. Patient on oxygen at 3LPM, 
no s/s of respiratory distress observed, skin is warm to touch, kept clean/dry, intact IV. 
No adverse reaction from ATB IV, keep lower position of the bed with elevated HOB. Can light 
within reach, will continue to monitor.

## 2020-03-07 VITALS — SYSTOLIC BLOOD PRESSURE: 153 MMHG | DIASTOLIC BLOOD PRESSURE: 94 MMHG

## 2020-03-07 VITALS — DIASTOLIC BLOOD PRESSURE: 88 MMHG | SYSTOLIC BLOOD PRESSURE: 157 MMHG

## 2020-03-07 VITALS — DIASTOLIC BLOOD PRESSURE: 88 MMHG | SYSTOLIC BLOOD PRESSURE: 139 MMHG

## 2020-03-07 VITALS — DIASTOLIC BLOOD PRESSURE: 79 MMHG | SYSTOLIC BLOOD PRESSURE: 122 MMHG

## 2020-03-07 LAB
ALBUMIN SERPL BCP-MCNC: 2 G/DL (ref 3.4–5)
ALP SERPL-CCNC: 55 U/L (ref 46–116)
ALT SERPL W P-5'-P-CCNC: 31 U/L (ref 12–78)
AST SERPL W P-5'-P-CCNC: 18 U/L (ref 15–37)
BASOPHILS # BLD AUTO: 0 /CMM (ref 0–0.2)
BASOPHILS NFR BLD AUTO: 0.1 % (ref 0–2)
BILIRUB SERPL-MCNC: 0.2 MG/DL (ref 0.2–1)
BUN SERPL-MCNC: 35 MG/DL (ref 7–18)
CALCIUM SERPL-MCNC: 8 MG/DL (ref 8.5–10.1)
CHLORIDE SERPL-SCNC: 107 MMOL/L (ref 98–107)
CO2 SERPL-SCNC: 30 MMOL/L (ref 21–32)
CREAT SERPL-MCNC: 1.2 MG/DL (ref 0.6–1.3)
EOSINOPHIL NFR BLD AUTO: 0 % (ref 0–6)
FERRITIN SERPL-MCNC: 951 NG/ML (ref 8–388)
GLUCOSE SERPL-MCNC: 182 MG/DL (ref 74–106)
HCT VFR BLD AUTO: 27 % (ref 39–51)
HGB BLD-MCNC: 8.6 G/DL (ref 13.5–17.5)
IRON SERPL-MCNC: 69 UG/DL (ref 50–175)
LYMPHOCYTES NFR BLD AUTO: 0.4 /CMM (ref 0.8–4.8)
LYMPHOCYTES NFR BLD AUTO: 2.4 % (ref 20–44)
MAGNESIUM SERPL-MCNC: 1.8 MG/DL (ref 1.8–2.4)
MCHC RBC AUTO-ENTMCNC: 32 G/DL (ref 31–36)
MCV RBC AUTO: 91 FL (ref 80–96)
MONOCYTES NFR BLD AUTO: 0.8 /CMM (ref 0.1–1.3)
MONOCYTES NFR BLD AUTO: 5.6 % (ref 2–12)
NEUTROPHILS # BLD AUTO: 13.6 /CMM (ref 1.8–8.9)
NEUTROPHILS NFR BLD AUTO: 91.9 % (ref 43–81)
PHOSPHATE SERPL-MCNC: 2.3 MG/DL (ref 2.5–4.9)
PLATELET # BLD AUTO: 201 /CMM (ref 150–450)
POTASSIUM SERPL-SCNC: 3.9 MMOL/L (ref 3.5–5.1)
PROT SERPL-MCNC: 5.7 G/DL (ref 6.4–8.2)
RBC # BLD AUTO: 2.99 MIL/UL (ref 4.5–6)
SODIUM SERPL-SCNC: 145 MMOL/L (ref 136–145)
TIBC SERPL-MCNC: 177 UG/DL (ref 250–450)
WBC NRBC COR # BLD AUTO: 14.8 K/UL (ref 4.3–11)

## 2020-03-07 RX ADMIN — PIPERACILLIN SODIUM AND TAZOBACTAM SODIUM SCH MLS/HR: .375; 3 INJECTION, POWDER, LYOPHILIZED, FOR SOLUTION INTRAVENOUS at 14:11

## 2020-03-07 RX ADMIN — PIPERACILLIN SODIUM AND TAZOBACTAM SODIUM SCH MLS/HR: .375; 3 INJECTION, POWDER, LYOPHILIZED, FOR SOLUTION INTRAVENOUS at 21:05

## 2020-03-07 RX ADMIN — ALBUTEROL SULFATE SCH MG: 2.5 SOLUTION RESPIRATORY (INHALATION) at 20:05

## 2020-03-07 RX ADMIN — ALBUTEROL SULFATE SCH MG: 2.5 SOLUTION RESPIRATORY (INHALATION) at 13:18

## 2020-03-07 RX ADMIN — ESCITALOPRAM OXALATE SCH MG: 10 TABLET, FILM COATED ORAL at 08:55

## 2020-03-07 RX ADMIN — ALBUTEROL SULFATE SCH MG: 2.5 SOLUTION RESPIRATORY (INHALATION) at 07:33

## 2020-03-07 RX ADMIN — ALBUTEROL SCH MG: 2 TABLET ORAL at 09:00

## 2020-03-07 RX ADMIN — MIRTAZAPINE SCH MG: 15 TABLET, FILM COATED ORAL at 21:05

## 2020-03-07 RX ADMIN — PIPERACILLIN SODIUM AND TAZOBACTAM SODIUM SCH MLS/HR: .375; 3 INJECTION, POWDER, LYOPHILIZED, FOR SOLUTION INTRAVENOUS at 03:38

## 2020-03-07 RX ADMIN — GABAPENTIN SCH MG: 300 CAPSULE ORAL at 08:55

## 2020-03-07 RX ADMIN — MEMANTINE HYDROCHLORIDE SCH MG: 5 TABLET ORAL at 17:21

## 2020-03-07 RX ADMIN — Medication SCH MG: at 13:18

## 2020-03-07 RX ADMIN — MEMANTINE HYDROCHLORIDE SCH MG: 5 TABLET ORAL at 08:55

## 2020-03-07 RX ADMIN — PANTOPRAZOLE SODIUM SCH MG: 40 TABLET, DELAYED RELEASE ORAL at 08:55

## 2020-03-07 RX ADMIN — PIPERACILLIN SODIUM AND TAZOBACTAM SODIUM SCH MLS/HR: .375; 3 INJECTION, POWDER, LYOPHILIZED, FOR SOLUTION INTRAVENOUS at 08:56

## 2020-03-07 RX ADMIN — Medication SCH MG: at 07:33

## 2020-03-07 RX ADMIN — DEXTROSE AND SODIUM CHLORIDE PRN MLS/HR: 5; 900 INJECTION, SOLUTION INTRAVENOUS at 03:38

## 2020-03-07 RX ADMIN — ENOXAPARIN SODIUM SCH MG: 30 INJECTION SUBCUTANEOUS at 21:06

## 2020-03-07 RX ADMIN — MELOXICAM SCH MG: 7.5 TABLET ORAL at 21:05

## 2020-03-07 RX ADMIN — ACETYLCYSTEINE SCH MG: 100 INHALANT RESPIRATORY (INHALATION) at 23:28

## 2020-03-07 RX ADMIN — MONTELUKAST SODIUM SCH MG: 10 TABLET, FILM COATED ORAL at 17:21

## 2020-03-07 RX ADMIN — ACETYLCYSTEINE SCH MG: 100 INHALANT RESPIRATORY (INHALATION) at 07:33

## 2020-03-07 RX ADMIN — ALBUTEROL SCH MG: 2 TABLET ORAL at 18:06

## 2020-03-07 RX ADMIN — MELOXICAM SCH MG: 7.5 TABLET ORAL at 09:04

## 2020-03-07 RX ADMIN — ACETYLCYSTEINE SCH MG: 100 INHALANT RESPIRATORY (INHALATION) at 15:28

## 2020-03-07 RX ADMIN — Medication SCH MG: at 20:05

## 2020-03-07 NOTE — NUR
MS RN OPENING NOTES



Received patient A/O x2, awake on bed, on O2 inhalation via NC @ 2LPM, no SOB/respiratory 
distress noted at this time. Patient denies any discomfort at this time. Kept on bed clean, 
dry and comfortable. Call light within easy reach. On fall and aspiration precautions. Will 
continue to monitor accordingly.

## 2020-03-07 NOTE — NUR
MS RN NOTE:



PATIENT SLEEPING IN BED, NO ACUTE DISTRESS NOTED. BREATHING EVEN AND UNLABORED, NO SOB 
NOTED. BED LOCKED AND IN LOWEST POSITION, CALL LIGHT IN REACH. WILL CONTINUE TO MONITOR.

## 2020-03-07 NOTE — NUR
Patient resting in bed comfortably, no pain or distress noted.  IV site intact and patent. 
Respiration even and unlabored. Patient kept clean and dry. Safety precautions in place, 
call light within reach, will endorse to night shift for TREOVR

## 2020-03-07 NOTE — NUR
MS RN NOTE:



PATIENT RESTING IN BED, NO ACUTE DISTRESS NOTED. BREATHING EVEN AND UNLABORED, NO SOB NOTED. 
IV TO RFA IN PLACE INFUSING D5NS AT 100ML/HR. BED LOCKED AND IN LOWEST POSITION, CALL LIGHT 
IN REACH. WILL ENDORSE TO DAY NURSE TO CONTINUE WITH PLAN OF CARE.

## 2020-03-08 VITALS — DIASTOLIC BLOOD PRESSURE: 94 MMHG | SYSTOLIC BLOOD PRESSURE: 153 MMHG

## 2020-03-08 VITALS — SYSTOLIC BLOOD PRESSURE: 149 MMHG | DIASTOLIC BLOOD PRESSURE: 83 MMHG

## 2020-03-08 VITALS — DIASTOLIC BLOOD PRESSURE: 86 MMHG | SYSTOLIC BLOOD PRESSURE: 138 MMHG

## 2020-03-08 VITALS — DIASTOLIC BLOOD PRESSURE: 83 MMHG | SYSTOLIC BLOOD PRESSURE: 149 MMHG

## 2020-03-08 RX ADMIN — PIPERACILLIN SODIUM AND TAZOBACTAM SODIUM SCH MLS/HR: .375; 3 INJECTION, POWDER, LYOPHILIZED, FOR SOLUTION INTRAVENOUS at 22:02

## 2020-03-08 RX ADMIN — ALBUTEROL SCH MG: 2 TABLET ORAL at 17:06

## 2020-03-08 RX ADMIN — Medication SCH MG: at 19:09

## 2020-03-08 RX ADMIN — PANTOPRAZOLE SODIUM SCH MG: 40 TABLET, DELAYED RELEASE ORAL at 07:34

## 2020-03-08 RX ADMIN — MELOXICAM SCH MG: 7.5 TABLET ORAL at 08:45

## 2020-03-08 RX ADMIN — Medication SCH MG: at 17:09

## 2020-03-08 RX ADMIN — ALBUTEROL SULFATE SCH MG: 2.5 SOLUTION RESPIRATORY (INHALATION) at 13:25

## 2020-03-08 RX ADMIN — ACETYLCYSTEINE SCH MG: 100 INHALANT RESPIRATORY (INHALATION) at 17:09

## 2020-03-08 RX ADMIN — MIRTAZAPINE SCH MG: 15 TABLET, FILM COATED ORAL at 21:59

## 2020-03-08 RX ADMIN — ALBUTEROL SULFATE SCH MG: 2.5 SOLUTION RESPIRATORY (INHALATION) at 19:09

## 2020-03-08 RX ADMIN — PIPERACILLIN SODIUM AND TAZOBACTAM SODIUM SCH MLS/HR: .375; 3 INJECTION, POWDER, LYOPHILIZED, FOR SOLUTION INTRAVENOUS at 09:51

## 2020-03-08 RX ADMIN — MONTELUKAST SODIUM SCH MG: 10 TABLET, FILM COATED ORAL at 17:06

## 2020-03-08 RX ADMIN — ALBUTEROL SCH MG: 2 TABLET ORAL at 08:42

## 2020-03-08 RX ADMIN — DEXTROSE AND SODIUM CHLORIDE PRN MLS/HR: 5; 900 INJECTION, SOLUTION INTRAVENOUS at 23:57

## 2020-03-08 RX ADMIN — Medication SCH MG: at 08:49

## 2020-03-08 RX ADMIN — MELOXICAM SCH MG: 7.5 TABLET ORAL at 22:00

## 2020-03-08 RX ADMIN — DEXTROSE AND SODIUM CHLORIDE PRN MLS/HR: 5; 900 INJECTION, SOLUTION INTRAVENOUS at 10:01

## 2020-03-08 RX ADMIN — GABAPENTIN SCH MG: 300 CAPSULE ORAL at 08:42

## 2020-03-08 RX ADMIN — ENOXAPARIN SODIUM SCH MG: 30 INJECTION SUBCUTANEOUS at 22:02

## 2020-03-08 RX ADMIN — ESCITALOPRAM OXALATE SCH MG: 10 TABLET, FILM COATED ORAL at 08:41

## 2020-03-08 RX ADMIN — ACETYLCYSTEINE SCH MG: 100 INHALANT RESPIRATORY (INHALATION) at 08:50

## 2020-03-08 RX ADMIN — PIPERACILLIN SODIUM AND TAZOBACTAM SODIUM SCH MLS/HR: .375; 3 INJECTION, POWDER, LYOPHILIZED, FOR SOLUTION INTRAVENOUS at 03:07

## 2020-03-08 RX ADMIN — ALBUTEROL SULFATE SCH MG: 2.5 SOLUTION RESPIRATORY (INHALATION) at 08:49

## 2020-03-08 RX ADMIN — ACETYLCYSTEINE SCH MG: 100 INHALANT RESPIRATORY (INHALATION) at 22:52

## 2020-03-08 RX ADMIN — MEMANTINE HYDROCHLORIDE SCH MG: 5 TABLET ORAL at 08:42

## 2020-03-08 RX ADMIN — PIPERACILLIN SODIUM AND TAZOBACTAM SODIUM SCH MLS/HR: .375; 3 INJECTION, POWDER, LYOPHILIZED, FOR SOLUTION INTRAVENOUS at 14:25

## 2020-03-08 RX ADMIN — MEMANTINE HYDROCHLORIDE SCH MG: 5 TABLET ORAL at 17:06

## 2020-03-08 NOTE — NUR
MS RN OPENING NOTE



RECEIVED PATIENT IN BED. A/OX 2- 3, PATIENT IS Kyrgyz SPEAKING, ABLE TO MAKE BASIC NEEDS 
KNOWN. ON OXYGEN 2L/MIN VIA NASAL CANNULA. RESPIRATIONS ARE EVEN AND UNLABORED. NO S/S SOB 
NOTED. DENIES PAIN AT THIS TIME. EXTERNAL TELE MONITOR READS SR WITH BBB WITH PVCS HR 95. IN 
NO APPARENT DISTRESS. IV ACCESS IN RFA#22 RUNNING D5NS@100ML/HR. BED IS LOW AND LOCKED, HOB 
ELEVATED IN HIGH FOWLERS, SIDE RAILS UP X2, BED ALARM ON. CALL LIGHT WITHIN REACH. WILL 
CONTINUE TO MONITOR.

## 2020-03-08 NOTE — NUR
MS RN CLOSING NOTES



Patient asleep, easily awaken. No new complaints made within the shift. All due meds given 
as ordered. All nursing needs attended. Kept on bed clean, dry and comfortable. On fall and 
aspiration precautions. Call light within easy reach. Endorsed.

## 2020-03-08 NOTE — NUR
MS/RN Opening note



Received patient AOx 2, able to responds all stimuli. patient does no c/o pain or 
discomfort. Respiratory even and unlabored with oxygen at 2PLM. No s/s of adverse reaction 
from ATB therapy. Skin is warm to touch, clean/dry, intact IV site. Keep lower position of 
bed with elevated HOB. Call light within reach, will continue to monitor.

## 2020-03-08 NOTE — NUR
MS/RN Closing note



patient in bed  comfortably, denies pain or any discomfort. Noticed PVC from cardiac strip, 
informed Dr. Malone. Skin is warm to touch, clean/dry, intact new IV sire. Respiratory 
even and unlabored with oxygen at 2PLM. Keep lower position of the bed with elevated HOB, 
Call light within reach, will endorse night shift.

## 2020-03-09 VITALS — DIASTOLIC BLOOD PRESSURE: 87 MMHG | SYSTOLIC BLOOD PRESSURE: 140 MMHG

## 2020-03-09 VITALS — SYSTOLIC BLOOD PRESSURE: 160 MMHG | DIASTOLIC BLOOD PRESSURE: 73 MMHG

## 2020-03-09 VITALS — SYSTOLIC BLOOD PRESSURE: 128 MMHG | DIASTOLIC BLOOD PRESSURE: 69 MMHG

## 2020-03-09 VITALS — DIASTOLIC BLOOD PRESSURE: 73 MMHG | SYSTOLIC BLOOD PRESSURE: 160 MMHG

## 2020-03-09 VITALS — DIASTOLIC BLOOD PRESSURE: 87 MMHG | SYSTOLIC BLOOD PRESSURE: 153 MMHG

## 2020-03-09 VITALS — DIASTOLIC BLOOD PRESSURE: 69 MMHG | SYSTOLIC BLOOD PRESSURE: 128 MMHG

## 2020-03-09 VITALS — SYSTOLIC BLOOD PRESSURE: 156 MMHG | DIASTOLIC BLOOD PRESSURE: 81 MMHG

## 2020-03-09 LAB
BASOPHILS # BLD AUTO: 0.1 /CMM (ref 0–0.2)
BASOPHILS NFR BLD AUTO: 0.5 % (ref 0–2)
BUN SERPL-MCNC: 28 MG/DL (ref 7–18)
CALCIUM SERPL-MCNC: 8.2 MG/DL (ref 8.5–10.1)
CHLORIDE SERPL-SCNC: 109 MMOL/L (ref 98–107)
CO2 SERPL-SCNC: 31 MMOL/L (ref 21–32)
CREAT SERPL-MCNC: 1 MG/DL (ref 0.6–1.3)
EOSINOPHIL NFR BLD AUTO: 0 % (ref 0–6)
GLUCOSE SERPL-MCNC: 130 MG/DL (ref 74–106)
HCT VFR BLD AUTO: 28 % (ref 39–51)
HGB BLD-MCNC: 8.9 G/DL (ref 13.5–17.5)
LYMPHOCYTES NFR BLD AUTO: 0.5 /CMM (ref 0.8–4.8)
LYMPHOCYTES NFR BLD AUTO: 3.5 % (ref 20–44)
MCHC RBC AUTO-ENTMCNC: 32 G/DL (ref 31–36)
MCV RBC AUTO: 90 FL (ref 80–96)
MONOCYTES NFR BLD AUTO: 1.2 /CMM (ref 0.1–1.3)
MONOCYTES NFR BLD AUTO: 8.2 % (ref 2–12)
NEUTROPHILS # BLD AUTO: 12.8 /CMM (ref 1.8–8.9)
NEUTROPHILS NFR BLD AUTO: 87.8 % (ref 43–81)
PLATELET # BLD AUTO: 191 /CMM (ref 150–450)
POTASSIUM SERPL-SCNC: 3.2 MMOL/L (ref 3.5–5.1)
RBC # BLD AUTO: 3.11 MIL/UL (ref 4.5–6)
SODIUM SERPL-SCNC: 148 MMOL/L (ref 136–145)
WBC NRBC COR # BLD AUTO: 14.6 K/UL (ref 4.3–11)

## 2020-03-09 RX ADMIN — PIPERACILLIN SODIUM AND TAZOBACTAM SODIUM SCH MLS/HR: .375; 3 INJECTION, POWDER, LYOPHILIZED, FOR SOLUTION INTRAVENOUS at 10:47

## 2020-03-09 RX ADMIN — PIPERACILLIN SODIUM AND TAZOBACTAM SODIUM SCH MLS/HR: .375; 3 INJECTION, POWDER, LYOPHILIZED, FOR SOLUTION INTRAVENOUS at 14:20

## 2020-03-09 RX ADMIN — MEMANTINE HYDROCHLORIDE SCH MG: 5 TABLET ORAL at 10:35

## 2020-03-09 RX ADMIN — MELOXICAM SCH MG: 7.5 TABLET ORAL at 21:08

## 2020-03-09 RX ADMIN — Medication SCH MG: at 12:40

## 2020-03-09 RX ADMIN — ACETYLCYSTEINE SCH MG: 100 INHALANT RESPIRATORY (INHALATION) at 14:40

## 2020-03-09 RX ADMIN — ALBUTEROL SULFATE SCH MG: 2.5 SOLUTION RESPIRATORY (INHALATION) at 08:20

## 2020-03-09 RX ADMIN — MELOXICAM SCH MG: 7.5 TABLET ORAL at 10:46

## 2020-03-09 RX ADMIN — DEXTROSE AND SODIUM CHLORIDE PRN MLS/HR: 5; 900 INJECTION, SOLUTION INTRAVENOUS at 14:16

## 2020-03-09 RX ADMIN — MEMANTINE HYDROCHLORIDE SCH MG: 5 TABLET ORAL at 18:03

## 2020-03-09 RX ADMIN — Medication SCH MG: at 19:53

## 2020-03-09 RX ADMIN — MONTELUKAST SODIUM SCH MG: 10 TABLET, FILM COATED ORAL at 18:02

## 2020-03-09 RX ADMIN — GABAPENTIN SCH MG: 300 CAPSULE ORAL at 10:35

## 2020-03-09 RX ADMIN — PIPERACILLIN SODIUM AND TAZOBACTAM SODIUM SCH MLS/HR: .375; 3 INJECTION, POWDER, LYOPHILIZED, FOR SOLUTION INTRAVENOUS at 21:08

## 2020-03-09 RX ADMIN — PANTOPRAZOLE SODIUM SCH MG: 40 TABLET, DELAYED RELEASE ORAL at 10:36

## 2020-03-09 RX ADMIN — ALBUTEROL SCH MG: 2 TABLET ORAL at 10:46

## 2020-03-09 RX ADMIN — ACETYLCYSTEINE SCH MG: 100 INHALANT RESPIRATORY (INHALATION) at 08:20

## 2020-03-09 RX ADMIN — ACETYLCYSTEINE SCH MG: 100 INHALANT RESPIRATORY (INHALATION) at 23:32

## 2020-03-09 RX ADMIN — ENOXAPARIN SODIUM SCH MG: 30 INJECTION SUBCUTANEOUS at 21:24

## 2020-03-09 RX ADMIN — PIPERACILLIN SODIUM AND TAZOBACTAM SODIUM SCH MLS/HR: .375; 3 INJECTION, POWDER, LYOPHILIZED, FOR SOLUTION INTRAVENOUS at 02:03

## 2020-03-09 RX ADMIN — MIRTAZAPINE SCH MG: 15 TABLET, FILM COATED ORAL at 21:08

## 2020-03-09 RX ADMIN — Medication SCH MG: at 08:20

## 2020-03-09 RX ADMIN — ALBUTEROL SCH MG: 2 TABLET ORAL at 18:07

## 2020-03-09 RX ADMIN — ALBUTEROL SULFATE SCH MG: 2.5 SOLUTION RESPIRATORY (INHALATION) at 12:40

## 2020-03-09 RX ADMIN — ALBUTEROL SULFATE SCH MG: 2.5 SOLUTION RESPIRATORY (INHALATION) at 19:54

## 2020-03-09 RX ADMIN — ESCITALOPRAM OXALATE SCH MG: 10 TABLET, FILM COATED ORAL at 10:36

## 2020-03-09 NOTE — NUR
TELE RN OPENING NOTES



RECEIVED PATIENT FROM MORNING SHIFT, ALERT AND ORIENTED X 3 Swazi SPEAKING. AGITATED AND 
WANTED TO GO TO THE RESTROOM TO HAVE A BOWEL MOVEMENT. ASSISTED BY CNA AMBULATORY WITH 
WALKER, HAD A LARGE SOFT BROWNISH STOOL. BREATHING REGULAR AND UNLABORED ON OXYGEN AT 3L/MIN 
VIA NASAL CANNULA. RIGHT FOREARM G20 IV LINE INTACT AND PATENT, INFUSING WELL WITH NO 
BLEEDING OR S/S OF INFILTRATION OBSERVED. BODY ASSESSMENT DONE, SKIN INTACT AND CLEAN. 
COMPLAINED OF STOMACH ACHE, MORNING SHIFT RN GAVE MAALOX PRN. CAME BACK TO BED WITH NO 
SOB/DISTRESS. BED LOW AND LOCKED ON HIGH FOWLERS POSITION, CALL LIGHT IN REACH. WILL 
CONTINUE TO MONITOR.

## 2020-03-09 NOTE — NUR
RN NOTES

 INFUSING ZOFRAN 100 ML/HR ON RIGHT FA INTACT. NEEDS ATTENDED  AND ANTICIPATED. CALL LIGHT 
WITHIN TO REACH. CONTINUED MONITORING.

## 2020-03-09 NOTE — NUR
rn notes

 patient in the bed, on o2-2lnc,  administered scheduled medication, v/s stable, patient 
using urinal, infusing D5NS at 100 ml/hr on right FA intact, call light within to reach. 
safety precaution maintained all the time.

## 2020-03-09 NOTE — NUR
RN NOTES

 RECEIVED PATIENT IN THE BED A/O X2/3 French SPEAKER  ON TELE SR 88 TO 90 PVC, PATIENT ON 
O2-2L NC,   NO ACUTE RESPIRATORY DISTRESS, V/S TAKEN, ADMINISTERED SCHEDULED  MEDICATION,  
PATIENT REFUSED PAIN, INFUSING D5NS  ML/HR ON RIGHT FA INTACT. PATIENT  TURN AND 
REPOSTION SELF IN THE BED. CALL LIGHT WITHIN TO REACH, SAFETY PRECAUTION MAINTAINED ALL THE 
TIME.

## 2020-03-09 NOTE — NUR
TELE RN NOTES



TRANSFERRED TO ROOM 308-2 WITH STABLE CONDITION. AWARE OF TRANSFER, STILL ON OXYGEN AT 
3L/MIN AND ATTACHED TO CARDIAC MONITOR WITH NSR WITH BBB AND PVC AT 92bpm. NO COMPLAINTS OF 
PAIN/DISCOMFORT REPORTED AT THIS TIME. WILL CONTINUE TO MONITOR.

## 2020-03-09 NOTE — NUR
MS RN CLOSING NOTE



PATIENT IN BED. A/OX 2- 3, PATIENT IS Sinhala SPEAKING, ABLE TO MAKE BASIC NEEDS KNOWN. ON 
OXYGEN 2L/MIN VIA NASAL CANNULA. RESPIRATIONS ARE EVEN AND UNLABORED. NO  SOB NOTED. NO C/O 
PAIN THROUGHOUT SHIFT. EXTERNAL TELE MONITOR READS SR WITH BBB WITH PVCS HR 95. NO DISTRESS 
NOTED. IV ACCESS MAINTAINED IN RFA#22 RUNNING D5NS@100ML/HR. BED IS LOW AND LOCKED, HOB 
ELEVATED IN HIGH FOWLERS, SIDE RAILS UP X2, BED ALARM ON. CALL LIGHT WITHIN REACH. WILL 
ENDORSE TO NEXT SHIFT

## 2020-03-10 VITALS — SYSTOLIC BLOOD PRESSURE: 150 MMHG | DIASTOLIC BLOOD PRESSURE: 88 MMHG

## 2020-03-10 VITALS — DIASTOLIC BLOOD PRESSURE: 85 MMHG | SYSTOLIC BLOOD PRESSURE: 136 MMHG

## 2020-03-10 VITALS — DIASTOLIC BLOOD PRESSURE: 98 MMHG | SYSTOLIC BLOOD PRESSURE: 158 MMHG

## 2020-03-10 VITALS — SYSTOLIC BLOOD PRESSURE: 155 MMHG | DIASTOLIC BLOOD PRESSURE: 90 MMHG

## 2020-03-10 VITALS — SYSTOLIC BLOOD PRESSURE: 128 MMHG | DIASTOLIC BLOOD PRESSURE: 68 MMHG

## 2020-03-10 VITALS — DIASTOLIC BLOOD PRESSURE: 48 MMHG | SYSTOLIC BLOOD PRESSURE: 143 MMHG

## 2020-03-10 RX ADMIN — ACETYLCYSTEINE SCH MG: 100 INHALANT RESPIRATORY (INHALATION) at 15:24

## 2020-03-10 RX ADMIN — MELOXICAM SCH MG: 7.5 TABLET ORAL at 22:04

## 2020-03-10 RX ADMIN — PANTOPRAZOLE SODIUM SCH MG: 40 TABLET, DELAYED RELEASE ORAL at 08:52

## 2020-03-10 RX ADMIN — Medication SCH MG: at 19:35

## 2020-03-10 RX ADMIN — DEXTROSE AND SODIUM CHLORIDE PRN MLS/HR: 5; 900 INJECTION, SOLUTION INTRAVENOUS at 15:38

## 2020-03-10 RX ADMIN — ALBUTEROL SCH MG: 2 TABLET ORAL at 09:00

## 2020-03-10 RX ADMIN — PIPERACILLIN SODIUM AND TAZOBACTAM SODIUM SCH MLS/HR: .375; 3 INJECTION, POWDER, LYOPHILIZED, FOR SOLUTION INTRAVENOUS at 22:04

## 2020-03-10 RX ADMIN — DEXTROSE AND SODIUM CHLORIDE PRN MLS/HR: 5; 900 INJECTION, SOLUTION INTRAVENOUS at 03:01

## 2020-03-10 RX ADMIN — ALBUTEROL SULFATE SCH MG: 2.5 SOLUTION RESPIRATORY (INHALATION) at 07:32

## 2020-03-10 RX ADMIN — MEMANTINE HYDROCHLORIDE SCH MG: 5 TABLET ORAL at 08:52

## 2020-03-10 RX ADMIN — ALBUTEROL SCH MG: 2 TABLET ORAL at 17:26

## 2020-03-10 RX ADMIN — ALBUTEROL SULFATE SCH MG: 2.5 SOLUTION RESPIRATORY (INHALATION) at 13:53

## 2020-03-10 RX ADMIN — MEMANTINE HYDROCHLORIDE SCH MG: 5 TABLET ORAL at 17:26

## 2020-03-10 RX ADMIN — PIPERACILLIN SODIUM AND TAZOBACTAM SODIUM SCH MLS/HR: .375; 3 INJECTION, POWDER, LYOPHILIZED, FOR SOLUTION INTRAVENOUS at 02:55

## 2020-03-10 RX ADMIN — ACETYLCYSTEINE SCH MG: 100 INHALANT RESPIRATORY (INHALATION) at 07:32

## 2020-03-10 RX ADMIN — VALSARTAN SCH MG: 80 TABLET ORAL at 10:41

## 2020-03-10 RX ADMIN — ALBUTEROL SULFATE SCH MG: 2.5 SOLUTION RESPIRATORY (INHALATION) at 19:36

## 2020-03-10 RX ADMIN — ENOXAPARIN SODIUM SCH MG: 30 INJECTION SUBCUTANEOUS at 22:06

## 2020-03-10 RX ADMIN — ALBUTEROL SULFATE PRN MG: 2.5 SOLUTION RESPIRATORY (INHALATION) at 23:25

## 2020-03-10 RX ADMIN — PIPERACILLIN SODIUM AND TAZOBACTAM SODIUM SCH MLS/HR: .375; 3 INJECTION, POWDER, LYOPHILIZED, FOR SOLUTION INTRAVENOUS at 09:38

## 2020-03-10 RX ADMIN — MELOXICAM SCH MG: 7.5 TABLET ORAL at 09:01

## 2020-03-10 RX ADMIN — GABAPENTIN SCH MG: 300 CAPSULE ORAL at 08:51

## 2020-03-10 RX ADMIN — Medication SCH MG: at 07:32

## 2020-03-10 RX ADMIN — MONTELUKAST SODIUM SCH MG: 10 TABLET, FILM COATED ORAL at 17:26

## 2020-03-10 RX ADMIN — ESCITALOPRAM OXALATE SCH MG: 10 TABLET, FILM COATED ORAL at 08:52

## 2020-03-10 RX ADMIN — PIPERACILLIN SODIUM AND TAZOBACTAM SODIUM SCH MLS/HR: .375; 3 INJECTION, POWDER, LYOPHILIZED, FOR SOLUTION INTRAVENOUS at 15:33

## 2020-03-10 RX ADMIN — Medication SCH MG: at 13:53

## 2020-03-10 RX ADMIN — ACETYLCYSTEINE SCH MG: 100 INHALANT RESPIRATORY (INHALATION) at 23:24

## 2020-03-10 RX ADMIN — MIRTAZAPINE SCH MG: 15 TABLET, FILM COATED ORAL at 22:04

## 2020-03-10 NOTE — NUR
TELE/RN OPENING NOTES

RECEIVED PATIENT IN BED AWAKE, ALERT X2, RESPIRATIONS EVEN AND UNLABORED, ON TELE MONITOR AT 
. SKIN WARM TO TOUCH, BED LOCKED, CALL LIGHTS WITHIN REACH. RECEIVED ENDORSEMENT FROM 
AM RN FOR TREVOR. PATIENT TO MONITOR FOR ANY CHANGES. DISCUSSED IMPORTANCE OF SAFETY AND USE OF 
CALL LIGHTS. DENIES ANY PAIN. ON D5NS AT 100ML/HR.

## 2020-03-10 NOTE — NUR
TELE RN OPENING NOTES



PATIENT IN BED ALERT AND ORIENTED X 3. VERBALLY RESPONSIVE AND ABLE TO FOLLOW DIRECTIONS. 
BREATHING REGULAR AND UNLABORED ON OXYGEN AT 3L/MIN VIA NASAL CANNULA. O2 SAT 95%.ON TELE SR 
HR 82. RIGHT FOREARM G20 IV LINE PATENT AND INFUSING WELL. NO COMPLAINTS OF PAIN/DISCOMFORT 
.BED LOW AND LOCKED ON SEMI FOWLERS POSITION, CALL LIGHT WITHIN REACH.

## 2020-03-10 NOTE — NUR
TELE RN CLOSING NOTES



PATIENT IN BED ALERT AND ORIENTED X 3. VERBALLY RESPONSIVE AND ABLE TO FOLLOW DIRECTIONS. 
BREATHING REGULAR AND UNLABORED ON OXYGEN AT 3L/MIN VIA NASAL CANNULA. RIGHT FOREARM G20 IV 
LINE PATENT AND INFUSING WELL. NO COMPLAINTS OF PAIN/DISCOMFORT REPORTED THE WHOLE SHIFT. 
BED LOW AND LOCKED ON SEMI FOWLERS POSITION, CALL LIGHT IN REACH. WILL ENDORSE TO MORNING 
SHIFT FOR TREVOR.

## 2020-03-10 NOTE — NUR
TELE/RN OPENING NOTES

RECEIVED PATIENT IN BED, AWAKE, ALERT X3, WITH CAREGIVER, ON OXYGEN VIA NC AT 3LITER, HAVING 
LATE DINNER, ABLE TO TOLERATE PO MEDS. RESPIRATIONS EVEN AND UNLABORED, SKIN WARM TO TOUCH, 
WITH SOME CHEST PRESSURE BUT REPOSITIONED AND ENCOURAGE BREATHING, ABLE TO FEEL SOME 
IMPROVEMENT. WILL HAVE BREATHING TX . TELE MONITOR ST . RECEIVED ENDORSEMENT FROM AM 
RN FOR TREVOR. 

-------------------------------------------------------------------------------

Addendum: 03/10/20 at 2001 by LARA VILLASEÑOR RN

-------------------------------------------------------------------------------

PLS DISREGARD FOR ANOTHER  PATIENT

## 2020-03-10 NOTE — NUR
TELE/RN NOTES; RFA GAUGE 22 WITH NO S/S OF INFILTRATION, KRAUSE CATETHER DRAINING URINE 
YELLOW COLOR. D5 RUNNING AT 100CC/HR/WILL MONITOR. BED LOCKED, CALL LIGHTS WITHIN REACH.

## 2020-03-10 NOTE — NUR
PT RESTING IN BED DENYING ANY PAIN OR DISTRESS.WITH O2 AT 3L/MIN VIA NC.WITH ONGOING IVF OF 
D5NS AT 100ML/HR INFUSING WELL.CALL LIGHT PLACED WITHIN REACH.

## 2020-03-11 VITALS — SYSTOLIC BLOOD PRESSURE: 112 MMHG | DIASTOLIC BLOOD PRESSURE: 71 MMHG

## 2020-03-11 VITALS — DIASTOLIC BLOOD PRESSURE: 67 MMHG | SYSTOLIC BLOOD PRESSURE: 151 MMHG

## 2020-03-11 VITALS — SYSTOLIC BLOOD PRESSURE: 138 MMHG | DIASTOLIC BLOOD PRESSURE: 77 MMHG

## 2020-03-11 VITALS — SYSTOLIC BLOOD PRESSURE: 130 MMHG | DIASTOLIC BLOOD PRESSURE: 78 MMHG

## 2020-03-11 VITALS — DIASTOLIC BLOOD PRESSURE: 71 MMHG | SYSTOLIC BLOOD PRESSURE: 112 MMHG

## 2020-03-11 VITALS — SYSTOLIC BLOOD PRESSURE: 150 MMHG | DIASTOLIC BLOOD PRESSURE: 68 MMHG

## 2020-03-11 LAB
ALBUMIN SERPL BCP-MCNC: 1.7 G/DL (ref 3.4–5)
ALP SERPL-CCNC: 54 U/L (ref 46–116)
ALT SERPL W P-5'-P-CCNC: 61 U/L (ref 12–78)
AST SERPL W P-5'-P-CCNC: 22 U/L (ref 15–37)
BILIRUB SERPL-MCNC: 0.2 MG/DL (ref 0.2–1)
BUN SERPL-MCNC: 21 MG/DL (ref 7–18)
CALCIUM SERPL-MCNC: 7.6 MG/DL (ref 8.5–10.1)
CHLORIDE SERPL-SCNC: 108 MMOL/L (ref 98–107)
CO2 SERPL-SCNC: 30 MMOL/L (ref 21–32)
CREAT SERPL-MCNC: 0.9 MG/DL (ref 0.6–1.3)
GLUCOSE SERPL-MCNC: 129 MG/DL (ref 74–106)
POTASSIUM SERPL-SCNC: 2.9 MMOL/L (ref 3.5–5.1)
PROT SERPL-MCNC: 4.8 G/DL (ref 6.4–8.2)
SODIUM SERPL-SCNC: 146 MMOL/L (ref 136–145)

## 2020-03-11 RX ADMIN — MONTELUKAST SODIUM SCH MG: 10 TABLET, FILM COATED ORAL at 17:27

## 2020-03-11 RX ADMIN — MEMANTINE HYDROCHLORIDE SCH MG: 5 TABLET ORAL at 17:27

## 2020-03-11 RX ADMIN — ACETYLCYSTEINE SCH MG: 100 INHALANT RESPIRATORY (INHALATION) at 14:18

## 2020-03-11 RX ADMIN — POTASSIUM CHLORIDE SCH MEQ: 1500 TABLET, EXTENDED RELEASE ORAL at 09:33

## 2020-03-11 RX ADMIN — MELOXICAM SCH MG: 7.5 TABLET ORAL at 09:33

## 2020-03-11 RX ADMIN — ALBUTEROL SCH MG: 2 TABLET ORAL at 09:33

## 2020-03-11 RX ADMIN — ESCITALOPRAM OXALATE SCH MG: 10 TABLET, FILM COATED ORAL at 09:32

## 2020-03-11 RX ADMIN — VALSARTAN SCH MG: 80 TABLET ORAL at 09:32

## 2020-03-11 RX ADMIN — POTASSIUM CHLORIDE SCH MEQ: 1500 TABLET, EXTENDED RELEASE ORAL at 10:00

## 2020-03-11 RX ADMIN — ALBUTEROL SULFATE SCH MG: 2.5 SOLUTION RESPIRATORY (INHALATION) at 14:17

## 2020-03-11 RX ADMIN — ALBUTEROL SULFATE SCH MG: 2.5 SOLUTION RESPIRATORY (INHALATION) at 07:51

## 2020-03-11 RX ADMIN — MEMANTINE HYDROCHLORIDE SCH MG: 5 TABLET ORAL at 09:33

## 2020-03-11 RX ADMIN — POTASSIUM CHLORIDE SCH MEQ: 1500 TABLET, EXTENDED RELEASE ORAL at 10:55

## 2020-03-11 RX ADMIN — Medication SCH MG: at 20:24

## 2020-03-11 RX ADMIN — ACETYLCYSTEINE SCH MG: 100 INHALANT RESPIRATORY (INHALATION) at 23:30

## 2020-03-11 RX ADMIN — ALBUTEROL SULFATE SCH MG: 2.5 SOLUTION RESPIRATORY (INHALATION) at 20:24

## 2020-03-11 RX ADMIN — PANTOPRAZOLE SODIUM SCH MG: 40 TABLET, DELAYED RELEASE ORAL at 09:33

## 2020-03-11 RX ADMIN — MELOXICAM SCH MG: 7.5 TABLET ORAL at 21:45

## 2020-03-11 RX ADMIN — ACETYLCYSTEINE SCH MG: 100 INHALANT RESPIRATORY (INHALATION) at 07:51

## 2020-03-11 RX ADMIN — Medication SCH MG: at 14:17

## 2020-03-11 RX ADMIN — DEXTROSE AND SODIUM CHLORIDE PRN MLS/HR: 5; 900 INJECTION, SOLUTION INTRAVENOUS at 09:46

## 2020-03-11 RX ADMIN — Medication SCH MG: at 07:51

## 2020-03-11 RX ADMIN — MIRTAZAPINE SCH MG: 15 TABLET, FILM COATED ORAL at 21:45

## 2020-03-11 RX ADMIN — PIPERACILLIN SODIUM AND TAZOBACTAM SODIUM SCH MLS/HR: .375; 3 INJECTION, POWDER, LYOPHILIZED, FOR SOLUTION INTRAVENOUS at 21:45

## 2020-03-11 RX ADMIN — PIPERACILLIN SODIUM AND TAZOBACTAM SODIUM SCH MLS/HR: .375; 3 INJECTION, POWDER, LYOPHILIZED, FOR SOLUTION INTRAVENOUS at 09:33

## 2020-03-11 RX ADMIN — ALBUTEROL SCH MG: 2 TABLET ORAL at 17:27

## 2020-03-11 RX ADMIN — PIPERACILLIN SODIUM AND TAZOBACTAM SODIUM SCH MLS/HR: .375; 3 INJECTION, POWDER, LYOPHILIZED, FOR SOLUTION INTRAVENOUS at 03:37

## 2020-03-11 RX ADMIN — PIPERACILLIN SODIUM AND TAZOBACTAM SODIUM SCH MLS/HR: .375; 3 INJECTION, POWDER, LYOPHILIZED, FOR SOLUTION INTRAVENOUS at 14:11

## 2020-03-11 RX ADMIN — GABAPENTIN SCH MG: 300 CAPSULE ORAL at 09:33

## 2020-03-11 NOTE — NUR
TELE RN OPENING NOTES



PATIENT IN BED ALERT AND ORIENTED X 3. VERBALLY RESPONSIVE AND ABLE TO FOLLOW DIRECTIONS. 
BREATHING REGULAR AND UNLABORED ON OXYGEN AT 3L/MIN VIA NASAL CANNULA. O2 SAT 95%.ON TELE SR 
HR 76 WITH PVCS AND PT'S HR GOES 170 TACHY WHEN GOING TO THE TOILET WITH ASSIST USING 
FWW-WITH REST IN BETWEEN TAKING HIS TIME TO EASE HIS BREATHING. DR MATUTE AWARE.RIGHT 
FOREARM G20 IV LINE PATENT AND INFUSING WELL. NO COMPLAINTS OF PAIN/DISCOMFORT .BED LOW AND 
LOCKED ON SEMI FOWLERS POSITION, CALL LIGHT WITHIN REACH.

## 2020-03-11 NOTE — NUR
MS RN NOTES



RECEIVED PT IN BED AWAKE AND ABLE TO MAKE NEEDS KNOWN.  PT A/O X3 AND Romanian SPEAKING.  
RESPIRATIONS EVEN AND UNLABORED WITH  NO S/S OF ACUTE DISTRESS OR SOB NOTED.   NO COMPLAINTS 
OF PAIN AT THIS TIME.  PT WITH SAROJ #20G PATENT AND INTACT INFUSING D5NS @100CC/HR.  SAFETY 
MEASURES IN PLACE WITH BED IN LOWEST LOCKED POSITION WITH SIDE RAILS UP X2.  CALL LIGHT 
WITHIN REACH.  WILL CONTINUE TO MONITOR.

## 2020-03-11 NOTE — NUR
[id number]/RN CLOSING NOTES

PATIENT SLEPT DURING THE NIGHT, RESPIRATIONS EVEN AND UNLABORED, ATTENDED

ALL NEEDS, KEPT COMFORTABLE, TOLERATED IV ANTIBIOTIC WITH NO S/S OF ADVERSE REACTION. 

BED LOCKED, CALL LIGHTS WITHIN REACH. MONITORED FOR ANY CHANGES. 

WILL ENDORSE TO AM RN FOR TREVOR.

## 2020-03-12 VITALS — SYSTOLIC BLOOD PRESSURE: 149 MMHG | DIASTOLIC BLOOD PRESSURE: 93 MMHG

## 2020-03-12 VITALS — SYSTOLIC BLOOD PRESSURE: 142 MMHG | DIASTOLIC BLOOD PRESSURE: 89 MMHG

## 2020-03-12 RX ADMIN — MELOXICAM SCH MG: 7.5 TABLET ORAL at 09:06

## 2020-03-12 RX ADMIN — Medication SCH MG: at 20:24

## 2020-03-12 RX ADMIN — VALSARTAN SCH MG: 80 TABLET ORAL at 09:01

## 2020-03-12 RX ADMIN — DEXTROSE AND SODIUM CHLORIDE PRN MLS/HR: 5; 900 INJECTION, SOLUTION INTRAVENOUS at 04:05

## 2020-03-12 RX ADMIN — PIPERACILLIN SODIUM AND TAZOBACTAM SODIUM SCH MLS/HR: .375; 3 INJECTION, POWDER, LYOPHILIZED, FOR SOLUTION INTRAVENOUS at 15:05

## 2020-03-12 RX ADMIN — Medication SCH MG: at 07:18

## 2020-03-12 RX ADMIN — POTASSIUM CHLORIDE SCH MEQ: 1500 TABLET, EXTENDED RELEASE ORAL at 11:00

## 2020-03-12 RX ADMIN — Medication SCH MG: at 13:31

## 2020-03-12 RX ADMIN — POTASSIUM CHLORIDE SCH MEQ: 1500 TABLET, EXTENDED RELEASE ORAL at 12:12

## 2020-03-12 RX ADMIN — MEMANTINE HYDROCHLORIDE SCH MG: 5 TABLET ORAL at 16:40

## 2020-03-12 RX ADMIN — MONTELUKAST SODIUM SCH MG: 10 TABLET, FILM COATED ORAL at 18:02

## 2020-03-12 RX ADMIN — POTASSIUM CHLORIDE SCH MEQ: 1500 TABLET, EXTENDED RELEASE ORAL at 13:17

## 2020-03-12 RX ADMIN — ALBUTEROL SULFATE SCH MG: 2.5 SOLUTION RESPIRATORY (INHALATION) at 07:18

## 2020-03-12 RX ADMIN — ALBUTEROL SULFATE SCH MG: 2.5 SOLUTION RESPIRATORY (INHALATION) at 16:16

## 2020-03-12 RX ADMIN — ALBUTEROL SCH MG: 2 TABLET ORAL at 16:40

## 2020-03-12 RX ADMIN — ACETYLCYSTEINE SCH MG: 100 INHALANT RESPIRATORY (INHALATION) at 16:15

## 2020-03-12 RX ADMIN — ACETYLCYSTEINE SCH MG: 100 INHALANT RESPIRATORY (INHALATION) at 07:18

## 2020-03-12 RX ADMIN — GABAPENTIN SCH MG: 300 CAPSULE ORAL at 09:01

## 2020-03-12 RX ADMIN — ESCITALOPRAM OXALATE SCH MG: 10 TABLET, FILM COATED ORAL at 09:01

## 2020-03-12 RX ADMIN — ALBUTEROL SCH MG: 2 TABLET ORAL at 09:10

## 2020-03-12 RX ADMIN — ALBUTEROL SULFATE SCH MG: 2.5 SOLUTION RESPIRATORY (INHALATION) at 13:31

## 2020-03-12 RX ADMIN — PANTOPRAZOLE SODIUM SCH MG: 40 TABLET, DELAYED RELEASE ORAL at 09:08

## 2020-03-12 RX ADMIN — POTASSIUM CHLORIDE SCH MEQ: 1500 TABLET, EXTENDED RELEASE ORAL at 09:24

## 2020-03-12 RX ADMIN — PIPERACILLIN SODIUM AND TAZOBACTAM SODIUM SCH MLS/HR: .375; 3 INJECTION, POWDER, LYOPHILIZED, FOR SOLUTION INTRAVENOUS at 03:07

## 2020-03-12 RX ADMIN — MEMANTINE HYDROCHLORIDE SCH MG: 5 TABLET ORAL at 09:01

## 2020-03-12 RX ADMIN — PIPERACILLIN SODIUM AND TAZOBACTAM SODIUM SCH MLS/HR: .375; 3 INJECTION, POWDER, LYOPHILIZED, FOR SOLUTION INTRAVENOUS at 09:06

## 2020-03-12 NOTE — NUR
MS RN NOTES



RECEIVED REPORT FROM NIGHT NURSE. PATIENT IS LAYING IN BED. BED IS IN LOWEST LOCKED POSITION 
WITH SIDE RAILS UP X2, SEMI FOWLERS. APPEARS TO BE COMFORTABLE/ NO COMPLAINTS OF PAIN AT 
THIS TIME. NO SIGNS OF SOB/ RESPIRATORY DISTRESS NOTED. WILL CONTINUE TO MONITOR.

## 2020-03-12 NOTE — NUR
MS RN CLOSING NOTES



PATIENT IS LAYING IN BED WATCHING TV. PATIENT IS ON 3L NASAL CANULA, NO SOB/ ACUTE 
RESPIRATORY DISTRESS NOTED. IV SITE IS PATENT AND INTACT, RUNNING D5NS  MLS/HR. CALL 
LIGHT IS WITHIN REACH. BED IS IN LOWEST LOCKED POSITION WITH SIDE RAILS UP, SEMI FOWLERS. 
CALL LIGHT IS WITHIN REACH. WILL ENDORSE REPORT TO NIGHT SHIFT.

## 2020-03-12 NOTE — NUR
MS RN NOTES



PT IN BED AWAKE AND ABLE TO MAKE NEEDS KNOWN.  PT A/O X3 AND Iranian SPEAKING.  
RESPIRATIONS EVEN AND UNLABORED WITH  NO S/S OF ACUTE DISTRESS OR SOB NOTED THROUGHOUT 
SHIFT.   NO COMPLAINTS OF PAIN AT THIS TIME.  PT WITH SAROJ #20G PATENT AND INTACT INFUSING 
D5NS @100CC/HR.  PT KEPT CLEAN, DRY, AND COMFORTABLE.  SAFETY MEASURES IN PLACE WITH BED IN 
LOWEST LOCKED POSITION WITH SIDE RAILS UP X2.  CALL LIGHT WITHIN REACH.  WILL ENDORSE TO 
ONCOMING NURSE FOR TREVOR.

## 2020-03-12 NOTE — NUR
MS RN NOTES



RECEIVED PT IN BED AWAKE AND ABLE TO MAKE NEEDS KNOWN.  PT A/O X3 AND Guatemalan SPEAKING.  
RESPIRATIONS EVEN AND UNLABORED WITH  NO S/S OF ACUTE DISTRESS OR SOB NOTED.   NO COMPLAINTS 
OF PAIN AT THIS TIME.  PT WITH SAROJ #20G PATENT AND INTACT INFUSING D5NS @100CC/HR.  PT 
AWAITING DISCHARGE TO 4 SEASONS.  SAFETY MEASURES IN PLACE WITH BED IN LOWEST LOCKED 
POSITION WITH SIDE RAILS UP X2.  CALL LIGHT WITHIN REACH.  WILL CONTINUE TO MONITOR.

## 2020-03-12 NOTE — NUR
TRIED TO PULL OUT K+ 20 MEQ TAB TO THE OMNICELL (CLOSE TO THE NURSING STATION) BUT THE BIN 
WAS EMPTY AND FORGOT TO CANCEL IT IN THE OMNICELL.NOTIFIED PHARMACY.OBTAIN k+20 MEQ TO THE 
OMNICELL(BY THE HALLWAY).

## 2020-03-12 NOTE — NUR
MS RN NOTES



RECEIVED PHONE CALL FROM JEAN (CASE MANAGEMENT) STATING THAT PATIENT WILL BE DISCHARGED TO 4 
[facility]AS[facility] AT 2045 TONIGHT.

## 2020-03-12 NOTE — NUR
MS RN DISCHARGE NOTES



PT DISCHARGED FROM UNIT TO 4 SEAS[facility] VIA AMBULANCE BY PAULIE.  PT IN STABLE CONDITION WITH 
VSS A/O X3.  PT IV REMOVED WITH TIP INTACT.  NO COMPLAINTS OF PAIN.  RESPIRATIONS EVEN AND 
UNLABORED WITH NO S/S OF ACUTE DISTRESS OR SOB NOTED.  DISCHARGE INSTRUCTIONS WENT OVER WITH 
PT AND FACILITY AND BOTH VERBALIZED UNDERSTANDING.  BELONGINGS LIST WENT OVER WITH PT, AND 
PT DENIED MISSING ITEMS.

## 2020-03-23 ENCOUNTER — HOSPITAL ENCOUNTER (INPATIENT)
Dept: HOSPITAL 54 - ER | Age: 85
LOS: 13 days | DRG: 871 | End: 2020-04-05
Attending: NURSE PRACTITIONER | Admitting: LEGAL MEDICINE
Payer: MEDICARE

## 2020-03-23 VITALS — HEIGHT: 66 IN | BODY MASS INDEX: 22.66 KG/M2 | WEIGHT: 141 LBS

## 2020-03-23 DIAGNOSIS — N40.0: ICD-10-CM

## 2020-03-23 DIAGNOSIS — A41.9: Primary | ICD-10-CM

## 2020-03-23 DIAGNOSIS — Z87.01: ICD-10-CM

## 2020-03-23 DIAGNOSIS — G93.41: ICD-10-CM

## 2020-03-23 DIAGNOSIS — J98.11: ICD-10-CM

## 2020-03-23 DIAGNOSIS — J96.21: ICD-10-CM

## 2020-03-23 DIAGNOSIS — R65.21: ICD-10-CM

## 2020-03-23 DIAGNOSIS — J96.01: ICD-10-CM

## 2020-03-23 DIAGNOSIS — I11.0: ICD-10-CM

## 2020-03-23 DIAGNOSIS — I48.0: ICD-10-CM

## 2020-03-23 DIAGNOSIS — Z79.899: ICD-10-CM

## 2020-03-23 DIAGNOSIS — E43: ICD-10-CM

## 2020-03-23 DIAGNOSIS — J44.1: ICD-10-CM

## 2020-03-23 DIAGNOSIS — I50.9: ICD-10-CM

## 2020-03-23 DIAGNOSIS — I21.A1: ICD-10-CM

## 2020-03-23 DIAGNOSIS — Z66: ICD-10-CM

## 2020-03-23 DIAGNOSIS — N17.0: ICD-10-CM

## 2020-03-23 DIAGNOSIS — E87.2: ICD-10-CM

## 2020-03-23 DIAGNOSIS — J18.9: ICD-10-CM

## 2020-03-23 DIAGNOSIS — F09: ICD-10-CM

## 2020-03-23 DIAGNOSIS — J44.0: ICD-10-CM

## 2020-03-23 DIAGNOSIS — D69.6: ICD-10-CM

## 2020-03-23 DIAGNOSIS — M19.90: ICD-10-CM

## 2020-03-23 DIAGNOSIS — E86.1: ICD-10-CM

## 2020-03-23 DIAGNOSIS — E87.0: ICD-10-CM

## 2020-03-23 DIAGNOSIS — F03.90: ICD-10-CM

## 2020-03-23 DIAGNOSIS — E87.5: ICD-10-CM

## 2020-03-23 DIAGNOSIS — K59.00: ICD-10-CM

## 2020-03-23 DIAGNOSIS — E27.40: ICD-10-CM

## 2020-03-23 DIAGNOSIS — K21.9: ICD-10-CM

## 2020-03-23 DIAGNOSIS — J96.22: ICD-10-CM

## 2020-03-23 DIAGNOSIS — N28.1: ICD-10-CM

## 2020-03-23 DIAGNOSIS — G62.9: ICD-10-CM

## 2020-03-23 DIAGNOSIS — Z51.5: ICD-10-CM

## 2020-03-23 DIAGNOSIS — Z22.322: ICD-10-CM

## 2020-03-23 DIAGNOSIS — I70.0: ICD-10-CM

## 2020-03-23 DIAGNOSIS — D64.9: ICD-10-CM

## 2020-03-23 PROCEDURE — A4216 STERILE WATER/SALINE, 10 ML: HCPCS

## 2020-03-23 PROCEDURE — G0378 HOSPITAL OBSERVATION PER HR: HCPCS

## 2020-03-23 PROCEDURE — U0002 COVID-19 LAB TEST NON-CDC: HCPCS

## 2020-03-23 PROCEDURE — A4624 TRACHEAL SUCTION TUBE: HCPCS

## 2020-03-23 PROCEDURE — C9113 INJ PANTOPRAZOLE SODIUM, VIA: HCPCS

## 2020-03-23 PROCEDURE — C1751 CATH, INF, PER/CENT/MIDLINE: HCPCS

## 2020-03-23 NOTE — NUR
FANNY FROM [facility] C/O SOB 30MINS PTA, 85% ON RA, PT IS ON RESPIRATORY 
DISTRESS, HOOKED TO CARDIAC MONITOR AND 02 VIA NON REBREATHER, RT AT BEDSIDE 
AND ER MD. WILL CONTINUE TO MONITOR.

## 2020-03-23 NOTE — NUR
RT



pt was brought in by ems for respiratory distress. pt was intubated in er bed 5 with no 
complications. 7.5 @24 @ lip. pt on following settings: AC 16 500 50% +5. minimal thin 
secretions suctioned. pt rashmi current setting. 



ABG in 1 hr



will continue to monitor.

## 2020-03-24 VITALS — SYSTOLIC BLOOD PRESSURE: 118 MMHG | DIASTOLIC BLOOD PRESSURE: 64 MMHG

## 2020-03-24 VITALS — SYSTOLIC BLOOD PRESSURE: 112 MMHG | DIASTOLIC BLOOD PRESSURE: 60 MMHG

## 2020-03-24 VITALS — DIASTOLIC BLOOD PRESSURE: 70 MMHG | SYSTOLIC BLOOD PRESSURE: 120 MMHG

## 2020-03-24 VITALS — DIASTOLIC BLOOD PRESSURE: 59 MMHG | SYSTOLIC BLOOD PRESSURE: 98 MMHG

## 2020-03-24 VITALS — DIASTOLIC BLOOD PRESSURE: 69 MMHG | SYSTOLIC BLOOD PRESSURE: 109 MMHG

## 2020-03-24 VITALS — SYSTOLIC BLOOD PRESSURE: 61 MMHG | DIASTOLIC BLOOD PRESSURE: 42 MMHG

## 2020-03-24 VITALS — SYSTOLIC BLOOD PRESSURE: 101 MMHG | DIASTOLIC BLOOD PRESSURE: 61 MMHG

## 2020-03-24 VITALS — DIASTOLIC BLOOD PRESSURE: 69 MMHG | SYSTOLIC BLOOD PRESSURE: 119 MMHG

## 2020-03-24 VITALS — SYSTOLIC BLOOD PRESSURE: 107 MMHG | DIASTOLIC BLOOD PRESSURE: 60 MMHG

## 2020-03-24 VITALS — DIASTOLIC BLOOD PRESSURE: 57 MMHG | SYSTOLIC BLOOD PRESSURE: 101 MMHG

## 2020-03-24 VITALS — DIASTOLIC BLOOD PRESSURE: 64 MMHG | SYSTOLIC BLOOD PRESSURE: 112 MMHG

## 2020-03-24 VITALS — DIASTOLIC BLOOD PRESSURE: 60 MMHG | SYSTOLIC BLOOD PRESSURE: 102 MMHG

## 2020-03-24 VITALS — DIASTOLIC BLOOD PRESSURE: 61 MMHG | SYSTOLIC BLOOD PRESSURE: 74 MMHG

## 2020-03-24 VITALS — SYSTOLIC BLOOD PRESSURE: 55 MMHG | DIASTOLIC BLOOD PRESSURE: 23 MMHG

## 2020-03-24 VITALS — DIASTOLIC BLOOD PRESSURE: 62 MMHG | SYSTOLIC BLOOD PRESSURE: 100 MMHG

## 2020-03-24 VITALS — DIASTOLIC BLOOD PRESSURE: 62 MMHG | SYSTOLIC BLOOD PRESSURE: 106 MMHG

## 2020-03-24 VITALS — DIASTOLIC BLOOD PRESSURE: 43 MMHG | SYSTOLIC BLOOD PRESSURE: 57 MMHG

## 2020-03-24 VITALS — SYSTOLIC BLOOD PRESSURE: 123 MMHG | DIASTOLIC BLOOD PRESSURE: 73 MMHG

## 2020-03-24 VITALS — SYSTOLIC BLOOD PRESSURE: 98 MMHG | DIASTOLIC BLOOD PRESSURE: 67 MMHG

## 2020-03-24 VITALS — SYSTOLIC BLOOD PRESSURE: 112 MMHG | DIASTOLIC BLOOD PRESSURE: 63 MMHG

## 2020-03-24 VITALS — SYSTOLIC BLOOD PRESSURE: 137 MMHG | DIASTOLIC BLOOD PRESSURE: 76 MMHG

## 2020-03-24 VITALS — DIASTOLIC BLOOD PRESSURE: 52 MMHG | SYSTOLIC BLOOD PRESSURE: 115 MMHG

## 2020-03-24 VITALS — SYSTOLIC BLOOD PRESSURE: 100 MMHG | DIASTOLIC BLOOD PRESSURE: 64 MMHG

## 2020-03-24 VITALS — SYSTOLIC BLOOD PRESSURE: 108 MMHG | DIASTOLIC BLOOD PRESSURE: 59 MMHG

## 2020-03-24 VITALS — SYSTOLIC BLOOD PRESSURE: 90 MMHG | DIASTOLIC BLOOD PRESSURE: 62 MMHG

## 2020-03-24 VITALS — DIASTOLIC BLOOD PRESSURE: 63 MMHG | SYSTOLIC BLOOD PRESSURE: 113 MMHG

## 2020-03-24 VITALS — DIASTOLIC BLOOD PRESSURE: 67 MMHG | SYSTOLIC BLOOD PRESSURE: 116 MMHG

## 2020-03-24 VITALS — DIASTOLIC BLOOD PRESSURE: 63 MMHG | SYSTOLIC BLOOD PRESSURE: 92 MMHG

## 2020-03-24 VITALS — DIASTOLIC BLOOD PRESSURE: 46 MMHG | SYSTOLIC BLOOD PRESSURE: 75 MMHG

## 2020-03-24 VITALS — SYSTOLIC BLOOD PRESSURE: 92 MMHG | DIASTOLIC BLOOD PRESSURE: 64 MMHG

## 2020-03-24 VITALS — SYSTOLIC BLOOD PRESSURE: 110 MMHG | DIASTOLIC BLOOD PRESSURE: 66 MMHG

## 2020-03-24 VITALS — DIASTOLIC BLOOD PRESSURE: 70 MMHG | SYSTOLIC BLOOD PRESSURE: 121 MMHG

## 2020-03-24 VITALS — SYSTOLIC BLOOD PRESSURE: 98 MMHG | DIASTOLIC BLOOD PRESSURE: 60 MMHG

## 2020-03-24 VITALS — DIASTOLIC BLOOD PRESSURE: 70 MMHG | SYSTOLIC BLOOD PRESSURE: 116 MMHG

## 2020-03-24 VITALS — DIASTOLIC BLOOD PRESSURE: 65 MMHG | SYSTOLIC BLOOD PRESSURE: 107 MMHG

## 2020-03-24 VITALS — DIASTOLIC BLOOD PRESSURE: 44 MMHG | SYSTOLIC BLOOD PRESSURE: 78 MMHG

## 2020-03-24 VITALS — SYSTOLIC BLOOD PRESSURE: 119 MMHG | DIASTOLIC BLOOD PRESSURE: 66 MMHG

## 2020-03-24 VITALS — DIASTOLIC BLOOD PRESSURE: 59 MMHG | SYSTOLIC BLOOD PRESSURE: 96 MMHG

## 2020-03-24 VITALS — DIASTOLIC BLOOD PRESSURE: 63 MMHG | SYSTOLIC BLOOD PRESSURE: 101 MMHG

## 2020-03-24 VITALS — DIASTOLIC BLOOD PRESSURE: 57 MMHG | SYSTOLIC BLOOD PRESSURE: 114 MMHG

## 2020-03-24 VITALS — DIASTOLIC BLOOD PRESSURE: 70 MMHG | SYSTOLIC BLOOD PRESSURE: 105 MMHG

## 2020-03-24 VITALS — DIASTOLIC BLOOD PRESSURE: 37 MMHG | SYSTOLIC BLOOD PRESSURE: 62 MMHG

## 2020-03-24 VITALS — SYSTOLIC BLOOD PRESSURE: 106 MMHG | DIASTOLIC BLOOD PRESSURE: 70 MMHG

## 2020-03-24 VITALS — SYSTOLIC BLOOD PRESSURE: 102 MMHG | DIASTOLIC BLOOD PRESSURE: 67 MMHG

## 2020-03-24 VITALS — SYSTOLIC BLOOD PRESSURE: 68 MMHG | DIASTOLIC BLOOD PRESSURE: 46 MMHG

## 2020-03-24 VITALS — DIASTOLIC BLOOD PRESSURE: 76 MMHG | SYSTOLIC BLOOD PRESSURE: 113 MMHG

## 2020-03-24 VITALS — DIASTOLIC BLOOD PRESSURE: 73 MMHG | SYSTOLIC BLOOD PRESSURE: 122 MMHG

## 2020-03-24 VITALS — DIASTOLIC BLOOD PRESSURE: 58 MMHG | SYSTOLIC BLOOD PRESSURE: 93 MMHG

## 2020-03-24 VITALS — DIASTOLIC BLOOD PRESSURE: 63 MMHG | SYSTOLIC BLOOD PRESSURE: 93 MMHG

## 2020-03-24 VITALS — DIASTOLIC BLOOD PRESSURE: 69 MMHG | SYSTOLIC BLOOD PRESSURE: 123 MMHG

## 2020-03-24 VITALS — SYSTOLIC BLOOD PRESSURE: 105 MMHG | DIASTOLIC BLOOD PRESSURE: 65 MMHG

## 2020-03-24 VITALS — DIASTOLIC BLOOD PRESSURE: 65 MMHG | SYSTOLIC BLOOD PRESSURE: 117 MMHG

## 2020-03-24 VITALS — DIASTOLIC BLOOD PRESSURE: 66 MMHG | SYSTOLIC BLOOD PRESSURE: 106 MMHG

## 2020-03-24 VITALS — SYSTOLIC BLOOD PRESSURE: 118 MMHG | DIASTOLIC BLOOD PRESSURE: 66 MMHG

## 2020-03-24 VITALS — SYSTOLIC BLOOD PRESSURE: 76 MMHG | DIASTOLIC BLOOD PRESSURE: 40 MMHG

## 2020-03-24 VITALS — DIASTOLIC BLOOD PRESSURE: 44 MMHG | SYSTOLIC BLOOD PRESSURE: 53 MMHG

## 2020-03-24 VITALS — DIASTOLIC BLOOD PRESSURE: 67 MMHG | SYSTOLIC BLOOD PRESSURE: 132 MMHG

## 2020-03-24 VITALS — SYSTOLIC BLOOD PRESSURE: 113 MMHG | DIASTOLIC BLOOD PRESSURE: 66 MMHG

## 2020-03-24 VITALS — SYSTOLIC BLOOD PRESSURE: 80 MMHG | DIASTOLIC BLOOD PRESSURE: 50 MMHG

## 2020-03-24 VITALS — SYSTOLIC BLOOD PRESSURE: 115 MMHG | DIASTOLIC BLOOD PRESSURE: 64 MMHG

## 2020-03-24 LAB
ALBUMIN SERPL BCP-MCNC: 2.1 G/DL (ref 3.4–5)
ALP SERPL-CCNC: 77 U/L (ref 46–116)
ALT SERPL W P-5'-P-CCNC: 25 U/L (ref 12–78)
APPEARANCE UR: (no result)
AST SERPL W P-5'-P-CCNC: 22 U/L (ref 15–37)
BASE EXCESS BLDA CALC-SCNC: 2.2 MMOL/L
BASOPHILS # BLD AUTO: 0 /CMM (ref 0–0.2)
BASOPHILS # BLD AUTO: 0 /CMM (ref 0–0.2)
BASOPHILS NFR BLD AUTO: 0.1 % (ref 0–2)
BASOPHILS NFR BLD AUTO: 0.2 % (ref 0–2)
BILIRUB DIRECT SERPL-MCNC: 0.1 MG/DL (ref 0–0.2)
BILIRUB SERPL-MCNC: 0.4 MG/DL (ref 0.2–1)
BILIRUB UR QL STRIP: (no result)
BUN SERPL-MCNC: 34 MG/DL (ref 7–18)
BUN SERPL-MCNC: 35 MG/DL (ref 7–18)
CALCIUM SERPL-MCNC: 8 MG/DL (ref 8.5–10.1)
CALCIUM SERPL-MCNC: 8.9 MG/DL (ref 8.5–10.1)
CHLORIDE SERPL-SCNC: 109 MMOL/L (ref 98–107)
CHLORIDE SERPL-SCNC: 112 MMOL/L (ref 98–107)
CO2 SERPL-SCNC: 30 MMOL/L (ref 21–32)
CO2 SERPL-SCNC: 38 MMOL/L (ref 21–32)
COLOR UR: (no result)
CREAT SERPL-MCNC: 1 MG/DL (ref 0.6–1.3)
CREAT SERPL-MCNC: 1 MG/DL (ref 0.6–1.3)
DO-HGB MFR BLDA: 150.3 MMHG
EOSINOPHIL NFR BLD AUTO: 0.1 % (ref 0–6)
EOSINOPHIL NFR BLD AUTO: 0.2 % (ref 0–6)
GLUCOSE SERPL-MCNC: 194 MG/DL (ref 74–106)
GLUCOSE SERPL-MCNC: 202 MG/DL (ref 74–106)
GLUCOSE UR STRIP-MCNC: NEGATIVE MG/DL
HCT VFR BLD AUTO: 24 % (ref 39–51)
HCT VFR BLD AUTO: 33 % (ref 39–51)
HGB BLD-MCNC: 10.1 G/DL (ref 13.5–17.5)
HGB BLD-MCNC: 7.5 G/DL (ref 13.5–17.5)
HGB UR QL STRIP: (no result) ERY/UL
INHALED O2 CONCENTRATION: 50 %
KETONES UR STRIP-MCNC: NEGATIVE MG/DL
LEUKOCYTE ESTERASE UR QL STRIP: NEGATIVE
LYMPHOCYTES NFR BLD AUTO: 0.5 /CMM (ref 0.8–4.8)
LYMPHOCYTES NFR BLD AUTO: 2.8 /CMM (ref 0.8–4.8)
LYMPHOCYTES NFR BLD AUTO: 25.9 % (ref 20–44)
LYMPHOCYTES NFR BLD AUTO: 8 % (ref 20–44)
MAGNESIUM SERPL-MCNC: 1.8 MG/DL (ref 1.8–2.4)
MCHC RBC AUTO-ENTMCNC: 31 G/DL (ref 31–36)
MCHC RBC AUTO-ENTMCNC: 32 G/DL (ref 31–36)
MCV RBC AUTO: 92 FL (ref 80–96)
MCV RBC AUTO: 93 FL (ref 80–96)
MONOCYTES NFR BLD AUTO: 0.2 /CMM (ref 0.1–1.3)
MONOCYTES NFR BLD AUTO: 0.7 /CMM (ref 0.1–1.3)
MONOCYTES NFR BLD AUTO: 3 % (ref 2–12)
MONOCYTES NFR BLD AUTO: 6.5 % (ref 2–12)
NEUTROPHILS # BLD AUTO: 5.4 /CMM (ref 1.8–8.9)
NEUTROPHILS # BLD AUTO: 7.3 /CMM (ref 1.8–8.9)
NEUTROPHILS NFR BLD AUTO: 67.2 % (ref 43–81)
NEUTROPHILS NFR BLD AUTO: 88.8 % (ref 43–81)
NITRITE UR QL STRIP: NEGATIVE
PCO2 TEMP ADJ BLDA: 71.8 MMHG (ref 35–45)
PH TEMP ADJ BLDA: 7.24 [PH] (ref 7.35–7.45)
PH UR STRIP: 5.5 [PH] (ref 5–8)
PLATELET # BLD AUTO: 172 /CMM (ref 150–450)
PLATELET # BLD AUTO: 252 /CMM (ref 150–450)
PO2 TEMP ADJ BLDA: 125.4 MMHG (ref 75–100)
POTASSIUM SERPL-SCNC: 3.9 MMOL/L (ref 3.5–5.1)
POTASSIUM SERPL-SCNC: 4.5 MMOL/L (ref 3.5–5.1)
PROT SERPL-MCNC: 7 G/DL (ref 6.4–8.2)
PROT UR QL STRIP: >=300 MG/DL
RBC # BLD AUTO: 2.57 MIL/UL (ref 4.5–6)
RBC # BLD AUTO: 3.55 MIL/UL (ref 4.5–6)
RBC #/AREA URNS HPF: (no result) /HPF (ref 0–2)
SAO2 % BLDA: 97.1 % (ref 92–98.5)
SODIUM SERPL-SCNC: 149 MMOL/L (ref 136–145)
SODIUM SERPL-SCNC: 149 MMOL/L (ref 136–145)
UROBILINOGEN UR STRIP-MCNC: 2 EU/DL
VENTILATION MODE VENT: (no result)
WBC #/AREA URNS HPF: (no result) /HPF (ref 0–3)
WBC NRBC COR # BLD AUTO: 10.8 K/UL (ref 4.3–11)
WBC NRBC COR # BLD AUTO: 6 K/UL (ref 4.3–11)

## 2020-03-24 PROCEDURE — 0JHL3XZ INSERTION OF TUNNELED VASCULAR ACCESS DEVICE INTO RIGHT UPPER LEG SUBCUTANEOUS TISSUE AND FASCIA, PERCUTANEOUS APPROACH: ICD-10-PCS | Performed by: NURSE PRACTITIONER

## 2020-03-24 PROCEDURE — 0BH17EZ INSERTION OF ENDOTRACHEAL AIRWAY INTO TRACHEA, VIA NATURAL OR ARTIFICIAL OPENING: ICD-10-PCS | Performed by: EMERGENCY MEDICINE

## 2020-03-24 PROCEDURE — 5A1945Z RESPIRATORY VENTILATION, 24-96 CONSECUTIVE HOURS: ICD-10-PCS | Performed by: INTERNAL MEDICINE

## 2020-03-24 PROCEDURE — B54BZZA ULTRASONOGRAPHY OF RIGHT LOWER EXTREMITY VEINS, GUIDANCE: ICD-10-PCS | Performed by: NURSE PRACTITIONER

## 2020-03-24 PROCEDURE — 06HM33Z INSERTION OF INFUSION DEVICE INTO RIGHT FEMORAL VEIN, PERCUTANEOUS APPROACH: ICD-10-PCS | Performed by: NURSE PRACTITIONER

## 2020-03-24 RX ADMIN — ALBUTEROL SULFATE SCH MG: 2.5 SOLUTION RESPIRATORY (INHALATION) at 11:00

## 2020-03-24 RX ADMIN — HYDROCORTISONE SODIUM SUCCINATE SCH MG: 100 INJECTION, POWDER, FOR SOLUTION INTRAMUSCULAR; INTRAVASCULAR at 08:49

## 2020-03-24 RX ADMIN — PROPOFOL PRN MLS/HR: 10 INJECTION, EMULSION INTRAVENOUS at 13:40

## 2020-03-24 RX ADMIN — ALBUTEROL SULFATE SCH MG: 2.5 SOLUTION RESPIRATORY (INHALATION) at 19:30

## 2020-03-24 RX ADMIN — HYDROCORTISONE SODIUM SUCCINATE SCH MG: 100 INJECTION, POWDER, FOR SOLUTION INTRAMUSCULAR; INTRAVASCULAR at 13:40

## 2020-03-24 RX ADMIN — Medication SCH MG: at 11:00

## 2020-03-24 RX ADMIN — PIPERACILLIN SODIUM AND TAZOBACTAM SODIUM SCH MLS/HR: .375; 3 INJECTION, POWDER, LYOPHILIZED, FOR SOLUTION INTRAVENOUS at 13:40

## 2020-03-24 RX ADMIN — PIPERACILLIN SODIUM AND TAZOBACTAM SODIUM SCH MLS/HR: .375; 3 INJECTION, POWDER, LYOPHILIZED, FOR SOLUTION INTRAVENOUS at 08:46

## 2020-03-24 RX ADMIN — HYDROCORTISONE SODIUM SUCCINATE SCH MG: 100 INJECTION, POWDER, FOR SOLUTION INTRAMUSCULAR; INTRAVASCULAR at 19:04

## 2020-03-24 RX ADMIN — SODIUM CHLORIDE SCH MG: 9 INJECTION, SOLUTION INTRAVENOUS at 08:49

## 2020-03-24 RX ADMIN — Medication SCH MG: at 19:30

## 2020-03-24 RX ADMIN — PIPERACILLIN SODIUM AND TAZOBACTAM SODIUM SCH MLS/HR: .375; 3 INJECTION, POWDER, LYOPHILIZED, FOR SOLUTION INTRAVENOUS at 19:03

## 2020-03-24 RX ADMIN — DEXTROSE MONOHYDRATE SCH MLS/HR: 50 INJECTION, SOLUTION INTRAVENOUS at 14:53

## 2020-03-24 RX ADMIN — PROPOFOL PRN MLS/HR: 10 INJECTION, EMULSION INTRAVENOUS at 03:46

## 2020-03-24 RX ADMIN — SODIUM CHLORIDE PRN MLS/HR: 9 INJECTION, SOLUTION INTRAVENOUS at 18:00

## 2020-03-24 RX ADMIN — PIPERACILLIN SODIUM AND TAZOBACTAM SODIUM SCH MLS/HR: .375; 3 INJECTION, POWDER, LYOPHILIZED, FOR SOLUTION INTRAVENOUS at 23:14

## 2020-03-24 RX ADMIN — SODIUM CHLORIDE PRN MLS/HR: 9 INJECTION, SOLUTION INTRAVENOUS at 03:47

## 2020-03-24 NOTE — NUR
ICU RN ADMISSION NOTES



RECEIVED PATIENT FROM ER VIA [facility]. PATIENT ORALLY INTUBATED ON MECHANICAL VENT, ON 
SETTINGS AS PRESCRIBED BY MD, TOLERATING WELL, NO S/S OF RESPIRATORY DISTRESS. HR IN THE 
16Os, DR MATUTE PAGED. PERIPHERAL IVs ALL PATENT AND INTACT, INCLUDING RIGHT FOOT #18G. 
ALL SITES FREE FROM ANY S/S OF INFILTRATION OR PHLEBITIS. SKIN ISSUES PHOTOGRAPHED AND 
DOCUMENTED PER PROTOCOL. WILL MONITOR CLOSELY

## 2020-03-24 NOTE — NUR
ICU/RN:



Dr Malone at bedside, update on pt status. Critical labs (lactic acid) and downward trend 
of hemoglobin reviewed. New orders noted and carried out.

## 2020-03-24 NOTE — NUR
ICU RN INITIAL SHIFT NOTES



RECEIVED PATIENT IN BED, EYES CLOSED, SEDATED ON DIPRIVAN DRIP, EASILY WOKEN TO TACTILE 
STIMULI, ABLE TO FOLLOW SIMPLE COMMANDS WHILE OFF SEDATION. ORALLY INTUBATED, ON MECHANICAL 
VENTILATION, SETTINGS AS PRESCRIBED, TOLERATING WELL, FREE FROM ANY S/S OF RESPIRATORY 
DISTRESS. OGT PATENT AND INTACT, CLAMPED. BEDSIDE TELEMETRY MONITOR READS SINUS RHYTHM. 
KRAUSE CATHETER PATENT AND INTACT, DRAINING CLEAR, DARK YELLOW URINE VIA GRAVITY. LUKE PICC 
PATENT AND INTACT, INFUSING NS @ 75 ML/HR AND DIPRIVAN DRIP @ 10MCG

## 2020-03-24 NOTE — NUR
ICU RN NOTES



PATIENT REMAINS ORALLY INTUABTED ON MWECHANICAL VENTILATION. LUKE PICC RUNNING NS @ 75ML/HR, 
DIPRIVAN GTT @ 5MCG, AND NEOSYNEPHRINE DRIP @ 0.5MCG. WILL ENDORSE THE PATIENT TO THE AM 
SHIFT NURSE FOR CONTINUITY OF CARE

## 2020-03-24 NOTE — NUR
ICU RN NOTES



0400 DR MATUTE PAGED, NO CALL BACK, MESSAGE LEFT. 

0520 ABLE TO GET A HOLD OF MD,RELAYED THAT BP HAS BEEN TRENDING LOW AFTER CARDIZEM 
ADMINISTERED. PER MD, START NEOSYNEPHRINE DRIP INSTEAD OF LEVOPHED, AND START AMIODARONE 
DRIP PER PROTOCOL. ALL ORDERS READ BACK FOR CLARIFICATION. WILL CARRY OUT ALL NEW ORDERS

## 2020-03-24 NOTE — NUR
ICU/RN:



Pt noted as increasingly awake after line insertion. Pt alert, follows commands. Oriented to 
unit and educated. Nods head in agreement. Titrated diprivan to goal SAS score 3 per 
protocol for pt comfort.

## 2020-03-24 NOTE — NUR
ICU RN NOTES



PATIENT'S HR NOTED 165 BPM, SUSTAINED. DR MATUTE CALLED, WITH NEW ORDER FOR CARDIZEM 10MG 
IV PUSH X1. IF INEFFECTIVE, START CARDIZEM DRIP. TITRATE TO KEEP HR <110. ORDER READ BACK 
FOR CLARIFICATION. WILL CARRY OUT NEW ORDERS

## 2020-03-24 NOTE — NUR
RT end of the shift report.

pt. 85 Y old male rec. @0700 AM none responsive, orally intubated ETT # 7.5 @ 24 cm lip line 
on vent with noted settings, 

alarms are set and functional. equal chest rise noted. bilaterally rales b/s noted. sux'd 
for minimal amt. tan secretions. Vent changes done post ABG per Dr. Coreas order at the 
bedside. inline tx's starting at 1930 per MD t/o shift. MLT done, HME changed, pt. remain 
stable. Vent plugged into red out let and AMBU bag remain at the bedside. 

report will pass to PM shift.

-------------------------------------------------------------------------------

Addendum: 03/24/20 at 1839 by SUZANNE BALDERRAMA RT

-------------------------------------------------------------------------------

Amended: Links added.

## 2020-03-25 VITALS — SYSTOLIC BLOOD PRESSURE: 112 MMHG | DIASTOLIC BLOOD PRESSURE: 62 MMHG

## 2020-03-25 VITALS — DIASTOLIC BLOOD PRESSURE: 81 MMHG | SYSTOLIC BLOOD PRESSURE: 126 MMHG

## 2020-03-25 VITALS — DIASTOLIC BLOOD PRESSURE: 67 MMHG | SYSTOLIC BLOOD PRESSURE: 116 MMHG

## 2020-03-25 VITALS — DIASTOLIC BLOOD PRESSURE: 76 MMHG | SYSTOLIC BLOOD PRESSURE: 125 MMHG

## 2020-03-25 VITALS — DIASTOLIC BLOOD PRESSURE: 64 MMHG | SYSTOLIC BLOOD PRESSURE: 123 MMHG

## 2020-03-25 VITALS — DIASTOLIC BLOOD PRESSURE: 65 MMHG | SYSTOLIC BLOOD PRESSURE: 111 MMHG

## 2020-03-25 VITALS — SYSTOLIC BLOOD PRESSURE: 115 MMHG | DIASTOLIC BLOOD PRESSURE: 82 MMHG

## 2020-03-25 VITALS — SYSTOLIC BLOOD PRESSURE: 117 MMHG | DIASTOLIC BLOOD PRESSURE: 70 MMHG

## 2020-03-25 VITALS — DIASTOLIC BLOOD PRESSURE: 64 MMHG | SYSTOLIC BLOOD PRESSURE: 110 MMHG

## 2020-03-25 VITALS — SYSTOLIC BLOOD PRESSURE: 130 MMHG | DIASTOLIC BLOOD PRESSURE: 75 MMHG

## 2020-03-25 VITALS — DIASTOLIC BLOOD PRESSURE: 73 MMHG | SYSTOLIC BLOOD PRESSURE: 113 MMHG

## 2020-03-25 VITALS — DIASTOLIC BLOOD PRESSURE: 62 MMHG | SYSTOLIC BLOOD PRESSURE: 115 MMHG

## 2020-03-25 VITALS — DIASTOLIC BLOOD PRESSURE: 66 MMHG | SYSTOLIC BLOOD PRESSURE: 118 MMHG

## 2020-03-25 VITALS — DIASTOLIC BLOOD PRESSURE: 73 MMHG | SYSTOLIC BLOOD PRESSURE: 126 MMHG

## 2020-03-25 VITALS — DIASTOLIC BLOOD PRESSURE: 78 MMHG | SYSTOLIC BLOOD PRESSURE: 125 MMHG

## 2020-03-25 VITALS — DIASTOLIC BLOOD PRESSURE: 72 MMHG | SYSTOLIC BLOOD PRESSURE: 125 MMHG

## 2020-03-25 VITALS — DIASTOLIC BLOOD PRESSURE: 74 MMHG | SYSTOLIC BLOOD PRESSURE: 116 MMHG

## 2020-03-25 VITALS — SYSTOLIC BLOOD PRESSURE: 116 MMHG | DIASTOLIC BLOOD PRESSURE: 70 MMHG

## 2020-03-25 VITALS — SYSTOLIC BLOOD PRESSURE: 119 MMHG | DIASTOLIC BLOOD PRESSURE: 74 MMHG

## 2020-03-25 VITALS — DIASTOLIC BLOOD PRESSURE: 66 MMHG | SYSTOLIC BLOOD PRESSURE: 108 MMHG

## 2020-03-25 VITALS — SYSTOLIC BLOOD PRESSURE: 110 MMHG | DIASTOLIC BLOOD PRESSURE: 64 MMHG

## 2020-03-25 VITALS — SYSTOLIC BLOOD PRESSURE: 127 MMHG | DIASTOLIC BLOOD PRESSURE: 74 MMHG

## 2020-03-25 VITALS — SYSTOLIC BLOOD PRESSURE: 128 MMHG | DIASTOLIC BLOOD PRESSURE: 67 MMHG

## 2020-03-25 VITALS — SYSTOLIC BLOOD PRESSURE: 133 MMHG | DIASTOLIC BLOOD PRESSURE: 78 MMHG

## 2020-03-25 VITALS — DIASTOLIC BLOOD PRESSURE: 79 MMHG | SYSTOLIC BLOOD PRESSURE: 139 MMHG

## 2020-03-25 VITALS — DIASTOLIC BLOOD PRESSURE: 75 MMHG | SYSTOLIC BLOOD PRESSURE: 136 MMHG

## 2020-03-25 VITALS — SYSTOLIC BLOOD PRESSURE: 124 MMHG | DIASTOLIC BLOOD PRESSURE: 77 MMHG

## 2020-03-25 VITALS — SYSTOLIC BLOOD PRESSURE: 111 MMHG | DIASTOLIC BLOOD PRESSURE: 66 MMHG

## 2020-03-25 VITALS — DIASTOLIC BLOOD PRESSURE: 66 MMHG | SYSTOLIC BLOOD PRESSURE: 116 MMHG

## 2020-03-25 VITALS — DIASTOLIC BLOOD PRESSURE: 70 MMHG | SYSTOLIC BLOOD PRESSURE: 116 MMHG

## 2020-03-25 VITALS — DIASTOLIC BLOOD PRESSURE: 75 MMHG | SYSTOLIC BLOOD PRESSURE: 125 MMHG

## 2020-03-25 LAB
ALBUMIN SERPL BCP-MCNC: 1.3 G/DL (ref 3.4–5)
ALP SERPL-CCNC: 53 U/L (ref 46–116)
ALT SERPL W P-5'-P-CCNC: 19 U/L (ref 12–78)
AST SERPL W P-5'-P-CCNC: 23 U/L (ref 15–37)
BASE EXCESS BLDA CALC-SCNC: -1.7 MMOL/L
BASOPHILS # BLD AUTO: 0 /CMM (ref 0–0.2)
BASOPHILS NFR BLD AUTO: 0.2 % (ref 0–2)
BILIRUB SERPL-MCNC: 0.4 MG/DL (ref 0.2–1)
BUN SERPL-MCNC: 35 MG/DL (ref 7–18)
CALCIUM SERPL-MCNC: 7.4 MG/DL (ref 8.5–10.1)
CHLORIDE SERPL-SCNC: 112 MMOL/L (ref 98–107)
CO2 SERPL-SCNC: 30 MMOL/L (ref 21–32)
CREAT SERPL-MCNC: 0.9 MG/DL (ref 0.6–1.3)
DO-HGB MFR BLDA: 119.2 MMHG
EOSINOPHIL NFR BLD AUTO: 0 % (ref 0–6)
GLUCOSE SERPL-MCNC: 135 MG/DL (ref 74–106)
HCT VFR BLD AUTO: 23 % (ref 39–51)
HGB BLD-MCNC: 7.2 G/DL (ref 13.5–17.5)
INHALED O2 CONCENTRATION: 50 %
LYMPHOCYTES NFR BLD AUTO: 0.4 /CMM (ref 0.8–4.8)
LYMPHOCYTES NFR BLD AUTO: 4 % (ref 20–44)
MAGNESIUM SERPL-MCNC: 1.6 MG/DL (ref 1.8–2.4)
MCHC RBC AUTO-ENTMCNC: 31 G/DL (ref 31–36)
MCV RBC AUTO: 92 FL (ref 80–96)
MONOCYTES NFR BLD AUTO: 0.4 /CMM (ref 0.1–1.3)
MONOCYTES NFR BLD AUTO: 4.7 % (ref 2–12)
NEUTROPHILS # BLD AUTO: 8 /CMM (ref 1.8–8.9)
NEUTROPHILS NFR BLD AUTO: 91.1 % (ref 43–81)
PCO2 TEMP ADJ BLDA: 37.1 MMHG (ref 35–45)
PH TEMP ADJ BLDA: 7.41 [PH] (ref 7.35–7.45)
PHOSPHATE SERPL-MCNC: 3.2 MG/DL (ref 2.5–4.9)
PLATELET # BLD AUTO: 131 /CMM (ref 150–450)
PO2 TEMP ADJ BLDA: 195.6 MMHG (ref 75–100)
POTASSIUM SERPL-SCNC: 3.3 MMOL/L (ref 3.5–5.1)
PROT SERPL-MCNC: 4.8 G/DL (ref 6.4–8.2)
RBC # BLD AUTO: 2.54 MIL/UL (ref 4.5–6)
SAO2 % BLDA: 98.6 % (ref 92–98.5)
SODIUM SERPL-SCNC: 149 MMOL/L (ref 136–145)
VENTILATION MODE VENT: (no result)
WBC NRBC COR # BLD AUTO: 8.8 K/UL (ref 4.3–11)

## 2020-03-25 RX ADMIN — SODIUM CHLORIDE SCH MG: 9 INJECTION, SOLUTION INTRAVENOUS at 08:16

## 2020-03-25 RX ADMIN — PROPOFOL PRN MLS/HR: 10 INJECTION, EMULSION INTRAVENOUS at 20:25

## 2020-03-25 RX ADMIN — POTASSIUM CHLORIDE SCH MLS/HR: 200 INJECTION, SOLUTION INTRAVENOUS at 10:43

## 2020-03-25 RX ADMIN — POTASSIUM CHLORIDE SCH MLS/HR: 200 INJECTION, SOLUTION INTRAVENOUS at 13:07

## 2020-03-25 RX ADMIN — Medication SCH OZ: at 08:16

## 2020-03-25 RX ADMIN — POTASSIUM CHLORIDE SCH MLS/HR: 200 INJECTION, SOLUTION INTRAVENOUS at 08:16

## 2020-03-25 RX ADMIN — MAGNESIUM SULFATE IN DEXTROSE SCH MLS/HR: 10 INJECTION, SOLUTION INTRAVENOUS at 08:46

## 2020-03-25 RX ADMIN — ALBUTEROL SULFATE SCH MG: 2.5 SOLUTION RESPIRATORY (INHALATION) at 07:35

## 2020-03-25 RX ADMIN — DEXTROSE MONOHYDRATE SCH MLS/HR: 50 INJECTION, SOLUTION INTRAVENOUS at 16:21

## 2020-03-25 RX ADMIN — PIPERACILLIN SODIUM AND TAZOBACTAM SODIUM SCH MLS/HR: .375; 3 INJECTION, POWDER, LYOPHILIZED, FOR SOLUTION INTRAVENOUS at 11:52

## 2020-03-25 RX ADMIN — POTASSIUM CHLORIDE SCH MLS/HR: 200 INJECTION, SOLUTION INTRAVENOUS at 09:05

## 2020-03-25 RX ADMIN — POTASSIUM CHLORIDE SCH MLS/HR: 200 INJECTION, SOLUTION INTRAVENOUS at 11:43

## 2020-03-25 RX ADMIN — SODIUM CHLORIDE SCH MLS/HR: 9 INJECTION, SOLUTION INTRAVENOUS at 13:43

## 2020-03-25 RX ADMIN — Medication SCH MG: at 13:06

## 2020-03-25 RX ADMIN — HYDROCORTISONE SODIUM SUCCINATE SCH MG: 100 INJECTION, POWDER, FOR SOLUTION INTRAMUSCULAR; INTRAVASCULAR at 16:21

## 2020-03-25 RX ADMIN — Medication SCH MG: at 01:30

## 2020-03-25 RX ADMIN — ALBUTEROL SULFATE SCH MG: 2.5 SOLUTION RESPIRATORY (INHALATION) at 13:06

## 2020-03-25 RX ADMIN — PROPOFOL PRN MLS/HR: 10 INJECTION, EMULSION INTRAVENOUS at 13:07

## 2020-03-25 RX ADMIN — ALBUTEROL SULFATE SCH MG: 2.5 SOLUTION RESPIRATORY (INHALATION) at 01:30

## 2020-03-25 RX ADMIN — DEXTROSE MONOHYDRATE SCH MLS/HR: 50 INJECTION, SOLUTION INTRAVENOUS at 02:32

## 2020-03-25 RX ADMIN — HYDROCORTISONE SODIUM SUCCINATE SCH MG: 100 INJECTION, POWDER, FOR SOLUTION INTRAMUSCULAR; INTRAVASCULAR at 08:16

## 2020-03-25 RX ADMIN — PIPERACILLIN SODIUM AND TAZOBACTAM SODIUM SCH MLS/HR: .375; 3 INJECTION, POWDER, LYOPHILIZED, FOR SOLUTION INTRAVENOUS at 17:38

## 2020-03-25 RX ADMIN — MAGNESIUM SULFATE IN DEXTROSE SCH MLS/HR: 10 INJECTION, SOLUTION INTRAVENOUS at 07:42

## 2020-03-25 RX ADMIN — Medication SCH MG: at 19:30

## 2020-03-25 RX ADMIN — PIPERACILLIN SODIUM AND TAZOBACTAM SODIUM SCH MLS/HR: .375; 3 INJECTION, POWDER, LYOPHILIZED, FOR SOLUTION INTRAVENOUS at 05:21

## 2020-03-25 RX ADMIN — POTASSIUM CHLORIDE SCH MLS/HR: 200 INJECTION, SOLUTION INTRAVENOUS at 09:53

## 2020-03-25 RX ADMIN — SODIUM CHLORIDE PRN MLS/HR: 9 INJECTION, SOLUTION INTRAVENOUS at 11:05

## 2020-03-25 RX ADMIN — Medication SCH MG: at 07:35

## 2020-03-25 RX ADMIN — ALBUTEROL SULFATE SCH MG: 2.5 SOLUTION RESPIRATORY (INHALATION) at 19:30

## 2020-03-25 RX ADMIN — MEROPENEM SCH MLS/HR: 1 INJECTION INTRAVENOUS at 20:30

## 2020-03-25 RX ADMIN — PROPOFOL PRN MLS/HR: 10 INJECTION, EMULSION INTRAVENOUS at 02:32

## 2020-03-25 RX ADMIN — HYDROCORTISONE SODIUM SUCCINATE SCH MG: 100 INJECTION, POWDER, FOR SOLUTION INTRAMUSCULAR; INTRAVASCULAR at 13:39

## 2020-03-25 NOTE — NUR
RN NOTES



IV POTASSIUM GIVEN OFF SCHEDULE BECAUSE MEDICATION WAS UNAVAILABLE WHEN IT WAS INITIALLY 
ORDERED. WILL CONTINUE TO MONITOR AND ADMIN ALL PRESCRIBED DOSE

## 2020-03-25 NOTE — NUR
RN OPENING NOTES



RECEIVED PATIENT RESTING IN BED COMFORTABLY. PT IS SEDATED ON DIPRIVAN DRIP, RUNNING AT 25 
MCG/MIN, TOLERATING WELL, NO AGITATION PRESENT. HE IS ON MECHANICAL VENT VIA ETT, 22 AT THE 
LIP, TOLERATING SETTINGS WELL, NO SOB OR RESP DISTRESS PRESENT. TELE MONITOR SHOWING SR. 
OGTUBE IS PATENT AND INTACT, SET TO SUCTION. KRAUSE CATH IS PATENT AND INTACT, DRAINING URINE 
BY GRAVITY. IV SITE ON RFEMORAL TLC, LFA 18, AND RFA 20G IS PATENT AND INTACT, NS RUNNING AT 
75 ML.HR.  SAFETY MEASURES HAVE BEEN IMPLEMENTED, CALL LIGHT IS WITHIN REACH, BED IS IN 
LOWEST AND LOCKED POSITION, SIDE RAILS UP X2, WILL CONTINUE TO MONITOR FOR ANY CHANGES.

## 2020-03-25 NOTE — NUR
WOUND CARE CONSULT: PT PRESENTS WITH SACRAL SCARRING, PRESENT ON ADMISSION. RECOMMENDATIONS 
MADE FOR SKIN PROTECTION. DISCUSSED WITH NURSING STAFF. FIRST STEP LOW AIRLOSS MATTRESS ON 
ORDER. WILL SEE CATIA HAMM IN AGREEMENT WITH PLAN OF CARE. CURRENT PERLA SCORE IS 9. 

-------------------------------------------------------------------------------

Addendum: 03/25/20 at 0802 by SHEKHAR MALDONADO WNDNU

-------------------------------------------------------------------------------

Amended: Links added.

## 2020-03-25 NOTE — NUR
ICU RN NOTES



PATIENT NOTED TO BE MORE AWAKE DESPITE DIPRIVAN DRIP, STARTING TO BECOME AGITATED, SHAKING 
HEAD TO THE LEFT AND RIGHT. DIPRIVAN DRIP TITRATED UP, WILL MONITOR

## 2020-03-25 NOTE — NUR
PATIENT CONTINUES TO BE R/O COVID-19 IN NO ACUTE DISTRESS. PATIENT TOLERATING VENT WITH 
SETTING AT AC 14, , FIO2 40, PEEP 5. ETT IS 7.5/22 AT THE LIP. SR ON THE MONITOR AND 
KRAUSE CATHETER CLEAN DRY INTACT AND PATENT WITH YELLOW  URINE DRAINING. OG TUBE IS CLEAN AND 
CLAMPED. PATIENT TURNED Q2H PER PROTOCOL. RIGHT FEMORAL TLC PICC IS CLEAN DRY INTACT AND 
PATENT WITH DIPRIVAN @ 40 AND NS @ 75ML/HR. BED IN LOW LOCK POSITION WITH RIALS UP X 2. CALL 
LIGHT WITHIN REACH AND ALL SAFETY MEASURES ENSURED AND CARRIED OUT.

## 2020-03-25 NOTE — NUR
ICU RN NOTES



PATIENT RESTING IN BED, APPEARS COMFORTABLE AT THIS TIME, TITRATION OF DIPRIVAN DRIP TO 
25MCG EFFECTIVE. WILL ENDORSE THE PATIENT TO THE AM SHIFT NURSE FOR CONTINUITY OF CARE

## 2020-03-25 NOTE — NUR
RT end of the shift report.

pt. 85 Y old male rec. @0700 am non-responsive, orally intubated ETT # 7.5 @23 cm lip line 
on vent with noted settings.

B/S bilaterally rales noted. Sux'd for minimal AMT. tan secretions. tx's not given per MD. 

no vent changes t/o shift. except titrating Fio2 to 40%, MLT done, HME changed, pt. remain 
stable. 

vent into red out let and AMBU bag remain at the bedside. 

report will pass to PM shift.

-------------------------------------------------------------------------------

Addendum: 03/25/20 at 1752 by SUZANNE BALDERRAMA RT

-------------------------------------------------------------------------------

Amended: Links added.

## 2020-03-25 NOTE — NUR
BREATHING TREATMENT NOT GIVEN PER MD DR. DEMETRIUS SCHULTZ. CHARGE NURSE AWARE AND RT AWARE. PATIENT 
IS INTUBATED AND IN NO ACUTE DISTRESS.

## 2020-03-25 NOTE — NUR
RN NOTES



WHILE CLEANING THE PT, HE BECAME SLIGHTLY AGITATED AND STARTED REACHING TOWARDS HIS ETT. 
PROPOFOL WAS INCREASED FROM 35 TO 40 MCG/MIN. WILL CONTINUE TO MONITOR FOR ANY CHANGES.

## 2020-03-25 NOTE — NUR
RN NOTES



PATIENT HAS HIS EYES OPEN AND IS ABLE TO COMPREHEND AND RESPOND TO VERBAL STIMULI BY HEAD 
NODDING. INCREASED PROPOFOL DRIP FROM 35 MCG/MIN TO 40 MCG/MIN. VITAL SIGNS ARE STABLE. WILL 
CONTINUE TO MONITOR FOR ANY CHANGES

## 2020-03-25 NOTE — NUR
RN NOTES



PT HAS TESTED POSITIVE FOR MRSA OF THE NARES. SENT MESSAGE TO DR. MATUTE, WAITING FOR 
RESPONSE, CONTACT ISOLATION HAS BEEN INITIATED, WILL CONTINUE TO MONITOR FOR ANY CHANGES.

## 2020-03-26 VITALS — DIASTOLIC BLOOD PRESSURE: 62 MMHG | SYSTOLIC BLOOD PRESSURE: 120 MMHG

## 2020-03-26 VITALS — SYSTOLIC BLOOD PRESSURE: 109 MMHG | DIASTOLIC BLOOD PRESSURE: 51 MMHG

## 2020-03-26 VITALS — SYSTOLIC BLOOD PRESSURE: 144 MMHG | DIASTOLIC BLOOD PRESSURE: 71 MMHG

## 2020-03-26 VITALS — SYSTOLIC BLOOD PRESSURE: 124 MMHG | DIASTOLIC BLOOD PRESSURE: 74 MMHG

## 2020-03-26 VITALS — DIASTOLIC BLOOD PRESSURE: 80 MMHG | SYSTOLIC BLOOD PRESSURE: 139 MMHG

## 2020-03-26 VITALS — SYSTOLIC BLOOD PRESSURE: 117 MMHG | DIASTOLIC BLOOD PRESSURE: 60 MMHG

## 2020-03-26 VITALS — SYSTOLIC BLOOD PRESSURE: 97 MMHG | DIASTOLIC BLOOD PRESSURE: 54 MMHG

## 2020-03-26 VITALS — SYSTOLIC BLOOD PRESSURE: 104 MMHG | DIASTOLIC BLOOD PRESSURE: 57 MMHG

## 2020-03-26 VITALS — SYSTOLIC BLOOD PRESSURE: 131 MMHG | DIASTOLIC BLOOD PRESSURE: 74 MMHG

## 2020-03-26 VITALS — DIASTOLIC BLOOD PRESSURE: 80 MMHG | SYSTOLIC BLOOD PRESSURE: 122 MMHG

## 2020-03-26 VITALS — DIASTOLIC BLOOD PRESSURE: 95 MMHG | SYSTOLIC BLOOD PRESSURE: 148 MMHG

## 2020-03-26 VITALS — SYSTOLIC BLOOD PRESSURE: 109 MMHG | DIASTOLIC BLOOD PRESSURE: 64 MMHG

## 2020-03-26 VITALS — SYSTOLIC BLOOD PRESSURE: 148 MMHG | DIASTOLIC BLOOD PRESSURE: 87 MMHG

## 2020-03-26 VITALS — DIASTOLIC BLOOD PRESSURE: 56 MMHG | SYSTOLIC BLOOD PRESSURE: 101 MMHG

## 2020-03-26 VITALS — DIASTOLIC BLOOD PRESSURE: 74 MMHG | SYSTOLIC BLOOD PRESSURE: 136 MMHG

## 2020-03-26 VITALS — DIASTOLIC BLOOD PRESSURE: 84 MMHG | SYSTOLIC BLOOD PRESSURE: 146 MMHG

## 2020-03-26 VITALS — SYSTOLIC BLOOD PRESSURE: 112 MMHG | DIASTOLIC BLOOD PRESSURE: 61 MMHG

## 2020-03-26 VITALS — SYSTOLIC BLOOD PRESSURE: 103 MMHG | DIASTOLIC BLOOD PRESSURE: 60 MMHG

## 2020-03-26 VITALS — DIASTOLIC BLOOD PRESSURE: 85 MMHG | SYSTOLIC BLOOD PRESSURE: 135 MMHG

## 2020-03-26 VITALS — DIASTOLIC BLOOD PRESSURE: 87 MMHG | SYSTOLIC BLOOD PRESSURE: 152 MMHG

## 2020-03-26 VITALS — SYSTOLIC BLOOD PRESSURE: 163 MMHG | DIASTOLIC BLOOD PRESSURE: 103 MMHG

## 2020-03-26 VITALS — SYSTOLIC BLOOD PRESSURE: 97 MMHG | DIASTOLIC BLOOD PRESSURE: 57 MMHG

## 2020-03-26 VITALS — DIASTOLIC BLOOD PRESSURE: 115 MMHG | SYSTOLIC BLOOD PRESSURE: 179 MMHG

## 2020-03-26 VITALS — SYSTOLIC BLOOD PRESSURE: 149 MMHG | DIASTOLIC BLOOD PRESSURE: 85 MMHG

## 2020-03-26 VITALS — SYSTOLIC BLOOD PRESSURE: 149 MMHG | DIASTOLIC BLOOD PRESSURE: 80 MMHG

## 2020-03-26 VITALS — DIASTOLIC BLOOD PRESSURE: 78 MMHG | SYSTOLIC BLOOD PRESSURE: 140 MMHG

## 2020-03-26 VITALS — SYSTOLIC BLOOD PRESSURE: 172 MMHG | DIASTOLIC BLOOD PRESSURE: 83 MMHG

## 2020-03-26 VITALS — SYSTOLIC BLOOD PRESSURE: 154 MMHG | DIASTOLIC BLOOD PRESSURE: 92 MMHG

## 2020-03-26 VITALS — DIASTOLIC BLOOD PRESSURE: 82 MMHG | SYSTOLIC BLOOD PRESSURE: 151 MMHG

## 2020-03-26 VITALS — SYSTOLIC BLOOD PRESSURE: 112 MMHG | DIASTOLIC BLOOD PRESSURE: 56 MMHG

## 2020-03-26 VITALS — SYSTOLIC BLOOD PRESSURE: 114 MMHG | DIASTOLIC BLOOD PRESSURE: 69 MMHG

## 2020-03-26 VITALS — SYSTOLIC BLOOD PRESSURE: 187 MMHG | DIASTOLIC BLOOD PRESSURE: 118 MMHG

## 2020-03-26 VITALS — DIASTOLIC BLOOD PRESSURE: 69 MMHG | SYSTOLIC BLOOD PRESSURE: 122 MMHG

## 2020-03-26 VITALS — SYSTOLIC BLOOD PRESSURE: 117 MMHG | DIASTOLIC BLOOD PRESSURE: 63 MMHG

## 2020-03-26 LAB
BASE EXCESS BLDA CALC-SCNC: 0.9 MMOL/L
BASOPHILS # BLD AUTO: 0 /CMM (ref 0–0.2)
BASOPHILS NFR BLD AUTO: 0.1 % (ref 0–2)
BUN SERPL-MCNC: 32 MG/DL (ref 7–18)
CALCIUM SERPL-MCNC: 7.6 MG/DL (ref 8.5–10.1)
CHLORIDE SERPL-SCNC: 114 MMOL/L (ref 98–107)
CO2 SERPL-SCNC: 29 MMOL/L (ref 21–32)
CREAT SERPL-MCNC: 0.8 MG/DL (ref 0.6–1.3)
DO-HGB MFR BLDA: 118.8 MMHG
EOSINOPHIL NFR BLD AUTO: 0 % (ref 0–6)
GLUCOSE SERPL-MCNC: 107 MG/DL (ref 74–106)
HCT VFR BLD AUTO: 24 % (ref 39–51)
HGB BLD-MCNC: 7.3 G/DL (ref 13.5–17.5)
INHALED O2 CONCENTRATION: 40 %
LYMPHOCYTES NFR BLD AUTO: 0.6 /CMM (ref 0.8–4.8)
LYMPHOCYTES NFR BLD AUTO: 5.5 % (ref 20–44)
MAGNESIUM SERPL-MCNC: 2.3 MG/DL (ref 1.8–2.4)
MCHC RBC AUTO-ENTMCNC: 31 G/DL (ref 31–36)
MCV RBC AUTO: 92 FL (ref 80–96)
MONOCYTES NFR BLD AUTO: 0.6 /CMM (ref 0.1–1.3)
MONOCYTES NFR BLD AUTO: 5.1 % (ref 2–12)
NEUTROPHILS # BLD AUTO: 9.8 /CMM (ref 1.8–8.9)
NEUTROPHILS NFR BLD AUTO: 89.3 % (ref 43–81)
PCO2 TEMP ADJ BLDA: 48.4 MMHG (ref 35–45)
PH TEMP ADJ BLDA: 7.36 [PH] (ref 7.35–7.45)
PLATELET # BLD AUTO: 121 /CMM (ref 150–450)
PO2 TEMP ADJ BLDA: 110.7 MMHG (ref 75–100)
POTASSIUM SERPL-SCNC: 4 MMOL/L (ref 3.5–5.1)
RBC # BLD AUTO: 2.59 MIL/UL (ref 4.5–6)
SAO2 % BLDA: 97.6 % (ref 92–98.5)
SODIUM SERPL-SCNC: 148 MMOL/L (ref 136–145)
VENTILATION MODE VENT: (no result)
WBC NRBC COR # BLD AUTO: 11 K/UL (ref 4.3–11)

## 2020-03-26 RX ADMIN — MEROPENEM SCH MLS/HR: 1 INJECTION INTRAVENOUS at 21:25

## 2020-03-26 RX ADMIN — DEXTROSE MONOHYDRATE SCH MLS/HR: 50 INJECTION, SOLUTION INTRAVENOUS at 14:08

## 2020-03-26 RX ADMIN — ALBUTEROL SULFATE SCH MG: 2.5 SOLUTION RESPIRATORY (INHALATION) at 14:08

## 2020-03-26 RX ADMIN — Medication SCH MG: at 08:05

## 2020-03-26 RX ADMIN — ALBUTEROL SULFATE SCH MG: 2.5 SOLUTION RESPIRATORY (INHALATION) at 08:05

## 2020-03-26 RX ADMIN — MUPIROCIN SCH APPLIC: 20 OINTMENT TOPICAL at 16:09

## 2020-03-26 RX ADMIN — ALBUTEROL SULFATE SCH MG: 2.5 SOLUTION RESPIRATORY (INHALATION) at 01:23

## 2020-03-26 RX ADMIN — DEXTROSE MONOHYDRATE SCH MLS/HR: 50 INJECTION, SOLUTION INTRAVENOUS at 03:49

## 2020-03-26 RX ADMIN — Medication SCH OZ: at 08:07

## 2020-03-26 RX ADMIN — SODIUM CHLORIDE PRN MLS/HR: 9 INJECTION, SOLUTION INTRAVENOUS at 03:50

## 2020-03-26 RX ADMIN — HYDROCORTISONE SODIUM SUCCINATE SCH MG: 100 INJECTION, POWDER, FOR SOLUTION INTRAMUSCULAR; INTRAVASCULAR at 08:07

## 2020-03-26 RX ADMIN — MEROPENEM SCH MLS/HR: 1 INJECTION INTRAVENOUS at 08:08

## 2020-03-26 RX ADMIN — SODIUM CHLORIDE PRN MLS/HR: 9 INJECTION, SOLUTION INTRAVENOUS at 16:09

## 2020-03-26 RX ADMIN — Medication SCH MG: at 01:22

## 2020-03-26 RX ADMIN — PROPOFOL PRN MLS/HR: 10 INJECTION, EMULSION INTRAVENOUS at 03:49

## 2020-03-26 RX ADMIN — HYDROCORTISONE SODIUM SUCCINATE SCH MG: 100 INJECTION, POWDER, FOR SOLUTION INTRAMUSCULAR; INTRAVASCULAR at 16:09

## 2020-03-26 RX ADMIN — HYDROCORTISONE SODIUM SUCCINATE SCH MG: 100 INJECTION, POWDER, FOR SOLUTION INTRAMUSCULAR; INTRAVASCULAR at 12:02

## 2020-03-26 RX ADMIN — SODIUM CHLORIDE SCH MG: 9 INJECTION, SOLUTION INTRAVENOUS at 08:07

## 2020-03-26 RX ADMIN — SODIUM CHLORIDE SCH MLS/HR: 9 INJECTION, SOLUTION INTRAVENOUS at 14:06

## 2020-03-26 RX ADMIN — Medication SCH MG: at 14:08

## 2020-03-26 RX ADMIN — MUPIROCIN SCH APPLIC: 20 OINTMENT TOPICAL at 08:08

## 2020-03-26 NOTE — NUR
ICU RN NOTES

PATIENT STABLE AOX1-2 Welsh SPEAKING ,  ABLE TO SPEAK AND UNDERSTAND A LITTLE ENGLISH  , 
NOT IN ACUTE DISTRESS , RESPIRATIONS EVEN AND UNLABORED WITH SPO2 % VIA NON REBREATHER 
MASK @ 15LPM  , SR 80 ON BEDSIDE MONITOR , R FEMORAL PICCLINE WITH NS @ 75ML/HR , AND NS AT 
TKO INFUSING WELL ,PIV IN PLACE , ALL NEEDS ATTENDED , BED ON LOW AND LOCKED POSITION , SIDE 
RAILS X2 CALL LIGHT WITHIN REACH, HOB @ 45 , REPORT GIVEN TO JAH FOR CONTINUITY OF CARE

## 2020-03-26 NOTE — NUR
ICU RN NOTES

PATIENT NOTED TO BE IN RESPIRATORY DISTRESS ON 5LPM NC SPO2 OF 83% , RR 35CPM , HR  BP 
179/115, AGITATED , APPLIED NON REBEATHER MASK @ 15LPM . RT ELENI AT BEDSIDE , AOX1-2 ABLE 
TO FOLLOW COMMANDS , WILL RE ASSESS

## 2020-03-26 NOTE — NUR
RECEIVED CALL FROM [facility] WITH RESULTS OF CHEST XRAY THAT 
FEEDING TUBE IS LOOPED IN GASTROESOPHAGEAL REGION AND RECOMMEND TO REPOSITION PATIENT READ 
BACK RESULTS AND CARRIED OUT.

## 2020-03-26 NOTE — NUR
ICU RN NOTES

PT STATUS IMPROVED , NOTED TO BE COMFORTABLE WITH NO SIGNS OF DISTRESS , ON 15LPM NON 
REBREATHER MASK WITH SPO2 %  , HR OF SR 92 , BP /80 , AFEBRILE , BILATERAL SOFT 
WRIST RESTRAINS APPLIED AS PT IS REMOVING MASK , WILL CONTINUE TO MONITOR

## 2020-03-26 NOTE — NUR
ICU RN NOTES

PT EXTUBATED, STABLE , ON5LPM NC SPO2 % , V/S STABLE , RESTRAINTS DISCONTINUED , WILL 
CONTINUE TO MONITOR

## 2020-03-26 NOTE — NUR
RT

PATIENT WEANED AND EXTUBATED PER DR IQBAL. PATIENT WAS PLACED ON 5L N/C BRITTANY WELL. CONT TO 
MONITOR CLOSELY. 

-------------------------------------------------------------------------------

Addendum: 03/26/20 at 1612 by ELENI KUMAR RT

-------------------------------------------------------------------------------

Amended: Links added.

## 2020-03-26 NOTE — NUR
ICU RN NOTES

ABG RESULT RELAYED TO DR IQBAL , RR OF 25-30 , TOLERATING SIMV , ALERT X1 ABLE TO FOLLOW 
COMMANDS  , MD AT BEDSIDE ASSESSING THE PT , VS STABLE , PER MD OK TO EXTUBATE

## 2020-03-26 NOTE — NUR
RT

PATIENT REC'D ORALLY INTUBATED ON [facility] VENT. ETT SECURE AND PATENT. VENT SETTINGS AND ALARMS 
CHECKED. YIU BAG AT HOB

-------------------------------------------------------------------------------

Addendum: 03/26/20 at 1612 by ELENI KUMAR RT

-------------------------------------------------------------------------------

Amended: Links added.

## 2020-03-26 NOTE — NUR
RT

PATIENT PLACED ON VENT WEANING MODE PER DR IQBAL. VENT ALARMS CHECKED + AUDIBLE. AMBU BAG AT 
HOB

-------------------------------------------------------------------------------

Addendum: 03/26/20 at 1612 by ELENI KUMAR RT

-------------------------------------------------------------------------------

Amended: Links added.

## 2020-03-26 NOTE — NUR
PATIENT REMAINS IN NO ACUTE DISTRESS IN BED. PATIENT DID NOT HAVE ANY SIGNIFICANT CHANGE IN 
CONDITION DURING SHIFT. ALL NEEDS MET, ALL ORDERS CARRIED OUT WILL ENDORSE CARE TO AM RN FOR 
CONTINUITY OF CARE.

## 2020-03-26 NOTE — NUR
ICU RN NOTES

RECEIVED PATIENT SEDATED , NOT IN ACUTE DISTRESS , RESPIRATIONS EVEN AND UNLABORED WITH SPO2 
% VIA MECHANICAL VENT SETTINGS AS ORDERED , ETT 7.5/22 IN PLACE , SR 75 ON BEDSIDE 
MONITOR , OGT PATENT AND INTACT CLAMPED , R FEMORAL PICCLINE WITH NS @ 75ML/HR , AND 
DIPRIVAN @ 40MCG/KG/MIN INFUSING WELL ,PIV IN PLACE , ALL NEEDS ATTENDED , BED ON LOW AND 
LOCKED POSITION , SIDE RAILS X2 CALL LIGHT WITHIN REACH, HOB @ 45 , WILL CONTINUE TO MONITOR

## 2020-03-27 VITALS — SYSTOLIC BLOOD PRESSURE: 135 MMHG | DIASTOLIC BLOOD PRESSURE: 71 MMHG

## 2020-03-27 VITALS — SYSTOLIC BLOOD PRESSURE: 141 MMHG | DIASTOLIC BLOOD PRESSURE: 78 MMHG

## 2020-03-27 VITALS — DIASTOLIC BLOOD PRESSURE: 67 MMHG | SYSTOLIC BLOOD PRESSURE: 141 MMHG

## 2020-03-27 VITALS — SYSTOLIC BLOOD PRESSURE: 153 MMHG | DIASTOLIC BLOOD PRESSURE: 81 MMHG

## 2020-03-27 VITALS — SYSTOLIC BLOOD PRESSURE: 143 MMHG | DIASTOLIC BLOOD PRESSURE: 66 MMHG

## 2020-03-27 VITALS — SYSTOLIC BLOOD PRESSURE: 122 MMHG | DIASTOLIC BLOOD PRESSURE: 76 MMHG

## 2020-03-27 VITALS — DIASTOLIC BLOOD PRESSURE: 119 MMHG | SYSTOLIC BLOOD PRESSURE: 162 MMHG

## 2020-03-27 VITALS — SYSTOLIC BLOOD PRESSURE: 146 MMHG | DIASTOLIC BLOOD PRESSURE: 70 MMHG

## 2020-03-27 VITALS — SYSTOLIC BLOOD PRESSURE: 138 MMHG | DIASTOLIC BLOOD PRESSURE: 78 MMHG

## 2020-03-27 VITALS — DIASTOLIC BLOOD PRESSURE: 80 MMHG | SYSTOLIC BLOOD PRESSURE: 130 MMHG

## 2020-03-27 VITALS — DIASTOLIC BLOOD PRESSURE: 73 MMHG | SYSTOLIC BLOOD PRESSURE: 136 MMHG

## 2020-03-27 VITALS — DIASTOLIC BLOOD PRESSURE: 76 MMHG | SYSTOLIC BLOOD PRESSURE: 139 MMHG

## 2020-03-27 VITALS — SYSTOLIC BLOOD PRESSURE: 146 MMHG | DIASTOLIC BLOOD PRESSURE: 74 MMHG

## 2020-03-27 VITALS — DIASTOLIC BLOOD PRESSURE: 79 MMHG | SYSTOLIC BLOOD PRESSURE: 146 MMHG

## 2020-03-27 VITALS — DIASTOLIC BLOOD PRESSURE: 71 MMHG | SYSTOLIC BLOOD PRESSURE: 140 MMHG

## 2020-03-27 VITALS — DIASTOLIC BLOOD PRESSURE: 78 MMHG | SYSTOLIC BLOOD PRESSURE: 134 MMHG

## 2020-03-27 VITALS — SYSTOLIC BLOOD PRESSURE: 137 MMHG | DIASTOLIC BLOOD PRESSURE: 73 MMHG

## 2020-03-27 VITALS — SYSTOLIC BLOOD PRESSURE: 133 MMHG | DIASTOLIC BLOOD PRESSURE: 62 MMHG

## 2020-03-27 VITALS — SYSTOLIC BLOOD PRESSURE: 154 MMHG | DIASTOLIC BLOOD PRESSURE: 81 MMHG

## 2020-03-27 VITALS — SYSTOLIC BLOOD PRESSURE: 147 MMHG | DIASTOLIC BLOOD PRESSURE: 76 MMHG

## 2020-03-27 VITALS — DIASTOLIC BLOOD PRESSURE: 79 MMHG | SYSTOLIC BLOOD PRESSURE: 142 MMHG

## 2020-03-27 VITALS — DIASTOLIC BLOOD PRESSURE: 75 MMHG | SYSTOLIC BLOOD PRESSURE: 136 MMHG

## 2020-03-27 VITALS — SYSTOLIC BLOOD PRESSURE: 134 MMHG | DIASTOLIC BLOOD PRESSURE: 67 MMHG

## 2020-03-27 VITALS — SYSTOLIC BLOOD PRESSURE: 143 MMHG | DIASTOLIC BLOOD PRESSURE: 78 MMHG

## 2020-03-27 LAB
BASOPHILS # BLD AUTO: 0 /CMM (ref 0–0.2)
BASOPHILS NFR BLD AUTO: 0.1 % (ref 0–2)
BUN SERPL-MCNC: 28 MG/DL (ref 7–18)
CALCIUM SERPL-MCNC: 7.8 MG/DL (ref 8.5–10.1)
CHLORIDE SERPL-SCNC: 115 MMOL/L (ref 98–107)
CO2 SERPL-SCNC: 30 MMOL/L (ref 21–32)
CREAT SERPL-MCNC: 0.7 MG/DL (ref 0.6–1.3)
EOSINOPHIL NFR BLD AUTO: 0 % (ref 0–6)
GLUCOSE SERPL-MCNC: 75 MG/DL (ref 74–106)
HCT VFR BLD AUTO: 25 % (ref 39–51)
HGB BLD-MCNC: 7.8 G/DL (ref 13.5–17.5)
LYMPHOCYTES NFR BLD AUTO: 0.9 /CMM (ref 0.8–4.8)
LYMPHOCYTES NFR BLD AUTO: 7.5 % (ref 20–44)
MAGNESIUM SERPL-MCNC: 2 MG/DL (ref 1.8–2.4)
MCHC RBC AUTO-ENTMCNC: 31 G/DL (ref 31–36)
MCV RBC AUTO: 92 FL (ref 80–96)
MONOCYTES NFR BLD AUTO: 0.6 /CMM (ref 0.1–1.3)
MONOCYTES NFR BLD AUTO: 5 % (ref 2–12)
NEUTROPHILS # BLD AUTO: 10.5 /CMM (ref 1.8–8.9)
NEUTROPHILS NFR BLD AUTO: 87.4 % (ref 43–81)
PLATELET # BLD AUTO: 130 /CMM (ref 150–450)
POTASSIUM SERPL-SCNC: 3.8 MMOL/L (ref 3.5–5.1)
RBC # BLD AUTO: 2.76 MIL/UL (ref 4.5–6)
SODIUM SERPL-SCNC: 149 MMOL/L (ref 136–145)
WBC NRBC COR # BLD AUTO: 12.1 K/UL (ref 4.3–11)

## 2020-03-27 RX ADMIN — SODIUM CHLORIDE SCH MLS/HR: 9 INJECTION, SOLUTION INTRAVENOUS at 14:16

## 2020-03-27 RX ADMIN — MUPIROCIN SCH APPLIC: 20 OINTMENT TOPICAL at 16:28

## 2020-03-27 RX ADMIN — ALBUTEROL SULFATE SCH PUFF: 90 AEROSOL, METERED RESPIRATORY (INHALATION) at 11:58

## 2020-03-27 RX ADMIN — HYDROCORTISONE SODIUM SUCCINATE SCH MG: 100 INJECTION, POWDER, FOR SOLUTION INTRAMUSCULAR; INTRAVASCULAR at 16:28

## 2020-03-27 RX ADMIN — MEROPENEM SCH MLS/HR: 1 INJECTION INTRAVENOUS at 21:00

## 2020-03-27 RX ADMIN — DEXTROSE MONOHYDRATE SCH MLS/HR: 50 INJECTION, SOLUTION INTRAVENOUS at 03:30

## 2020-03-27 RX ADMIN — Medication SCH OZ: at 08:09

## 2020-03-27 RX ADMIN — HYDROCORTISONE SODIUM SUCCINATE SCH MG: 100 INJECTION, POWDER, FOR SOLUTION INTRAMUSCULAR; INTRAVASCULAR at 08:08

## 2020-03-27 RX ADMIN — SODIUM CHLORIDE SCH MG: 9 INJECTION, SOLUTION INTRAVENOUS at 08:08

## 2020-03-27 RX ADMIN — MEROPENEM SCH MLS/HR: 1 INJECTION INTRAVENOUS at 08:08

## 2020-03-27 RX ADMIN — SODIUM CHLORIDE PRN MLS/HR: 9 INJECTION, SOLUTION INTRAVENOUS at 03:30

## 2020-03-27 RX ADMIN — HYDROCORTISONE SODIUM SUCCINATE SCH MG: 100 INJECTION, POWDER, FOR SOLUTION INTRAMUSCULAR; INTRAVASCULAR at 12:00

## 2020-03-27 RX ADMIN — DEXTROSE MONOHYDRATE SCH MLS/HR: 50 INJECTION, SOLUTION INTRAVENOUS at 16:28

## 2020-03-27 RX ADMIN — ALBUTEROL SULFATE SCH GM: 90 AEROSOL, METERED RESPIRATORY (INHALATION) at 06:16

## 2020-03-27 RX ADMIN — ALBUTEROL SULFATE SCH PUFF: 90 AEROSOL, METERED RESPIRATORY (INHALATION) at 17:28

## 2020-03-27 RX ADMIN — DEXTROSE MONOHYDRATE SCH MLS/HR: 50 INJECTION, SOLUTION INTRAVENOUS at 17:28

## 2020-03-27 RX ADMIN — SODIUM CHLORIDE PRN MLS/HR: 9 INJECTION, SOLUTION INTRAVENOUS at 16:01

## 2020-03-27 RX ADMIN — ALBUTEROL SULFATE SCH GM: 90 AEROSOL, METERED RESPIRATORY (INHALATION) at 00:12

## 2020-03-27 RX ADMIN — ENOXAPARIN SODIUM SCH MG: 40 INJECTION SUBCUTANEOUS at 09:12

## 2020-03-27 RX ADMIN — MUPIROCIN SCH APPLIC: 20 OINTMENT TOPICAL at 08:09

## 2020-03-27 NOTE — NUR
ICU RN NOTES

PLACED PT ON SIMPLE MASK @ 8LPM , SPO2 % NO SIGNS OF DISTRESS , TOLERATING WELL AT 
THIS TIME , MD AWARE HIGH FLOW NC IS NOT AVAILABLE AT THIS TIME , WILL CONTINUE TO MONITOR

## 2020-03-27 NOTE — NUR
ICU RN NOTES

PATIENT NOTED TO BE IN RESPIRATORY DISTRESS ON 5LPM NC SPO2 OF 87% , -35CPM , HR ST 
120-125 BP  AGITATED , COMPLAINING THAT HE IS SHORT OF BREATH , APPLIED NON REBREATHER MASK 
@ 15LPM . PATIENT AOX2 , WILL RE ASSESS

## 2020-03-27 NOTE — NUR
ICU RN NOTES

PT IMPROVED AFTER PLACING HIM @ NON REBREATHER MASK @ 15LPM WITH SPO2 % , HR SR 90'S , 
RR 18-20 , BP WNL , DENIES ANY SOB AND DISCOMFORT AT THIS TIME . WILL CONTINUE TO MONITOR

## 2020-03-27 NOTE — NUR
Patient with BM X1 pasty brownish reddish.Perineal care and bed bath rendered.Complete

linens changed.PICC line dressing soiled.Changed dressing under aseptic technique.Turned

and repositioned.HOB elevated.

## 2020-03-27 NOTE — NUR
ICU RN NOTES.



Patient removing simple mask multiple times and desaturating to 80's.Bilateral soft wrist 
restraints applied.

## 2020-03-27 NOTE — NUR
ICU RN NOTES



Received patient awake alert and oriented 2-3.Upper sorbian speaking but understand and speaks 
little 

English.Respiration even and unlabored with O2 8L via simple mask.Tolerating well spo2 100%.

VSS.SR per monitor.NPO status with maintenance IVF NS infusing via R Femoral PICC Line.Site 

intact.FC to gravity drainage.Turned & repositioned.Safety precaution maintained.Bed low 
locked

and side rails up x2.Denies pain or any discomfort.Continue monitoring.

## 2020-03-27 NOTE — NUR
ICU RN NOTES

RECEIVED PATIENT AOX1-2 Slovenian SPEAKING ,  ABLE TO SPEAK AND UNDERSTAND A LITTLE ENGLISH  , 
NOT IN ACUTE DISTRESS , RESPIRATIONS EVEN AND UNLABORED WITH SPO2 % VIA 5LPM NC   , SR 
90 ON BEDSIDE MONITOR , R FEMORAL PICCLINE WITH NS @ 75ML/HR , AND NS AT TKO INFUSING WELL 
,PIV IN PLACE , ALL NEEDS ATTENDED , BED ON LOW AND LOCKED POSITION , SIDE RAILS X2 CALL 
LIGHT WITHIN REACH, HOB @ 45 , WILL CONTINUE TO MONITOR

## 2020-03-27 NOTE — NUR
ICU RN NOTES

SEEN AND EVALUATED BY DR MATUET , DISCUSSED PT LABS , S/P EXTUBATION YESTERDAY CURRENTLY 
ON 15LPM NON REBREATHER MASK , TITRATED TO 6LPM NC PT DIDN'T TOLERATE IT , AOX1-2 , NOTED 
WITH MUCOID DARK TARRY STOOL X1 , PER MD PT HAS IT LAST ADMISSION ADVISE TO MONITOR IT . 
PENDING SWALLOW EVAL , F/U MEDICATION RECONCILIATION . MD  AWARE

## 2020-03-27 NOTE — NUR
ICU RN NOTES

Patient in and out of Afib 150's-160  when agitated and back to NSR when resting.

Dr.Sam Verdin notified with orders and carried out.

## 2020-03-27 NOTE — NUR
ICU RN NOTES

PLACED PT BACK ON 15LPM NON RE BREATHER MASK AS PT COMPLAIN OF SOB , -110 , TACYPNEIC 
@ 30-35CPM , CALLED RT KNOWLES FOR HIGH FLOW O2 ORDER PER MD .

## 2020-03-27 NOTE — NUR
ICU RN NOTES

NOTIFIED DR IQBAL THAT PT IS BACK ON NON REBREATHER MASK AS PT DEVELOPED SOB , TACHYCARDIC 
AND TACHYPNEIC , PER MD PUT PT ON HIGH FLOW O2 , CALLED RT FOR HIGH O2 ORDER

## 2020-03-27 NOTE — NUR
ICU RN NOTES

RT AT BEDSIDE , PLACED PT ON 6LPN NC SPO2 % , TOLERATING WELL AT THIS TIME , NO SIGNS 
OF SOB OR DISTRESS , VS STABLE , WILL CONTINUE TO MONITOR

## 2020-03-27 NOTE — NUR
ICU RN NOTES

PATIENT STABLE RESTING ON BED ,  AOX1-2 Khmer SPEAKING ,  ABLE TO SPEAK AND UNDERSTAND A 
LITTLE ENGLISH  , NOT IN ACUTE DISTRESS , RESPIRATIONS EVEN AND UNLABORED WITH SPO2 % 
VIA 8LPM SIMPLE MASK   , SR 85 ON BEDSIDE MONITOR , R FEMORAL PICCLINE WITH NS @ 75ML/HR , 
AND NS AT TKO INFUSING WELL , ALL NEEDS ATTENDED , BED ON LOW AND LOCKED POSITION , SIDE 
RAILS X2 CALL LIGHT WITHIN REACH, HOB @ 45 , REPORT GIVEN TO CHRISTELLE FOR CONTINUITY OF CARE

## 2020-03-28 VITALS — SYSTOLIC BLOOD PRESSURE: 167 MMHG | DIASTOLIC BLOOD PRESSURE: 89 MMHG

## 2020-03-28 VITALS — SYSTOLIC BLOOD PRESSURE: 137 MMHG | DIASTOLIC BLOOD PRESSURE: 60 MMHG

## 2020-03-28 VITALS — DIASTOLIC BLOOD PRESSURE: 53 MMHG | SYSTOLIC BLOOD PRESSURE: 124 MMHG

## 2020-03-28 VITALS — SYSTOLIC BLOOD PRESSURE: 150 MMHG | DIASTOLIC BLOOD PRESSURE: 73 MMHG

## 2020-03-28 VITALS — SYSTOLIC BLOOD PRESSURE: 135 MMHG | DIASTOLIC BLOOD PRESSURE: 87 MMHG

## 2020-03-28 VITALS — DIASTOLIC BLOOD PRESSURE: 89 MMHG | SYSTOLIC BLOOD PRESSURE: 162 MMHG

## 2020-03-28 VITALS — SYSTOLIC BLOOD PRESSURE: 130 MMHG | DIASTOLIC BLOOD PRESSURE: 87 MMHG

## 2020-03-28 VITALS — SYSTOLIC BLOOD PRESSURE: 120 MMHG | DIASTOLIC BLOOD PRESSURE: 66 MMHG

## 2020-03-28 VITALS — SYSTOLIC BLOOD PRESSURE: 129 MMHG | DIASTOLIC BLOOD PRESSURE: 67 MMHG

## 2020-03-28 VITALS — DIASTOLIC BLOOD PRESSURE: 70 MMHG | SYSTOLIC BLOOD PRESSURE: 141 MMHG

## 2020-03-28 VITALS — SYSTOLIC BLOOD PRESSURE: 142 MMHG | DIASTOLIC BLOOD PRESSURE: 65 MMHG

## 2020-03-28 VITALS — DIASTOLIC BLOOD PRESSURE: 74 MMHG | SYSTOLIC BLOOD PRESSURE: 148 MMHG

## 2020-03-28 VITALS — SYSTOLIC BLOOD PRESSURE: 128 MMHG | DIASTOLIC BLOOD PRESSURE: 65 MMHG

## 2020-03-28 VITALS — DIASTOLIC BLOOD PRESSURE: 63 MMHG | SYSTOLIC BLOOD PRESSURE: 140 MMHG

## 2020-03-28 VITALS — DIASTOLIC BLOOD PRESSURE: 87 MMHG | SYSTOLIC BLOOD PRESSURE: 162 MMHG

## 2020-03-28 VITALS — DIASTOLIC BLOOD PRESSURE: 53 MMHG | SYSTOLIC BLOOD PRESSURE: 126 MMHG

## 2020-03-28 VITALS — SYSTOLIC BLOOD PRESSURE: 121 MMHG | DIASTOLIC BLOOD PRESSURE: 53 MMHG

## 2020-03-28 VITALS — SYSTOLIC BLOOD PRESSURE: 161 MMHG | DIASTOLIC BLOOD PRESSURE: 100 MMHG

## 2020-03-28 VITALS — DIASTOLIC BLOOD PRESSURE: 62 MMHG | SYSTOLIC BLOOD PRESSURE: 124 MMHG

## 2020-03-28 VITALS — DIASTOLIC BLOOD PRESSURE: 97 MMHG | SYSTOLIC BLOOD PRESSURE: 175 MMHG

## 2020-03-28 VITALS — DIASTOLIC BLOOD PRESSURE: 87 MMHG | SYSTOLIC BLOOD PRESSURE: 135 MMHG

## 2020-03-28 VITALS — SYSTOLIC BLOOD PRESSURE: 138 MMHG | DIASTOLIC BLOOD PRESSURE: 71 MMHG

## 2020-03-28 VITALS — SYSTOLIC BLOOD PRESSURE: 103 MMHG | DIASTOLIC BLOOD PRESSURE: 54 MMHG

## 2020-03-28 VITALS — DIASTOLIC BLOOD PRESSURE: 83 MMHG | SYSTOLIC BLOOD PRESSURE: 174 MMHG

## 2020-03-28 LAB
BASOPHILS # BLD AUTO: 0 /CMM (ref 0–0.2)
BASOPHILS NFR BLD AUTO: 0.1 % (ref 0–2)
BUN SERPL-MCNC: 23 MG/DL (ref 7–18)
CALCIUM SERPL-MCNC: 7.6 MG/DL (ref 8.5–10.1)
CHLORIDE SERPL-SCNC: 110 MMOL/L (ref 98–107)
CO2 SERPL-SCNC: 29 MMOL/L (ref 21–32)
CREAT SERPL-MCNC: 0.7 MG/DL (ref 0.6–1.3)
EOSINOPHIL NFR BLD AUTO: 0 % (ref 0–6)
GLUCOSE SERPL-MCNC: 161 MG/DL (ref 74–106)
HCT VFR BLD AUTO: 24 % (ref 39–51)
HGB BLD-MCNC: 7.2 G/DL (ref 13.5–17.5)
LYMPHOCYTES NFR BLD AUTO: 0.3 /CMM (ref 0.8–4.8)
LYMPHOCYTES NFR BLD AUTO: 2.3 % (ref 20–44)
LYMPHOCYTES NFR BLD MANUAL: 4 % (ref 16–48)
MAGNESIUM SERPL-MCNC: 1.8 MG/DL (ref 1.8–2.4)
MCHC RBC AUTO-ENTMCNC: 30 G/DL (ref 31–36)
MCV RBC AUTO: 91 FL (ref 80–96)
METAMYELOCYTES NFR BLD MANUAL: 2 % (ref 0–0)
MONOCYTES NFR BLD AUTO: 0.5 /CMM (ref 0.1–1.3)
MONOCYTES NFR BLD AUTO: 3.5 % (ref 2–12)
MONOCYTES NFR BLD MANUAL: 4 % (ref 0–11)
MYELOCYTES NFR BLD MANUAL: 1 % (ref 0–0)
NEUTROPHILS # BLD AUTO: 14.1 /CMM (ref 1.8–8.9)
NEUTROPHILS NFR BLD AUTO: 94.1 % (ref 43–81)
NEUTS BAND NFR BLD MANUAL: 9 % (ref 0–5)
NEUTS SEG NFR BLD MANUAL: 80 % (ref 42–76)
PLATELET # BLD AUTO: 127 /CMM (ref 150–450)
POTASSIUM SERPL-SCNC: 3.1 MMOL/L (ref 3.5–5.1)
RBC # BLD AUTO: 2.61 MIL/UL (ref 4.5–6)
SODIUM SERPL-SCNC: 146 MMOL/L (ref 136–145)
WBC NRBC COR # BLD AUTO: 15 K/UL (ref 4.3–11)

## 2020-03-28 RX ADMIN — ACETYLCYSTEINE SCH MG: 100 INHALANT RESPIRATORY (INHALATION) at 15:30

## 2020-03-28 RX ADMIN — ALBUTEROL SULFATE SCH PUFF: 90 AEROSOL, METERED RESPIRATORY (INHALATION) at 12:37

## 2020-03-28 RX ADMIN — DIGOXIN SCH MG: 250 INJECTION, SOLUTION INTRAMUSCULAR; INTRAVENOUS at 23:55

## 2020-03-28 RX ADMIN — POTASSIUM CHLORIDE SCH MLS/HR: 200 INJECTION, SOLUTION INTRAVENOUS at 12:37

## 2020-03-28 RX ADMIN — ALBUTEROL SULFATE SCH PUFF: 90 AEROSOL, METERED RESPIRATORY (INHALATION) at 17:02

## 2020-03-28 RX ADMIN — ALBUTEROL SULFATE SCH PUFF: 90 AEROSOL, METERED RESPIRATORY (INHALATION) at 23:57

## 2020-03-28 RX ADMIN — HYDROCORTISONE SODIUM SUCCINATE SCH MG: 100 INJECTION, POWDER, FOR SOLUTION INTRAMUSCULAR; INTRAVASCULAR at 08:19

## 2020-03-28 RX ADMIN — DIGOXIN SCH MG: 250 INJECTION, SOLUTION INTRAMUSCULAR; INTRAVENOUS at 17:03

## 2020-03-28 RX ADMIN — ACETYLCYSTEINE SCH MG: 100 INHALANT RESPIRATORY (INHALATION) at 23:22

## 2020-03-28 RX ADMIN — ALBUTEROL SULFATE SCH PUFF: 90 AEROSOL, METERED RESPIRATORY (INHALATION) at 06:05

## 2020-03-28 RX ADMIN — SODIUM CHLORIDE SCH MG: 9 INJECTION, SOLUTION INTRAVENOUS at 08:18

## 2020-03-28 RX ADMIN — MEROPENEM SCH MLS/HR: 1 INJECTION INTRAVENOUS at 21:00

## 2020-03-28 RX ADMIN — SODIUM CHLORIDE PRN MLS/HR: 9 INJECTION, SOLUTION INTRAVENOUS at 20:00

## 2020-03-28 RX ADMIN — ENOXAPARIN SODIUM SCH MG: 40 INJECTION SUBCUTANEOUS at 08:19

## 2020-03-28 RX ADMIN — DEXTROSE MONOHYDRATE SCH MLS/HR: 50 INJECTION, SOLUTION INTRAVENOUS at 15:21

## 2020-03-28 RX ADMIN — MUPIROCIN SCH APPLIC: 20 OINTMENT TOPICAL at 08:20

## 2020-03-28 RX ADMIN — HYDROCORTISONE SODIUM SUCCINATE SCH MG: 100 INJECTION, POWDER, FOR SOLUTION INTRAMUSCULAR; INTRAVASCULAR at 16:50

## 2020-03-28 RX ADMIN — POTASSIUM CHLORIDE SCH MLS/HR: 200 INJECTION, SOLUTION INTRAVENOUS at 09:41

## 2020-03-28 RX ADMIN — Medication SCH OZ: at 08:20

## 2020-03-28 RX ADMIN — DEXTROSE MONOHYDRATE SCH MLS/HR: 50 INJECTION, SOLUTION INTRAVENOUS at 02:49

## 2020-03-28 RX ADMIN — MUPIROCIN SCH APPLIC: 20 OINTMENT TOPICAL at 16:50

## 2020-03-28 RX ADMIN — POTASSIUM CHLORIDE SCH MLS/HR: 200 INJECTION, SOLUTION INTRAVENOUS at 11:03

## 2020-03-28 RX ADMIN — DIGOXIN SCH MG: 250 INJECTION, SOLUTION INTRAMUSCULAR; INTRAVENOUS at 12:40

## 2020-03-28 RX ADMIN — ALBUTEROL SULFATE SCH PUFF: 90 AEROSOL, METERED RESPIRATORY (INHALATION) at 00:00

## 2020-03-28 RX ADMIN — SODIUM CHLORIDE SCH MLS/HR: 9 INJECTION, SOLUTION INTRAVENOUS at 14:33

## 2020-03-28 RX ADMIN — ACETYLCYSTEINE SCH MG: 100 INHALANT RESPIRATORY (INHALATION) at 12:30

## 2020-03-28 RX ADMIN — SODIUM CHLORIDE PRN MLS/HR: 9 INJECTION, SOLUTION INTRAVENOUS at 05:22

## 2020-03-28 RX ADMIN — HYDROCORTISONE SODIUM SUCCINATE SCH MG: 100 INJECTION, POWDER, FOR SOLUTION INTRAMUSCULAR; INTRAVASCULAR at 12:40

## 2020-03-28 RX ADMIN — POTASSIUM CHLORIDE SCH MLS/HR: 200 INJECTION, SOLUTION INTRAVENOUS at 11:49

## 2020-03-28 RX ADMIN — MEROPENEM SCH MLS/HR: 1 INJECTION INTRAVENOUS at 08:18

## 2020-03-28 NOTE — NUR
Received patient a/o x1-2 very agitated yelling removing simple mask and desaturating 

to 80's.Very uncooperative bilateral soft wrist restraints applied.HOB elevated..

BP elevated.NPO status with maintenance iv infusing to R femoral PICC line site intact.

FC to gravity.BM X1 small amount coffee ground.Kept clean and dry.Turned and repositioned.

Safety measures implemented with bed low locked side rails up x 3.Call light within easy 

reach and frequent rounding.Continue monitoring.

## 2020-03-28 NOTE — NUR
ICU RN NOTES



Patient with on and off Afib with 's-160's-170's non sustaining and back to NSR 
70's-80's.

Troponin 1.161 and patient on Lovenox.BM colored brownish reddish.Patient denies chest pain 

or sob.Appears comfortable.EKG NSR with RBBB. notified.No new orders received.

## 2020-03-28 NOTE — NUR
received pt from night shift, alert, follows commands, uncooperative on times, SR, off of 
amio per Dr Johnson, on simple mask at 8L, sat well, non pitting edema all extremities, lungs 
congested, NPO, f/c OK output, v/s stable, no pain, pt turned and repositioned.

## 2020-03-28 NOTE — NUR
Patient tele AFIB with 's-190's called to  with orders.

Amiodarone bolus followed by gtt started per protocol.Denies chest pain.

Continue to monitor.

## 2020-03-28 NOTE — NUR
RT NOTE:

RT CALLED TO NT SUCTION PATIENT. OBTAINED LARGE AMOUNT OF THICK TAN/LIGHT GREEN SECRETIONS. 
PATIENT TOLERATED WELL. CHARGE NURSE(SANGEETHA) AWARE.

## 2020-03-28 NOTE — NUR
Patient resting in no acute distress.AM care done.BM X2 during the shift pasty brownish 
reddish.

Due medications administered,VSS.IVF and Amiodarone gtt infusing well.Turned and 
repositioned.

Patient get agitated occasionally.Safety measures maintained.

## 2020-03-28 NOTE — NUR
pt is resting in the bed, alert, follows command, ST, SR, on simple mask at 8L, sat well, 
NPO, f/c OK output, 2 BMs, v/s stable, no pain, pt cleaned, changed and repositioned.

## 2020-03-29 VITALS — SYSTOLIC BLOOD PRESSURE: 169 MMHG

## 2020-03-29 VITALS — DIASTOLIC BLOOD PRESSURE: 95 MMHG | SYSTOLIC BLOOD PRESSURE: 152 MMHG

## 2020-03-29 VITALS — DIASTOLIC BLOOD PRESSURE: 85 MMHG | SYSTOLIC BLOOD PRESSURE: 163 MMHG

## 2020-03-29 VITALS — SYSTOLIC BLOOD PRESSURE: 157 MMHG | DIASTOLIC BLOOD PRESSURE: 91 MMHG

## 2020-03-29 VITALS — DIASTOLIC BLOOD PRESSURE: 78 MMHG | SYSTOLIC BLOOD PRESSURE: 142 MMHG

## 2020-03-29 VITALS — SYSTOLIC BLOOD PRESSURE: 163 MMHG | DIASTOLIC BLOOD PRESSURE: 87 MMHG

## 2020-03-29 VITALS — SYSTOLIC BLOOD PRESSURE: 145 MMHG | DIASTOLIC BLOOD PRESSURE: 95 MMHG

## 2020-03-29 VITALS — DIASTOLIC BLOOD PRESSURE: 76 MMHG | SYSTOLIC BLOOD PRESSURE: 148 MMHG

## 2020-03-29 VITALS — SYSTOLIC BLOOD PRESSURE: 126 MMHG | DIASTOLIC BLOOD PRESSURE: 46 MMHG

## 2020-03-29 VITALS — SYSTOLIC BLOOD PRESSURE: 120 MMHG | DIASTOLIC BLOOD PRESSURE: 63 MMHG

## 2020-03-29 VITALS — DIASTOLIC BLOOD PRESSURE: 90 MMHG | SYSTOLIC BLOOD PRESSURE: 160 MMHG

## 2020-03-29 VITALS — SYSTOLIC BLOOD PRESSURE: 166 MMHG | DIASTOLIC BLOOD PRESSURE: 93 MMHG

## 2020-03-29 VITALS — DIASTOLIC BLOOD PRESSURE: 68 MMHG | SYSTOLIC BLOOD PRESSURE: 156 MMHG

## 2020-03-29 VITALS — DIASTOLIC BLOOD PRESSURE: 93 MMHG | SYSTOLIC BLOOD PRESSURE: 166 MMHG

## 2020-03-29 VITALS — SYSTOLIC BLOOD PRESSURE: 162 MMHG | DIASTOLIC BLOOD PRESSURE: 81 MMHG

## 2020-03-29 VITALS — DIASTOLIC BLOOD PRESSURE: 80 MMHG | SYSTOLIC BLOOD PRESSURE: 159 MMHG

## 2020-03-29 VITALS — DIASTOLIC BLOOD PRESSURE: 71 MMHG | SYSTOLIC BLOOD PRESSURE: 154 MMHG

## 2020-03-29 VITALS — DIASTOLIC BLOOD PRESSURE: 69 MMHG | SYSTOLIC BLOOD PRESSURE: 128 MMHG

## 2020-03-29 VITALS — SYSTOLIC BLOOD PRESSURE: 154 MMHG | DIASTOLIC BLOOD PRESSURE: 90 MMHG

## 2020-03-29 VITALS — DIASTOLIC BLOOD PRESSURE: 90 MMHG | SYSTOLIC BLOOD PRESSURE: 153 MMHG

## 2020-03-29 VITALS — DIASTOLIC BLOOD PRESSURE: 86 MMHG | SYSTOLIC BLOOD PRESSURE: 114 MMHG

## 2020-03-29 VITALS — DIASTOLIC BLOOD PRESSURE: 95 MMHG | SYSTOLIC BLOOD PRESSURE: 165 MMHG

## 2020-03-29 VITALS — DIASTOLIC BLOOD PRESSURE: 95 MMHG | SYSTOLIC BLOOD PRESSURE: 159 MMHG

## 2020-03-29 VITALS — SYSTOLIC BLOOD PRESSURE: 146 MMHG | DIASTOLIC BLOOD PRESSURE: 85 MMHG

## 2020-03-29 VITALS — SYSTOLIC BLOOD PRESSURE: 129 MMHG | DIASTOLIC BLOOD PRESSURE: 94 MMHG

## 2020-03-29 LAB
BASE EXCESS BLDA CALC-SCNC: -3.7 MMOL/L
BASOPHILS # BLD AUTO: 0 /CMM (ref 0–0.2)
BASOPHILS NFR BLD AUTO: 0.1 % (ref 0–2)
BUN SERPL-MCNC: 20 MG/DL (ref 7–18)
CALCIUM SERPL-MCNC: 7.9 MG/DL (ref 8.5–10.1)
CHLORIDE SERPL-SCNC: 111 MMOL/L (ref 98–107)
CO2 SERPL-SCNC: 30 MMOL/L (ref 21–32)
CREAT SERPL-MCNC: 0.7 MG/DL (ref 0.6–1.3)
DO-HGB MFR BLDA: 177.4 MMHG
EOSINOPHIL NFR BLD AUTO: 0 % (ref 0–6)
GLUCOSE SERPL-MCNC: 97 MG/DL (ref 74–106)
HCT VFR BLD AUTO: 24 % (ref 39–51)
HGB BLD-MCNC: 7.2 G/DL (ref 13.5–17.5)
INHALED O2 CONCENTRATION: 40 %
INHALED O2 FLOW RATE: 12 L/MIN (ref 0–30)
LYMPHOCYTES NFR BLD AUTO: 0.9 /CMM (ref 0.8–4.8)
LYMPHOCYTES NFR BLD AUTO: 6.3 % (ref 20–44)
MAGNESIUM SERPL-MCNC: 1.8 MG/DL (ref 1.8–2.4)
MCHC RBC AUTO-ENTMCNC: 31 G/DL (ref 31–36)
MCV RBC AUTO: 91 FL (ref 80–96)
MONOCYTES NFR BLD AUTO: 0.6 /CMM (ref 0.1–1.3)
MONOCYTES NFR BLD AUTO: 4.3 % (ref 2–12)
NEUTROPHILS # BLD AUTO: 13.2 /CMM (ref 1.8–8.9)
NEUTROPHILS NFR BLD AUTO: 89.3 % (ref 43–81)
PCO2 TEMP ADJ BLDA: 38.1 MMHG (ref 35–45)
PH TEMP ADJ BLDA: 7.37 [PH] (ref 7.35–7.45)
PLATELET # BLD AUTO: 144 /CMM (ref 150–450)
PO2 TEMP ADJ BLDA: 64 MMHG (ref 75–100)
POTASSIUM SERPL-SCNC: 3.6 MMOL/L (ref 3.5–5.1)
RBC # BLD AUTO: 2.59 MIL/UL (ref 4.5–6)
SAO2 % BLDA: 91.3 % (ref 92–98.5)
SODIUM SERPL-SCNC: 146 MMOL/L (ref 136–145)
VENTILATION MODE VENT: (no result)
WBC NRBC COR # BLD AUTO: 14.8 K/UL (ref 4.3–11)

## 2020-03-29 RX ADMIN — HYDROCORTISONE SODIUM SUCCINATE SCH MG: 100 INJECTION, POWDER, FOR SOLUTION INTRAMUSCULAR; INTRAVASCULAR at 08:35

## 2020-03-29 RX ADMIN — HYDROCORTISONE SODIUM SUCCINATE SCH MG: 100 INJECTION, POWDER, FOR SOLUTION INTRAMUSCULAR; INTRAVASCULAR at 12:01

## 2020-03-29 RX ADMIN — MUPIROCIN SCH APPLIC: 20 OINTMENT TOPICAL at 08:36

## 2020-03-29 RX ADMIN — ALBUTEROL SULFATE SCH PUFF: 90 AEROSOL, METERED RESPIRATORY (INHALATION) at 23:49

## 2020-03-29 RX ADMIN — MEROPENEM SCH MLS/HR: 1 INJECTION INTRAVENOUS at 08:36

## 2020-03-29 RX ADMIN — SODIUM CHLORIDE SCH MG: 9 INJECTION, SOLUTION INTRAVENOUS at 16:15

## 2020-03-29 RX ADMIN — ACETYLCYSTEINE SCH MG: 100 INHALANT RESPIRATORY (INHALATION) at 15:21

## 2020-03-29 RX ADMIN — Medication SCH OZ: at 08:36

## 2020-03-29 RX ADMIN — SODIUM CHLORIDE PRN MLS/HR: 9 INJECTION, SOLUTION INTRAVENOUS at 08:36

## 2020-03-29 RX ADMIN — ALBUTEROL SULFATE SCH PUFF: 90 AEROSOL, METERED RESPIRATORY (INHALATION) at 17:18

## 2020-03-29 RX ADMIN — HYDROCORTISONE SODIUM SUCCINATE SCH MG: 100 INJECTION, POWDER, FOR SOLUTION INTRAMUSCULAR; INTRAVASCULAR at 16:15

## 2020-03-29 RX ADMIN — ACETYLCYSTEINE SCH MG: 100 INHALANT RESPIRATORY (INHALATION) at 22:44

## 2020-03-29 RX ADMIN — ALBUTEROL SULFATE SCH PUFF: 90 AEROSOL, METERED RESPIRATORY (INHALATION) at 05:47

## 2020-03-29 RX ADMIN — MUPIROCIN SCH APPLIC: 20 OINTMENT TOPICAL at 16:15

## 2020-03-29 RX ADMIN — ALBUTEROL SULFATE SCH PUFF: 90 AEROSOL, METERED RESPIRATORY (INHALATION) at 12:02

## 2020-03-29 RX ADMIN — ACETYLCYSTEINE SCH MG: 100 INHALANT RESPIRATORY (INHALATION) at 08:09

## 2020-03-29 RX ADMIN — DEXTROSE MONOHYDRATE SCH MLS/HR: 50 INJECTION, SOLUTION INTRAVENOUS at 14:46

## 2020-03-29 RX ADMIN — DEXTROSE MONOHYDRATE SCH MLS/HR: 50 INJECTION, SOLUTION INTRAVENOUS at 02:24

## 2020-03-29 RX ADMIN — SODIUM CHLORIDE SCH MG: 9 INJECTION, SOLUTION INTRAVENOUS at 08:36

## 2020-03-29 RX ADMIN — SODIUM CHLORIDE SCH MLS/HR: 9 INJECTION, SOLUTION INTRAVENOUS at 14:46

## 2020-03-29 RX ADMIN — MEROPENEM SCH MLS/HR: 1 INJECTION INTRAVENOUS at 20:13

## 2020-03-29 NOTE — NUR
RN NOTES



1000 SEEN AND EXAMINED BBY DR IQBAL. MD AWARE OF LAB VALUES, CXR AND ABG RESULT. PT ON 
VENTURI MASK AT 15LPM. KEPT HOB ELEVATED. NO RESPIRATORY DISTRESS NOTED. WILL CLOSELY 
MONITOR



1030 SEEN AND EXAMINED BY DR MATUTE. PT A/OX1-2. ON VENTURI MASK. KEPT HOB ELEVATED. NO 
RESPIRATORY DISTRESS NOTED. DENIES ANY PAIN. IVF INFUSING. AFEBRILE. AWARE OF LAB VALUES AND 
IMAGING RESULT. WILL CONTINUE TO MONITOR

## 2020-03-29 NOTE — NUR
Patient awake.VSS.Hygienic measures done.Turned and repositioned.Patient desaturate

to 80's even at rest.Encouraged to cough and deep breath.Due medications administered.

Continue to monitor.

## 2020-03-29 NOTE — NUR
RN CLOSING NOTES



PT ON VENTURI MASK. NO RESPIRATORY DISTRESS NOTED. NO SOB NOTED. KEPT COMFORTABLE. IVF 
INFUSING. TX PROVIDED AS ORDERED. KEPT CLEAN AND DRY. REPOSITIONED Q2. BLE ELEVATED. WILL 
ENDORSE FOR CONTINUITY OF CARE.

## 2020-03-29 NOTE — NUR
Patient in no acute distress.Tachycardic 102.Denies pain.IV's infusing well.

Moderate urine output.Turned and repositioned.Report given to day shift RN

For TREVOR.

## 2020-03-29 NOTE — NUR
RN INITIAL NOTES



RECEIVED PT AWAKE, A/OX1-2. ON 02 VIA MASK AT 8LPM. HOB ELEVATED. NO RESPIRATORY DISTRESS 
NOTED. NO SIGNS OF PAIN NOTED. IVF INFUSING. FC IN PLACE. NO HEMATURIA NOTED. PT 
REPOSITIONED, COMFORTABLE. WILL CONTINUE TO MONITOR

## 2020-03-30 VITALS — DIASTOLIC BLOOD PRESSURE: 98 MMHG | SYSTOLIC BLOOD PRESSURE: 160 MMHG

## 2020-03-30 VITALS — DIASTOLIC BLOOD PRESSURE: 51 MMHG | SYSTOLIC BLOOD PRESSURE: 67 MMHG

## 2020-03-30 VITALS — SYSTOLIC BLOOD PRESSURE: 150 MMHG | DIASTOLIC BLOOD PRESSURE: 38 MMHG

## 2020-03-30 VITALS — DIASTOLIC BLOOD PRESSURE: 68 MMHG | SYSTOLIC BLOOD PRESSURE: 160 MMHG

## 2020-03-30 VITALS — SYSTOLIC BLOOD PRESSURE: 162 MMHG | DIASTOLIC BLOOD PRESSURE: 75 MMHG

## 2020-03-30 VITALS — SYSTOLIC BLOOD PRESSURE: 147 MMHG | DIASTOLIC BLOOD PRESSURE: 100 MMHG

## 2020-03-30 VITALS — DIASTOLIC BLOOD PRESSURE: 70 MMHG | SYSTOLIC BLOOD PRESSURE: 143 MMHG

## 2020-03-30 VITALS — SYSTOLIC BLOOD PRESSURE: 127 MMHG | DIASTOLIC BLOOD PRESSURE: 57 MMHG

## 2020-03-30 VITALS — DIASTOLIC BLOOD PRESSURE: 51 MMHG | SYSTOLIC BLOOD PRESSURE: 142 MMHG

## 2020-03-30 VITALS — DIASTOLIC BLOOD PRESSURE: 94 MMHG | SYSTOLIC BLOOD PRESSURE: 134 MMHG

## 2020-03-30 VITALS — SYSTOLIC BLOOD PRESSURE: 164 MMHG | DIASTOLIC BLOOD PRESSURE: 77 MMHG

## 2020-03-30 VITALS — SYSTOLIC BLOOD PRESSURE: 157 MMHG | DIASTOLIC BLOOD PRESSURE: 81 MMHG

## 2020-03-30 VITALS — SYSTOLIC BLOOD PRESSURE: 159 MMHG | DIASTOLIC BLOOD PRESSURE: 86 MMHG

## 2020-03-30 VITALS — DIASTOLIC BLOOD PRESSURE: 82 MMHG | SYSTOLIC BLOOD PRESSURE: 152 MMHG

## 2020-03-30 VITALS — SYSTOLIC BLOOD PRESSURE: 156 MMHG | DIASTOLIC BLOOD PRESSURE: 117 MMHG

## 2020-03-30 VITALS — DIASTOLIC BLOOD PRESSURE: 67 MMHG | SYSTOLIC BLOOD PRESSURE: 140 MMHG

## 2020-03-30 VITALS — DIASTOLIC BLOOD PRESSURE: 69 MMHG | SYSTOLIC BLOOD PRESSURE: 144 MMHG

## 2020-03-30 VITALS — SYSTOLIC BLOOD PRESSURE: 171 MMHG | DIASTOLIC BLOOD PRESSURE: 76 MMHG

## 2020-03-30 VITALS — DIASTOLIC BLOOD PRESSURE: 69 MMHG | SYSTOLIC BLOOD PRESSURE: 126 MMHG

## 2020-03-30 VITALS — DIASTOLIC BLOOD PRESSURE: 72 MMHG | SYSTOLIC BLOOD PRESSURE: 132 MMHG

## 2020-03-30 VITALS — SYSTOLIC BLOOD PRESSURE: 157 MMHG | DIASTOLIC BLOOD PRESSURE: 75 MMHG

## 2020-03-30 VITALS — DIASTOLIC BLOOD PRESSURE: 72 MMHG | SYSTOLIC BLOOD PRESSURE: 123 MMHG

## 2020-03-30 VITALS — SYSTOLIC BLOOD PRESSURE: 132 MMHG | DIASTOLIC BLOOD PRESSURE: 69 MMHG

## 2020-03-30 VITALS — SYSTOLIC BLOOD PRESSURE: 167 MMHG | DIASTOLIC BLOOD PRESSURE: 78 MMHG

## 2020-03-30 LAB
BASOPHILS # BLD AUTO: 0 /CMM (ref 0–0.2)
BASOPHILS NFR BLD AUTO: 0.2 % (ref 0–2)
BUN SERPL-MCNC: 18 MG/DL (ref 7–18)
CALCIUM SERPL-MCNC: 8 MG/DL (ref 8.5–10.1)
CHLORIDE SERPL-SCNC: 109 MMOL/L (ref 98–107)
CO2 SERPL-SCNC: 30 MMOL/L (ref 21–32)
CREAT SERPL-MCNC: 0.7 MG/DL (ref 0.6–1.3)
EOSINOPHIL NFR BLD AUTO: 0 % (ref 0–6)
GLUCOSE SERPL-MCNC: 129 MG/DL (ref 74–106)
HCT VFR BLD AUTO: 23 % (ref 39–51)
HGB BLD-MCNC: 7.1 G/DL (ref 13.5–17.5)
LYMPHOCYTES NFR BLD AUTO: 0.8 /CMM (ref 0.8–4.8)
LYMPHOCYTES NFR BLD AUTO: 4.4 % (ref 20–44)
MAGNESIUM SERPL-MCNC: 1.6 MG/DL (ref 1.8–2.4)
MCHC RBC AUTO-ENTMCNC: 31 G/DL (ref 31–36)
MCV RBC AUTO: 92 FL (ref 80–96)
MONOCYTES NFR BLD AUTO: 0.7 /CMM (ref 0.1–1.3)
MONOCYTES NFR BLD AUTO: 4 % (ref 2–12)
NEUTROPHILS # BLD AUTO: 16.3 /CMM (ref 1.8–8.9)
NEUTROPHILS NFR BLD AUTO: 91.4 % (ref 43–81)
PLATELET # BLD AUTO: 183 /CMM (ref 150–450)
POTASSIUM SERPL-SCNC: 3.1 MMOL/L (ref 3.5–5.1)
RBC # BLD AUTO: 2.49 MIL/UL (ref 4.5–6)
SODIUM SERPL-SCNC: 145 MMOL/L (ref 136–145)
WBC NRBC COR # BLD AUTO: 17.9 K/UL (ref 4.3–11)

## 2020-03-30 RX ADMIN — MAGNESIUM SULFATE IN DEXTROSE SCH MLS/HR: 10 INJECTION, SOLUTION INTRAVENOUS at 10:15

## 2020-03-30 RX ADMIN — DEXTROSE MONOHYDRATE SCH MLS/HR: 50 INJECTION, SOLUTION INTRAVENOUS at 02:24

## 2020-03-30 RX ADMIN — MUPIROCIN SCH APPLIC: 20 OINTMENT TOPICAL at 09:20

## 2020-03-30 RX ADMIN — SODIUM CHLORIDE PRN MLS/HR: 9 INJECTION, SOLUTION INTRAVENOUS at 17:34

## 2020-03-30 RX ADMIN — POTASSIUM CHLORIDE SCH MLS/HR: 200 INJECTION, SOLUTION INTRAVENOUS at 14:13

## 2020-03-30 RX ADMIN — DEXTROSE MONOHYDRATE SCH MLS/HR: 50 INJECTION, SOLUTION INTRAVENOUS at 15:37

## 2020-03-30 RX ADMIN — MEROPENEM SCH MLS/HR: 1 INJECTION INTRAVENOUS at 09:12

## 2020-03-30 RX ADMIN — POTASSIUM CHLORIDE SCH MLS/HR: 200 INJECTION, SOLUTION INTRAVENOUS at 17:47

## 2020-03-30 RX ADMIN — Medication SCH OZ: at 09:19

## 2020-03-30 RX ADMIN — MAGNESIUM SULFATE IN DEXTROSE SCH MLS/HR: 10 INJECTION, SOLUTION INTRAVENOUS at 09:12

## 2020-03-30 RX ADMIN — HYDROCORTISONE SODIUM SUCCINATE SCH MG: 100 INJECTION, POWDER, FOR SOLUTION INTRAMUSCULAR; INTRAVASCULAR at 09:12

## 2020-03-30 RX ADMIN — Medication PRN ML: at 17:33

## 2020-03-30 RX ADMIN — POTASSIUM CHLORIDE SCH MLS/HR: 200 INJECTION, SOLUTION INTRAVENOUS at 10:15

## 2020-03-30 RX ADMIN — HYDROCORTISONE SODIUM SUCCINATE SCH MG: 100 INJECTION, POWDER, FOR SOLUTION INTRAMUSCULAR; INTRAVASCULAR at 18:25

## 2020-03-30 RX ADMIN — SODIUM CHLORIDE SCH MG: 9 INJECTION, SOLUTION INTRAVENOUS at 18:23

## 2020-03-30 RX ADMIN — POTASSIUM CHLORIDE SCH MLS/HR: 200 INJECTION, SOLUTION INTRAVENOUS at 12:51

## 2020-03-30 RX ADMIN — MEROPENEM SCH MLS/HR: 1 INJECTION INTRAVENOUS at 21:18

## 2020-03-30 RX ADMIN — MUPIROCIN SCH APPLIC: 20 OINTMENT TOPICAL at 17:00

## 2020-03-30 RX ADMIN — ALBUTEROL SULFATE SCH PUFF: 90 AEROSOL, METERED RESPIRATORY (INHALATION) at 06:11

## 2020-03-30 RX ADMIN — SODIUM CHLORIDE SCH MG: 9 INJECTION, SOLUTION INTRAVENOUS at 09:13

## 2020-03-30 RX ADMIN — ACETYLCYSTEINE SCH MG: 100 INHALANT RESPIRATORY (INHALATION) at 09:13

## 2020-03-30 RX ADMIN — ACETYLCYSTEINE SCH MG: 100 INHALANT RESPIRATORY (INHALATION) at 15:38

## 2020-03-30 RX ADMIN — POTASSIUM CHLORIDE SCH MLS/HR: 200 INJECTION, SOLUTION INTRAVENOUS at 15:37

## 2020-03-30 RX ADMIN — HYDROCORTISONE SODIUM SUCCINATE SCH MG: 100 INJECTION, POWDER, FOR SOLUTION INTRAMUSCULAR; INTRAVASCULAR at 12:55

## 2020-03-30 RX ADMIN — POTASSIUM CHLORIDE SCH MLS/HR: 200 INJECTION, SOLUTION INTRAVENOUS at 11:44

## 2020-03-30 RX ADMIN — ACETYLCYSTEINE SCH MG: 100 INHALANT RESPIRATORY (INHALATION) at 23:28

## 2020-03-30 RX ADMIN — SODIUM CHLORIDE PRN MLS/HR: 9 INJECTION, SOLUTION INTRAVENOUS at 00:05

## 2020-03-30 NOTE — NUR
END OF SHIFT NOTE:  PT ORIENTED TO SELF.  PT HAD A FAIRLY UNEVENTFUL SHIFT.  DR. IQBAL 
STATED PT NEEDS TO STAY IN ICU WHILE ON VENTURI MASK.  PT'S HEART RATE ELEVATED INTO THE 
160'S INTERMITTENTLY DURING THE DAY WHEN PATIENT GOT ANXIOUS, HIGH HEART RATE DID NOT 
SUSTAIN LONG AND CAME BACK DOWN TO NORMAL WHEN PATIENT WAS CALM.  SPEECH THERAPY STATED THEY 
WOUND NOT DO ANOTHER SWALLOW EVAL WHILE PATIENTS RESPIRATORY STATUS IS COMPROMISED.  ORDER 
FOR NG TUBE PLACEMENT AND TUBE FEEDINGS RECEIVED.  NG TUBE INSERTED IN LEFT NARE AT 1730, PT 
PULLED OUT TUBE AT 1830, ENDORSED TO NIGHT SHIFT TO REPLACE NG TUBE AND TO KEEP RESTRAINTS 
ON PATIENTS.  PT CHECKED ON HOURLY AND PRN BY NURSING STAFF.

## 2020-03-30 NOTE — NUR
RN ICU NOTES



RECEIVED PATIENT AWAKE IN BED SITTING POSITION, A/OX1-2, ON 02 VIA MASK AT 5LPM,  NO 
BREATHING EVEN AND UNLABORED, NO S/S OF SOB/ACUTE RESPIRATORY DISTRESS NOTED, HOB ELEVATED, 
AFIB WITH PVCS IN TELE MONITOR WITH HR IN 80S AT THIS TIME, OPTIMAL O2 SAT LEVEL, NO C/O OR 
S/S OF PAIN OR DISCOMFORT AT THIS TIME,  IVF INFUSING AS ORDERED VIA FEMORAL TLC, FC IN 
PLACE. DRAINING YELLOW URINE BY GRAVITY, PATENCY INTACT,  BED LOCKED AND IN LOW POSITION, 
S/R OF BED UP AS PER PROTOCOL, CALL LIGHT W/I REACH, WILL CONTINUE TO MONITOR CLOSELY.

## 2020-03-31 VITALS — DIASTOLIC BLOOD PRESSURE: 97 MMHG | SYSTOLIC BLOOD PRESSURE: 132 MMHG

## 2020-03-31 VITALS — SYSTOLIC BLOOD PRESSURE: 148 MMHG | DIASTOLIC BLOOD PRESSURE: 63 MMHG

## 2020-03-31 VITALS — DIASTOLIC BLOOD PRESSURE: 96 MMHG | SYSTOLIC BLOOD PRESSURE: 145 MMHG

## 2020-03-31 VITALS — DIASTOLIC BLOOD PRESSURE: 62 MMHG | SYSTOLIC BLOOD PRESSURE: 159 MMHG

## 2020-03-31 VITALS — SYSTOLIC BLOOD PRESSURE: 161 MMHG | DIASTOLIC BLOOD PRESSURE: 74 MMHG

## 2020-03-31 VITALS — SYSTOLIC BLOOD PRESSURE: 152 MMHG | DIASTOLIC BLOOD PRESSURE: 59 MMHG

## 2020-03-31 VITALS — DIASTOLIC BLOOD PRESSURE: 73 MMHG | SYSTOLIC BLOOD PRESSURE: 148 MMHG

## 2020-03-31 VITALS — SYSTOLIC BLOOD PRESSURE: 156 MMHG | DIASTOLIC BLOOD PRESSURE: 84 MMHG

## 2020-03-31 VITALS — SYSTOLIC BLOOD PRESSURE: 145 MMHG | DIASTOLIC BLOOD PRESSURE: 65 MMHG

## 2020-03-31 VITALS — DIASTOLIC BLOOD PRESSURE: 89 MMHG | SYSTOLIC BLOOD PRESSURE: 149 MMHG

## 2020-03-31 VITALS — DIASTOLIC BLOOD PRESSURE: 76 MMHG | SYSTOLIC BLOOD PRESSURE: 166 MMHG

## 2020-03-31 VITALS — SYSTOLIC BLOOD PRESSURE: 150 MMHG | DIASTOLIC BLOOD PRESSURE: 83 MMHG

## 2020-03-31 VITALS — DIASTOLIC BLOOD PRESSURE: 92 MMHG | SYSTOLIC BLOOD PRESSURE: 152 MMHG

## 2020-03-31 VITALS — SYSTOLIC BLOOD PRESSURE: 171 MMHG | DIASTOLIC BLOOD PRESSURE: 82 MMHG

## 2020-03-31 VITALS — SYSTOLIC BLOOD PRESSURE: 122 MMHG | DIASTOLIC BLOOD PRESSURE: 67 MMHG

## 2020-03-31 VITALS — SYSTOLIC BLOOD PRESSURE: 159 MMHG | DIASTOLIC BLOOD PRESSURE: 77 MMHG

## 2020-03-31 VITALS — DIASTOLIC BLOOD PRESSURE: 83 MMHG | SYSTOLIC BLOOD PRESSURE: 150 MMHG

## 2020-03-31 VITALS — SYSTOLIC BLOOD PRESSURE: 132 MMHG | DIASTOLIC BLOOD PRESSURE: 76 MMHG

## 2020-03-31 VITALS — DIASTOLIC BLOOD PRESSURE: 80 MMHG | SYSTOLIC BLOOD PRESSURE: 140 MMHG

## 2020-03-31 VITALS — DIASTOLIC BLOOD PRESSURE: 96 MMHG | SYSTOLIC BLOOD PRESSURE: 150 MMHG

## 2020-03-31 VITALS — SYSTOLIC BLOOD PRESSURE: 127 MMHG | DIASTOLIC BLOOD PRESSURE: 66 MMHG

## 2020-03-31 VITALS — SYSTOLIC BLOOD PRESSURE: 132 MMHG | DIASTOLIC BLOOD PRESSURE: 97 MMHG

## 2020-03-31 VITALS — SYSTOLIC BLOOD PRESSURE: 146 MMHG | DIASTOLIC BLOOD PRESSURE: 66 MMHG

## 2020-03-31 VITALS — DIASTOLIC BLOOD PRESSURE: 89 MMHG | SYSTOLIC BLOOD PRESSURE: 143 MMHG

## 2020-03-31 VITALS — SYSTOLIC BLOOD PRESSURE: 149 MMHG | DIASTOLIC BLOOD PRESSURE: 89 MMHG

## 2020-03-31 VITALS — SYSTOLIC BLOOD PRESSURE: 153 MMHG | DIASTOLIC BLOOD PRESSURE: 95 MMHG

## 2020-03-31 VITALS — SYSTOLIC BLOOD PRESSURE: 169 MMHG | DIASTOLIC BLOOD PRESSURE: 66 MMHG

## 2020-03-31 VITALS — DIASTOLIC BLOOD PRESSURE: 70 MMHG | SYSTOLIC BLOOD PRESSURE: 160 MMHG

## 2020-03-31 VITALS — SYSTOLIC BLOOD PRESSURE: 166 MMHG | DIASTOLIC BLOOD PRESSURE: 102 MMHG

## 2020-03-31 VITALS — DIASTOLIC BLOOD PRESSURE: 70 MMHG | SYSTOLIC BLOOD PRESSURE: 142 MMHG

## 2020-03-31 VITALS — SYSTOLIC BLOOD PRESSURE: 167 MMHG | DIASTOLIC BLOOD PRESSURE: 86 MMHG

## 2020-03-31 VITALS — DIASTOLIC BLOOD PRESSURE: 77 MMHG | SYSTOLIC BLOOD PRESSURE: 153 MMHG

## 2020-03-31 VITALS — DIASTOLIC BLOOD PRESSURE: 87 MMHG | SYSTOLIC BLOOD PRESSURE: 159 MMHG

## 2020-03-31 VITALS — SYSTOLIC BLOOD PRESSURE: 139 MMHG | DIASTOLIC BLOOD PRESSURE: 87 MMHG

## 2020-03-31 VITALS — DIASTOLIC BLOOD PRESSURE: 91 MMHG | SYSTOLIC BLOOD PRESSURE: 153 MMHG

## 2020-03-31 VITALS — SYSTOLIC BLOOD PRESSURE: 132 MMHG | DIASTOLIC BLOOD PRESSURE: 69 MMHG

## 2020-03-31 VITALS — SYSTOLIC BLOOD PRESSURE: 163 MMHG | DIASTOLIC BLOOD PRESSURE: 98 MMHG

## 2020-03-31 VITALS — DIASTOLIC BLOOD PRESSURE: 78 MMHG | SYSTOLIC BLOOD PRESSURE: 154 MMHG

## 2020-03-31 VITALS — DIASTOLIC BLOOD PRESSURE: 61 MMHG | SYSTOLIC BLOOD PRESSURE: 130 MMHG

## 2020-03-31 VITALS — DIASTOLIC BLOOD PRESSURE: 71 MMHG | SYSTOLIC BLOOD PRESSURE: 149 MMHG

## 2020-03-31 VITALS — SYSTOLIC BLOOD PRESSURE: 141 MMHG | DIASTOLIC BLOOD PRESSURE: 66 MMHG

## 2020-03-31 VITALS — DIASTOLIC BLOOD PRESSURE: 71 MMHG | SYSTOLIC BLOOD PRESSURE: 148 MMHG

## 2020-03-31 VITALS — SYSTOLIC BLOOD PRESSURE: 157 MMHG | DIASTOLIC BLOOD PRESSURE: 101 MMHG

## 2020-03-31 VITALS — SYSTOLIC BLOOD PRESSURE: 148 MMHG | DIASTOLIC BLOOD PRESSURE: 93 MMHG

## 2020-03-31 VITALS — SYSTOLIC BLOOD PRESSURE: 148 MMHG | DIASTOLIC BLOOD PRESSURE: 73 MMHG

## 2020-03-31 VITALS — DIASTOLIC BLOOD PRESSURE: 72 MMHG | SYSTOLIC BLOOD PRESSURE: 146 MMHG

## 2020-03-31 VITALS — SYSTOLIC BLOOD PRESSURE: 153 MMHG | DIASTOLIC BLOOD PRESSURE: 94 MMHG

## 2020-03-31 VITALS — DIASTOLIC BLOOD PRESSURE: 68 MMHG | SYSTOLIC BLOOD PRESSURE: 168 MMHG

## 2020-03-31 VITALS — DIASTOLIC BLOOD PRESSURE: 66 MMHG | SYSTOLIC BLOOD PRESSURE: 117 MMHG

## 2020-03-31 LAB
BASOPHILS # BLD AUTO: 0 /CMM (ref 0–0.2)
BASOPHILS NFR BLD AUTO: 0.1 % (ref 0–2)
BUN SERPL-MCNC: 21 MG/DL (ref 7–18)
CALCIUM SERPL-MCNC: 7.8 MG/DL (ref 8.5–10.1)
CHLORIDE SERPL-SCNC: 111 MMOL/L (ref 98–107)
CO2 SERPL-SCNC: 32 MMOL/L (ref 21–32)
CREAT SERPL-MCNC: 0.8 MG/DL (ref 0.6–1.3)
EOSINOPHIL NFR BLD AUTO: 0 % (ref 0–6)
GLUCOSE SERPL-MCNC: 151 MG/DL (ref 74–106)
HCT VFR BLD AUTO: 20 % (ref 39–51)
HGB BLD-MCNC: 6.2 G/DL (ref 13.5–17.5)
LYMPHOCYTES NFR BLD AUTO: 0.6 /CMM (ref 0.8–4.8)
LYMPHOCYTES NFR BLD AUTO: 3.8 % (ref 20–44)
LYMPHOCYTES NFR BLD MANUAL: 2 % (ref 16–48)
MAGNESIUM SERPL-MCNC: 2 MG/DL (ref 1.8–2.4)
MCHC RBC AUTO-ENTMCNC: 31 G/DL (ref 31–36)
MCV RBC AUTO: 92 FL (ref 80–96)
MONOCYTES NFR BLD AUTO: 0.7 /CMM (ref 0.1–1.3)
MONOCYTES NFR BLD AUTO: 3.9 % (ref 2–12)
MONOCYTES NFR BLD MANUAL: 1 % (ref 0–11)
NEUTROPHILS # BLD AUTO: 15.7 /CMM (ref 1.8–8.9)
NEUTROPHILS NFR BLD AUTO: 92.2 % (ref 43–81)
NEUTS SEG NFR BLD MANUAL: 97 % (ref 42–76)
PLATELET # BLD AUTO: 175 /CMM (ref 150–450)
POTASSIUM SERPL-SCNC: 3.5 MMOL/L (ref 3.5–5.1)
RBC # BLD AUTO: 2.18 MIL/UL (ref 4.5–6)
SODIUM SERPL-SCNC: 146 MMOL/L (ref 136–145)
WBC NRBC COR # BLD AUTO: 17 K/UL (ref 4.3–11)

## 2020-03-31 PROCEDURE — 05H933Z INSERTION OF INFUSION DEVICE INTO RIGHT BRACHIAL VEIN, PERCUTANEOUS APPROACH: ICD-10-PCS | Performed by: NURSE PRACTITIONER

## 2020-03-31 PROCEDURE — 30233N1 TRANSFUSION OF NONAUTOLOGOUS RED BLOOD CELLS INTO PERIPHERAL VEIN, PERCUTANEOUS APPROACH: ICD-10-PCS | Performed by: LEGAL MEDICINE

## 2020-03-31 RX ADMIN — HYDROCORTISONE SODIUM SUCCINATE SCH MG: 100 INJECTION, POWDER, FOR SOLUTION INTRAMUSCULAR; INTRAVASCULAR at 13:41

## 2020-03-31 RX ADMIN — MUPIROCIN SCH APPLIC: 20 OINTMENT TOPICAL at 16:35

## 2020-03-31 RX ADMIN — DEXTROSE MONOHYDRATE SCH MLS/HR: 50 INJECTION, SOLUTION INTRAVENOUS at 02:13

## 2020-03-31 RX ADMIN — SODIUM CHLORIDE PRN MLS/HR: 9 INJECTION, SOLUTION INTRAVENOUS at 07:02

## 2020-03-31 RX ADMIN — MUPIROCIN SCH APPLIC: 20 OINTMENT TOPICAL at 09:09

## 2020-03-31 RX ADMIN — SODIUM CHLORIDE SCH MG: 9 INJECTION, SOLUTION INTRAVENOUS at 09:08

## 2020-03-31 RX ADMIN — ALBUTEROL SULFATE SCH PUFF: 90 AEROSOL, METERED RESPIRATORY (INHALATION) at 06:23

## 2020-03-31 RX ADMIN — MEROPENEM SCH MLS/HR: 1 INJECTION INTRAVENOUS at 09:10

## 2020-03-31 RX ADMIN — DEXTROSE MONOHYDRATE SCH MLS/HR: 50 INJECTION, SOLUTION INTRAVENOUS at 14:47

## 2020-03-31 RX ADMIN — ACETYLCYSTEINE SCH MG: 100 INHALANT RESPIRATORY (INHALATION) at 14:28

## 2020-03-31 RX ADMIN — SODIUM CHLORIDE SCH MG: 9 INJECTION, SOLUTION INTRAVENOUS at 16:35

## 2020-03-31 RX ADMIN — Medication SCH OZ: at 09:10

## 2020-03-31 RX ADMIN — ACETYLCYSTEINE SCH MG: 100 INHALANT RESPIRATORY (INHALATION) at 23:29

## 2020-03-31 RX ADMIN — ALBUTEROL SULFATE SCH PUFF: 90 AEROSOL, METERED RESPIRATORY (INHALATION) at 00:39

## 2020-03-31 RX ADMIN — MEROPENEM SCH MLS/HR: 1 INJECTION INTRAVENOUS at 20:48

## 2020-03-31 RX ADMIN — ALBUTEROL SULFATE SCH PUFF: 90 AEROSOL, METERED RESPIRATORY (INHALATION) at 13:41

## 2020-03-31 RX ADMIN — HYDROCORTISONE SODIUM SUCCINATE SCH MG: 100 INJECTION, POWDER, FOR SOLUTION INTRAMUSCULAR; INTRAVASCULAR at 16:35

## 2020-03-31 RX ADMIN — ACETYLCYSTEINE SCH MG: 100 INHALANT RESPIRATORY (INHALATION) at 08:25

## 2020-03-31 RX ADMIN — ALBUTEROL SULFATE SCH PUFF: 90 AEROSOL, METERED RESPIRATORY (INHALATION) at 17:07

## 2020-03-31 RX ADMIN — HYDROCORTISONE SODIUM SUCCINATE SCH MG: 100 INJECTION, POWDER, FOR SOLUTION INTRAMUSCULAR; INTRAVASCULAR at 09:07

## 2020-03-31 RX ADMIN — Medication PRN ML: at 16:39

## 2020-03-31 NOTE — NUR
RN NOTES,



RECEIVED CRITICAL RESULT FOR H&H FROM LAB AND REPORTED TO DR MATUTE, AND HE REPLIED WITH 
ORDER TO TRANSFUSE 2 UNITS OF BLOOD, NOTED AND CARRIED OUT.

## 2020-03-31 NOTE — NUR
RN NOTES

RECEIVED PT ON BED, A/Ox1-2, ON VENTURI MASK AT 50% FIO2, NO SOB NOTED, ON TELE SR-A.FIB  HR 
'S , NS AT 75CC/HR RUNNING VIA R FEMORAL TLC IV SITE, KRAUSE DRAINING TO GRAVITY, WITH 
YELLOW URINE, TF AT 20CC/HR RUNNING VIA R NARE NGT, TOLERATING WELL, NO RESIDUAL NOTED, RATE 
INCREASED TO 30 CC/HR AT THIS TIME, BED LOCKED AND IN LOWEST POSITION, SR UP x3,  CALL LIGHT 
WITHIN EASY REACH, BED LOCKED AND IN LOWEST POSITION, CONTINUE TO MONITOR.

## 2020-03-31 NOTE — NUR
RN NOTES



RECEIVED PT AWAKE  WITH O2 6LPM VIA NC WITH AFIB ON TELE MONITOR. ALERT TO SELF. DENIES PAIN 
/ WITH NGTF  TOLERATED WELL WITH HOB KEPT ELEVATED.  IV SITE ON RIGHT FEMORAL TLC  INTACT  
RUNNING WITH NS @ 0.9% NS @ 75 ML/HR.  AWAITING TO PLACE MIDLINE TO REMOVE FEMORAL LINE PER 
ID ORDER. PT IS S/P 2 UNIT BLD. TRANSFUSION. KRAUSE CATH DRAINED WITH YELLOW SEDIMENTED URINE 
KEPT OFF FROM THE FLOOR. KEPT PATIENT CLEAN AND DRY. DENIZ LT URNE AND REPOSITION ORDER

## 2020-03-31 NOTE — NUR
RN NOTES



PT IS SATURATION  IS GOING DOWN TO 88% PLACED  VENTURI MASK @ 50 LPM BY RT WENT UP TO 97%. 
HOB ELEVATED AND  KEPT  PT COMFORTBLE. PT  NOTICED PT WITH  EPISODE OF ANXIETY. COMFORT CARE 
 RENDEDRED TO MAKE THE PATIENT RELAX. WILL CONTINUE TO MONITOR.

## 2020-03-31 NOTE — NUR
RN NOTES



MIDLINE INSERTED ON LUKE WITH GOOD BLOOD RETURN. RIGHT FEMORAL TLC REMOVED WITH INTACT 
CATHETER. NO BLEEDING NOTED.

## 2020-03-31 NOTE — NUR
RN CLOSING NOTES,



PATIENT ENDORSED TO ELLIOTT MATTHEW FOR CONTINUATIONS OF CARE, PATIENT YELLING AT TIMES, WHENEVER 
SHOWS HIGH BP AND HIGH HR, AFTER HE CALM DOWN BP AND HR NORMALIZED, ENDORSED TO WENDY ABOUT 
THE BLOOD TRANSFUSION,  PATIENT CONTINUE ON FEEDING VIA RIGHT NARE NG, TOLERATED WELL, NO 
DISTRESS/SOB DURING THE NIGHT, CONTINUE ON BILATERAL WRIST SOFT RESTRAINS, NO CIRCULATION 
COMPROMISED, PULSES AND CIRCULATION CHECKED OFTEN.

## 2020-03-31 NOTE — NUR
RN NOTES 

PT ON 5-6L O2 N/C , O2 SAT 94-95%, NGT FEEDING  AT 55 CC/HR AT THIS TIME, PT TOLERATING 
WELL, NO RESIDUAL NOTED, KRAUSE DRAINING TO GRAVITY, R FEMORAL TLC INTACT, ORDER RECEIVED TO 
D/C FEMORAL LINE AND INSERT MIDLINE, NURSING SUPERVISOR NOTIFED , NO SIGNIFICANT CHANGES 
NOTED ON THIS SHIFT , WILL ENDORSE TO NIGHT SHIFT NURSE FOR CONTINUITY OF CARE.

## 2020-04-01 VITALS — SYSTOLIC BLOOD PRESSURE: 193 MMHG | DIASTOLIC BLOOD PRESSURE: 107 MMHG

## 2020-04-01 VITALS — SYSTOLIC BLOOD PRESSURE: 149 MMHG | DIASTOLIC BLOOD PRESSURE: 81 MMHG

## 2020-04-01 VITALS — SYSTOLIC BLOOD PRESSURE: 157 MMHG | DIASTOLIC BLOOD PRESSURE: 64 MMHG

## 2020-04-01 VITALS — SYSTOLIC BLOOD PRESSURE: 202 MMHG | DIASTOLIC BLOOD PRESSURE: 105 MMHG

## 2020-04-01 VITALS — SYSTOLIC BLOOD PRESSURE: 191 MMHG | DIASTOLIC BLOOD PRESSURE: 141 MMHG

## 2020-04-01 VITALS — DIASTOLIC BLOOD PRESSURE: 117 MMHG | SYSTOLIC BLOOD PRESSURE: 185 MMHG

## 2020-04-01 VITALS — SYSTOLIC BLOOD PRESSURE: 93 MMHG | DIASTOLIC BLOOD PRESSURE: 37 MMHG

## 2020-04-01 VITALS — DIASTOLIC BLOOD PRESSURE: 55 MMHG | SYSTOLIC BLOOD PRESSURE: 131 MMHG

## 2020-04-01 VITALS — SYSTOLIC BLOOD PRESSURE: 128 MMHG | DIASTOLIC BLOOD PRESSURE: 66 MMHG

## 2020-04-01 VITALS — SYSTOLIC BLOOD PRESSURE: 106 MMHG | DIASTOLIC BLOOD PRESSURE: 51 MMHG

## 2020-04-01 VITALS — DIASTOLIC BLOOD PRESSURE: 116 MMHG | SYSTOLIC BLOOD PRESSURE: 183 MMHG

## 2020-04-01 VITALS — SYSTOLIC BLOOD PRESSURE: 167 MMHG | DIASTOLIC BLOOD PRESSURE: 101 MMHG

## 2020-04-01 VITALS — DIASTOLIC BLOOD PRESSURE: 52 MMHG | SYSTOLIC BLOOD PRESSURE: 109 MMHG

## 2020-04-01 VITALS — SYSTOLIC BLOOD PRESSURE: 106 MMHG | DIASTOLIC BLOOD PRESSURE: 43 MMHG

## 2020-04-01 VITALS — DIASTOLIC BLOOD PRESSURE: 82 MMHG | SYSTOLIC BLOOD PRESSURE: 140 MMHG

## 2020-04-01 VITALS — DIASTOLIC BLOOD PRESSURE: 84 MMHG | SYSTOLIC BLOOD PRESSURE: 152 MMHG

## 2020-04-01 VITALS — SYSTOLIC BLOOD PRESSURE: 90 MMHG | DIASTOLIC BLOOD PRESSURE: 58 MMHG

## 2020-04-01 VITALS — SYSTOLIC BLOOD PRESSURE: 153 MMHG | DIASTOLIC BLOOD PRESSURE: 87 MMHG

## 2020-04-01 VITALS — SYSTOLIC BLOOD PRESSURE: 103 MMHG | DIASTOLIC BLOOD PRESSURE: 44 MMHG

## 2020-04-01 VITALS — SYSTOLIC BLOOD PRESSURE: 98 MMHG | DIASTOLIC BLOOD PRESSURE: 55 MMHG

## 2020-04-01 VITALS — SYSTOLIC BLOOD PRESSURE: 178 MMHG | DIASTOLIC BLOOD PRESSURE: 94 MMHG

## 2020-04-01 VITALS — SYSTOLIC BLOOD PRESSURE: 215 MMHG | DIASTOLIC BLOOD PRESSURE: 105 MMHG

## 2020-04-01 VITALS — DIASTOLIC BLOOD PRESSURE: 52 MMHG | SYSTOLIC BLOOD PRESSURE: 111 MMHG

## 2020-04-01 VITALS — SYSTOLIC BLOOD PRESSURE: 158 MMHG | DIASTOLIC BLOOD PRESSURE: 111 MMHG

## 2020-04-01 VITALS — DIASTOLIC BLOOD PRESSURE: 60 MMHG | SYSTOLIC BLOOD PRESSURE: 110 MMHG

## 2020-04-01 VITALS — SYSTOLIC BLOOD PRESSURE: 161 MMHG | DIASTOLIC BLOOD PRESSURE: 100 MMHG

## 2020-04-01 VITALS — DIASTOLIC BLOOD PRESSURE: 134 MMHG | SYSTOLIC BLOOD PRESSURE: 201 MMHG

## 2020-04-01 VITALS — SYSTOLIC BLOOD PRESSURE: 101 MMHG | DIASTOLIC BLOOD PRESSURE: 59 MMHG

## 2020-04-01 VITALS — DIASTOLIC BLOOD PRESSURE: 66 MMHG | SYSTOLIC BLOOD PRESSURE: 131 MMHG

## 2020-04-01 VITALS — DIASTOLIC BLOOD PRESSURE: 116 MMHG | SYSTOLIC BLOOD PRESSURE: 159 MMHG

## 2020-04-01 VITALS — SYSTOLIC BLOOD PRESSURE: 139 MMHG | DIASTOLIC BLOOD PRESSURE: 69 MMHG

## 2020-04-01 VITALS — DIASTOLIC BLOOD PRESSURE: 63 MMHG | SYSTOLIC BLOOD PRESSURE: 140 MMHG

## 2020-04-01 LAB
BASE EXCESS BLDA CALC-SCNC: 2.2 MMOL/L
BASE EXCESS BLDA CALC-SCNC: 4.7 MMOL/L
BASOPHILS # BLD AUTO: 0 /CMM (ref 0–0.2)
BASOPHILS NFR BLD AUTO: 0.1 % (ref 0–2)
BUN SERPL-MCNC: 15 MG/DL (ref 7–18)
CALCIUM SERPL-MCNC: 8.3 MG/DL (ref 8.5–10.1)
CHLORIDE SERPL-SCNC: 113 MMOL/L (ref 98–107)
CO2 SERPL-SCNC: 37 MMOL/L (ref 21–32)
CREAT SERPL-MCNC: 0.9 MG/DL (ref 0.6–1.3)
DO-HGB MFR BLDA: 180.4 MMHG
DO-HGB MFR BLDA: 204.9 MMHG
EOSINOPHIL NFR BLD AUTO: 0 % (ref 0–6)
GLUCOSE SERPL-MCNC: 157 MG/DL (ref 74–106)
HCT VFR BLD AUTO: 35 % (ref 39–51)
HGB BLD-MCNC: 11.6 G/DL (ref 13.5–17.5)
INHALED O2 CONCENTRATION: 50 %
INHALED O2 CONCENTRATION: 50 %
INHALED O2 FLOW RATE: 15 L/MIN (ref 0–30)
INHALED O2 FLOW RATE: 15 L/MIN (ref 0–30)
LYMPHOCYTES NFR BLD AUTO: 0.8 /CMM (ref 0.8–4.8)
LYMPHOCYTES NFR BLD AUTO: 5.5 % (ref 20–44)
MAGNESIUM SERPL-MCNC: 1.6 MG/DL (ref 1.8–2.4)
MCHC RBC AUTO-ENTMCNC: 33 G/DL (ref 31–36)
MCV RBC AUTO: 88 FL (ref 80–96)
MONOCYTES NFR BLD AUTO: 0.3 /CMM (ref 0.1–1.3)
MONOCYTES NFR BLD AUTO: 2.2 % (ref 2–12)
NEUTROPHILS # BLD AUTO: 14.2 /CMM (ref 1.8–8.9)
NEUTROPHILS NFR BLD AUTO: 92.2 % (ref 43–81)
PCO2 TEMP ADJ BLDA: 52.9 MMHG (ref 35–45)
PCO2 TEMP ADJ BLDA: 60.1 MMHG (ref 35–45)
PH TEMP ADJ BLDA: 7.34 [PH] (ref 7.35–7.45)
PH TEMP ADJ BLDA: 7.35 [PH] (ref 7.35–7.45)
PLATELET # BLD AUTO: 209 /CMM (ref 150–450)
PO2 TEMP ADJ BLDA: 108.5 MMHG (ref 75–100)
PO2 TEMP ADJ BLDA: 92.1 MMHG (ref 75–100)
POTASSIUM SERPL-SCNC: 3.4 MMOL/L (ref 3.5–5.1)
RBC # BLD AUTO: 4 MIL/UL (ref 4.5–6)
SAO2 % BLDA: 96.4 % (ref 92–98.5)
SAO2 % BLDA: 97 % (ref 92–98.5)
SODIUM SERPL-SCNC: 150 MMOL/L (ref 136–145)
VENTILATION MODE VENT: (no result)
WBC NRBC COR # BLD AUTO: 15.4 K/UL (ref 4.3–11)

## 2020-04-01 PROCEDURE — 5A09557 ASSISTANCE WITH RESPIRATORY VENTILATION, GREATER THAN 96 CONSECUTIVE HOURS, CONTINUOUS POSITIVE AIRWAY PRESSURE: ICD-10-PCS | Performed by: INTERNAL MEDICINE

## 2020-04-01 RX ADMIN — ACETYLCYSTEINE SCH MG: 100 INHALANT RESPIRATORY (INHALATION) at 23:22

## 2020-04-01 RX ADMIN — ACETYLCYSTEINE SCH MG: 100 INHALANT RESPIRATORY (INHALATION) at 07:23

## 2020-04-01 RX ADMIN — ALBUTEROL SULFATE SCH PUFF: 90 AEROSOL, METERED RESPIRATORY (INHALATION) at 06:05

## 2020-04-01 RX ADMIN — MEROPENEM SCH MLS/HR: 500 INJECTION INTRAVENOUS at 21:22

## 2020-04-01 RX ADMIN — ACETYLCYSTEINE SCH MG: 100 INHALANT RESPIRATORY (INHALATION) at 14:34

## 2020-04-01 RX ADMIN — ALBUTEROL SULFATE SCH PUFF: 90 AEROSOL, METERED RESPIRATORY (INHALATION) at 01:38

## 2020-04-01 RX ADMIN — MUPIROCIN SCH APPLIC: 20 OINTMENT TOPICAL at 08:09

## 2020-04-01 RX ADMIN — MUPIROCIN SCH APPLIC: 20 OINTMENT TOPICAL at 17:19

## 2020-04-01 RX ADMIN — ALBUTEROL SULFATE SCH PUFF: 90 AEROSOL, METERED RESPIRATORY (INHALATION) at 11:46

## 2020-04-01 RX ADMIN — HYDROCORTISONE SODIUM SUCCINATE SCH MG: 100 INJECTION, POWDER, FOR SOLUTION INTRAMUSCULAR; INTRAVASCULAR at 17:18

## 2020-04-01 RX ADMIN — MAGNESIUM SULFATE IN DEXTROSE SCH MLS/HR: 10 INJECTION, SOLUTION INTRAVENOUS at 08:32

## 2020-04-01 RX ADMIN — ALBUTEROL SULFATE SCH PUFF: 90 AEROSOL, METERED RESPIRATORY (INHALATION) at 23:31

## 2020-04-01 RX ADMIN — NITROGLYCERIN SCH GM: 20 OINTMENT TOPICAL at 08:02

## 2020-04-01 RX ADMIN — Medication SCH OZ: at 08:09

## 2020-04-01 RX ADMIN — HYDROCORTISONE SODIUM SUCCINATE SCH MG: 100 INJECTION, POWDER, FOR SOLUTION INTRAMUSCULAR; INTRAVASCULAR at 08:08

## 2020-04-01 RX ADMIN — PANTOPRAZOLE SODIUM SCH MG: 40 GRANULE, DELAYED RELEASE ORAL at 08:08

## 2020-04-01 RX ADMIN — SODIUM CHLORIDE PRN MLS/HR: 9 INJECTION, SOLUTION INTRAVENOUS at 00:36

## 2020-04-01 RX ADMIN — ALBUTEROL SULFATE SCH PUFF: 90 AEROSOL, METERED RESPIRATORY (INHALATION) at 19:53

## 2020-04-01 RX ADMIN — Medication PRN ML: at 16:58

## 2020-04-01 RX ADMIN — NITROGLYCERIN SCH GM: 20 OINTMENT TOPICAL at 21:35

## 2020-04-01 RX ADMIN — NITROGLYCERIN SCH GM: 20 OINTMENT TOPICAL at 08:08

## 2020-04-01 RX ADMIN — MAGNESIUM SULFATE IN DEXTROSE SCH MLS/HR: 10 INJECTION, SOLUTION INTRAVENOUS at 10:35

## 2020-04-01 NOTE — NUR
RN NOTE



INFORMED DR. MATUTE REGARDING PATIENT  HYPERTENSION AND TACHYCARDIA 'S AND 
TACHYPNEIC RESP. 30'S. PT IS AWAKE. BILATERAL LUNG SOUND CRACKLES. BREATHING LABORED  HOB 
ELEVATED VENTI MASK @ 15LPM FIO2 50%. SATURATION 97%. DENIES PAIN.  WAITING TO CALL BACK.

## 2020-04-01 NOTE — NUR
PT PLACED INTO BIPAP WITH PARAMETERS BELOW PER DR. IQBAL DUE TO INCREASED WORK OF BREATHING:



IPAP 15

EPAP 5

RATE 12

FIO2 50%



BREATH SOUNDS BILATERAL CRACKLES. AMBU BAG @ BEDSIDE.


-------------------------------------------------------------------------------

Addendum: 04/01/20 at 1428 by PAL FROST RT

-------------------------------------------------------------------------------

Amended: Links added.

## 2020-04-01 NOTE — NUR
ICU RN NOTE



RECEIVED PATIENT AROUND THIS TIME. PATIENT IN BED RESTING WITH HOB ELEVATED. CONFUSED. 
RESPONSIVE TO VERBAL AND TACTILE STIMULI. ON BIPAP. ON NGT FEEDING, JEVITY 1.2 RUNNING AT 55 
ML/HR. ON KRAUSE, URINE IS YELLOW IN COLOR AND NOTED WITH SEDIMENTS. PATIENT IS ON BILATERAL 
SOFT WRIST RESTRAINTS. IN NO APPARENT DISTRESS NOTED AT THIS TIME. WILL CONTINUE TO MONITOR.

## 2020-04-01 NOTE — NUR
RN OPENING NOTE: RECEIVED PATIENT IN BED THIS MORNING SLEEPING. PATIENT IS EASILY AROUSED, 
ALERT X ORIENTED X1. PATIENT IS ON VENTURI MASK 15L/MIN, LUNG SOUNDS CLEAR, SHALLOW 
RESPIRATIONS, NO SIGNS OF RESPIRATORY DISTRESS NOTED, SATING WELL. PATIENT IS ON TELE 
MONITOR, PAROXYSMAL, UNCONTROLLED AFIB 117 W/ BBB NOTED. LUKE MIDLINE, FLUSHING WELL, SITE 
C/D/I, NO SIGNS OF COMPLICATIONS NOTED. NG TUBE IN RIGHT NARES, AUSCULTATED TO CHECK FOR 
PLACEMENT, NO COMPLICATIONS NOTED, NO RESIDUAL UPON ASPIRATION, RUNNING JEVITY 1/2 @ 
55CC/HR. PATIENT HAS KRAUSE, DRAINING YELLOW, SEDIMENTS NOTED. ON CONTACT ISOLATIONS FOR MRSA 
OF NARES. BUE RESTRAINTS D/T PATIENT REMOVING MEDICAL EQUIPMENT. BUE, BLE, SCROTAL EDEMA 
NOTED.  SAFETY MEASURES IMPLEMENTED, BED IN LOWEST POSITION, LOCKED, SIDE RAILS UP X2, CALL 
LIGHT WITHIN REACH. WILL CONTINUE TO CLOSELY MONITOR PATIENT FOR CHANGES.

## 2020-04-01 NOTE — NUR
RN NOTES



REPOSITION PATIENT  ON RIGHT SIDE AND SLOWLY CALM DOWN , REMAINED TACHYCARDIC BUT  SBP WENT 
DOWN , ABG TURNS GOOD INFORMED MD STILL WAITING  FOR  ORDER.

## 2020-04-01 NOTE — NUR
PT LETHARGIC AT THIS TIME, INCREASED WORK OF BREATH.  DR. IQBAL AT BEDSIDE.  ABG ORDERED AND 
DRAWN.  PT PUT ON BIPAP 15/5 AT 50% POST ABG RESULTS.  WILL CONTINUE TO MONITOR.

## 2020-04-01 NOTE — NUR
RN NOTES



RECEIVED AN ORDER  TO DR. JUJU BROWN GIVE  LASIX 20 MG IV ONCE  AND DECREASE IVF TO 25 
ML/HR NOTED AND CARRIED OUT ORDER WILL WAIT  MEDICINE  TO VERIFIED BY PHARMACY PATIENT  BP 
NOW  /64 MMHG  SATURATION 99%.  PT ASLEEP AT THIS TIME. HOB KEPT ELEVATED . WILL 
CONTINUE TO MONITOR.

## 2020-04-02 VITALS — SYSTOLIC BLOOD PRESSURE: 91 MMHG | DIASTOLIC BLOOD PRESSURE: 43 MMHG

## 2020-04-02 VITALS — DIASTOLIC BLOOD PRESSURE: 51 MMHG | SYSTOLIC BLOOD PRESSURE: 112 MMHG

## 2020-04-02 VITALS — DIASTOLIC BLOOD PRESSURE: 47 MMHG | SYSTOLIC BLOOD PRESSURE: 92 MMHG

## 2020-04-02 VITALS — DIASTOLIC BLOOD PRESSURE: 81 MMHG | SYSTOLIC BLOOD PRESSURE: 152 MMHG

## 2020-04-02 VITALS — SYSTOLIC BLOOD PRESSURE: 93 MMHG | DIASTOLIC BLOOD PRESSURE: 49 MMHG

## 2020-04-02 VITALS — DIASTOLIC BLOOD PRESSURE: 57 MMHG | SYSTOLIC BLOOD PRESSURE: 120 MMHG

## 2020-04-02 VITALS — SYSTOLIC BLOOD PRESSURE: 114 MMHG | DIASTOLIC BLOOD PRESSURE: 66 MMHG

## 2020-04-02 VITALS — SYSTOLIC BLOOD PRESSURE: 139 MMHG | DIASTOLIC BLOOD PRESSURE: 66 MMHG

## 2020-04-02 VITALS — DIASTOLIC BLOOD PRESSURE: 52 MMHG | SYSTOLIC BLOOD PRESSURE: 97 MMHG

## 2020-04-02 VITALS — SYSTOLIC BLOOD PRESSURE: 91 MMHG | DIASTOLIC BLOOD PRESSURE: 37 MMHG

## 2020-04-02 VITALS — SYSTOLIC BLOOD PRESSURE: 143 MMHG | DIASTOLIC BLOOD PRESSURE: 81 MMHG

## 2020-04-02 VITALS — DIASTOLIC BLOOD PRESSURE: 71 MMHG | SYSTOLIC BLOOD PRESSURE: 114 MMHG

## 2020-04-02 VITALS — DIASTOLIC BLOOD PRESSURE: 49 MMHG | SYSTOLIC BLOOD PRESSURE: 97 MMHG

## 2020-04-02 VITALS — DIASTOLIC BLOOD PRESSURE: 90 MMHG | SYSTOLIC BLOOD PRESSURE: 151 MMHG

## 2020-04-02 VITALS — DIASTOLIC BLOOD PRESSURE: 62 MMHG | SYSTOLIC BLOOD PRESSURE: 105 MMHG

## 2020-04-02 VITALS — DIASTOLIC BLOOD PRESSURE: 40 MMHG | SYSTOLIC BLOOD PRESSURE: 92 MMHG

## 2020-04-02 VITALS — DIASTOLIC BLOOD PRESSURE: 79 MMHG | SYSTOLIC BLOOD PRESSURE: 127 MMHG

## 2020-04-02 VITALS — DIASTOLIC BLOOD PRESSURE: 55 MMHG | SYSTOLIC BLOOD PRESSURE: 87 MMHG

## 2020-04-02 VITALS — SYSTOLIC BLOOD PRESSURE: 92 MMHG | DIASTOLIC BLOOD PRESSURE: 56 MMHG

## 2020-04-02 VITALS — DIASTOLIC BLOOD PRESSURE: 56 MMHG | SYSTOLIC BLOOD PRESSURE: 109 MMHG

## 2020-04-02 VITALS — SYSTOLIC BLOOD PRESSURE: 85 MMHG | DIASTOLIC BLOOD PRESSURE: 45 MMHG

## 2020-04-02 VITALS — DIASTOLIC BLOOD PRESSURE: 76 MMHG | SYSTOLIC BLOOD PRESSURE: 128 MMHG

## 2020-04-02 VITALS — DIASTOLIC BLOOD PRESSURE: 35 MMHG | SYSTOLIC BLOOD PRESSURE: 97 MMHG

## 2020-04-02 VITALS — SYSTOLIC BLOOD PRESSURE: 98 MMHG | DIASTOLIC BLOOD PRESSURE: 45 MMHG

## 2020-04-02 VITALS — DIASTOLIC BLOOD PRESSURE: 55 MMHG | SYSTOLIC BLOOD PRESSURE: 161 MMHG

## 2020-04-02 VITALS — DIASTOLIC BLOOD PRESSURE: 50 MMHG | SYSTOLIC BLOOD PRESSURE: 98 MMHG

## 2020-04-02 LAB
BASE EXCESS BLDA CALC-SCNC: 7.4 MMOL/L
BASOPHILS # BLD AUTO: 0.1 /CMM (ref 0–0.2)
BASOPHILS NFR BLD AUTO: 0.3 % (ref 0–2)
BUN SERPL-MCNC: 21 MG/DL (ref 7–18)
CALCIUM SERPL-MCNC: 8.3 MG/DL (ref 8.5–10.1)
CHLORIDE SERPL-SCNC: 113 MMOL/L (ref 98–107)
CO2 SERPL-SCNC: 33 MMOL/L (ref 21–32)
CREAT SERPL-MCNC: 0.8 MG/DL (ref 0.6–1.3)
DO-HGB MFR BLDA: 123.3 MMHG
EOSINOPHIL NFR BLD AUTO: 0 % (ref 0–6)
GLUCOSE SERPL-MCNC: 173 MG/DL (ref 74–106)
HCT VFR BLD AUTO: 34 % (ref 39–51)
HGB BLD-MCNC: 11 G/DL (ref 13.5–17.5)
INHALED O2 CONCENTRATION: 40 %
LYMPHOCYTES NFR BLD AUTO: 0.7 /CMM (ref 0.8–4.8)
LYMPHOCYTES NFR BLD AUTO: 3.2 % (ref 20–44)
MAGNESIUM SERPL-MCNC: 2.2 MG/DL (ref 1.8–2.4)
MCHC RBC AUTO-ENTMCNC: 33 G/DL (ref 31–36)
MCV RBC AUTO: 90 FL (ref 80–96)
MONOCYTES NFR BLD AUTO: 0.1 /CMM (ref 0.1–1.3)
MONOCYTES NFR BLD AUTO: 0.2 % (ref 2–12)
NEUTROPHILS # BLD AUTO: 22.2 /CMM (ref 1.8–8.9)
NEUTROPHILS NFR BLD AUTO: 96.3 % (ref 43–81)
PCO2 TEMP ADJ BLDA: 58.5 MMHG (ref 35–45)
PH TEMP ADJ BLDA: 7.38 [PH] (ref 7.35–7.45)
PLATELET # BLD AUTO: 123 /CMM (ref 150–450)
PO2 TEMP ADJ BLDA: 94.6 MMHG (ref 75–100)
POTASSIUM SERPL-SCNC: 4.2 MMOL/L (ref 3.5–5.1)
RBC # BLD AUTO: 3.74 MIL/UL (ref 4.5–6)
SAO2 % BLDA: 96.6 % (ref 92–98.5)
SODIUM SERPL-SCNC: 149 MMOL/L (ref 136–145)
VENTILATION MODE VENT: (no result)
WBC NRBC COR # BLD AUTO: 23.1 K/UL (ref 4.3–11)

## 2020-04-02 RX ADMIN — HYDROCORTISONE SODIUM SUCCINATE SCH MG: 100 INJECTION, POWDER, FOR SOLUTION INTRAMUSCULAR; INTRAVASCULAR at 16:28

## 2020-04-02 RX ADMIN — MEROPENEM SCH MLS/HR: 500 INJECTION INTRAVENOUS at 05:35

## 2020-04-02 RX ADMIN — ALBUTEROL SULFATE SCH PUFF: 90 AEROSOL, METERED RESPIRATORY (INHALATION) at 23:12

## 2020-04-02 RX ADMIN — Medication PRN ML: at 13:04

## 2020-04-02 RX ADMIN — ACETYLCYSTEINE SCH MG: 100 INHALANT RESPIRATORY (INHALATION) at 15:02

## 2020-04-02 RX ADMIN — ALBUTEROL SULFATE SCH PUFF: 90 AEROSOL, METERED RESPIRATORY (INHALATION) at 20:54

## 2020-04-02 RX ADMIN — MEROPENEM SCH MLS/HR: 1 INJECTION INTRAVENOUS at 13:05

## 2020-04-02 RX ADMIN — DOCUSATE SODIUM SCH MG: 50 LIQUID ORAL at 18:39

## 2020-04-02 RX ADMIN — MEROPENEM SCH MLS/HR: 1 INJECTION INTRAVENOUS at 21:20

## 2020-04-02 RX ADMIN — HYDROCORTISONE SODIUM SUCCINATE SCH MG: 100 INJECTION, POWDER, FOR SOLUTION INTRAMUSCULAR; INTRAVASCULAR at 08:50

## 2020-04-02 RX ADMIN — ACETYLCYSTEINE SCH MG: 100 INHALANT RESPIRATORY (INHALATION) at 07:34

## 2020-04-02 RX ADMIN — ACETYLCYSTEINE SCH MG: 100 INHALANT RESPIRATORY (INHALATION) at 23:10

## 2020-04-02 RX ADMIN — NITROGLYCERIN SCH GM: 20 OINTMENT TOPICAL at 08:53

## 2020-04-02 RX ADMIN — MUPIROCIN SCH APPLIC: 20 OINTMENT TOPICAL at 16:28

## 2020-04-02 RX ADMIN — ALBUTEROL SULFATE SCH PUFF: 90 AEROSOL, METERED RESPIRATORY (INHALATION) at 07:32

## 2020-04-02 RX ADMIN — Medication SCH OZ: at 08:52

## 2020-04-02 RX ADMIN — ACETAMINOPHEN PRN MG: 160 SOLUTION ORAL at 16:35

## 2020-04-02 RX ADMIN — MUPIROCIN SCH APPLIC: 20 OINTMENT TOPICAL at 08:53

## 2020-04-02 RX ADMIN — ALBUTEROL SULFATE SCH PUFF: 90 AEROSOL, METERED RESPIRATORY (INHALATION) at 13:32

## 2020-04-02 RX ADMIN — ACETAMINOPHEN PRN MG: 160 SOLUTION ORAL at 10:12

## 2020-04-02 RX ADMIN — NITROGLYCERIN SCH GM: 20 OINTMENT TOPICAL at 21:00

## 2020-04-02 RX ADMIN — PANTOPRAZOLE SODIUM SCH MG: 40 GRANULE, DELAYED RELEASE ORAL at 08:50

## 2020-04-02 NOTE — NUR
ICU RN NOTE



PATIENT TOLERATED BED BATH AT THIS TIME. PATIENT NOTED WITH 1 SMALL BM. WOUND CARE MEASURES 
RENDERED.  WILL CONTINUE TO MONITOR.

## 2020-04-02 NOTE — NUR
RN NOTE



Remained on Bipap. No any significant changes noted at this time. Kept clean, warm and dry. 
Needs attended.

## 2020-04-02 NOTE — NUR
ICU RN NOTE



PATIENT IS IN BED RESTING. ALL NEEDS ATTENDED AND MET. ALL DUE MEDS GIVEN AND TOLERATED 
WELL. PATIENT IS KEPT CLEAN, DRY, AND COMFORTABLE. WILL ENDORSE TO AM SHIFT RN FOR 
CONTINUATION OF CARE.

## 2020-04-02 NOTE — NUR
RN NOTE



0715: Received patient awake. Alert to name. On Bipap. Afib on the monitor. Right NGT 
intact, with gurgling sound during auscultation, feeding tolerated. Kept HOB elevated. With 
Luciano cath itnact, noted with very minimal UOP. BSW restraints for safety. On contact iso 
for MRSA nares, maintained and observed. 



0900: S/E by Dr. Coreas, aware for ABG. With order to try on simple massk.



0915: Placed on simple mask 96% sat but patient noted with AFib uncontrolled to 140's and 
increased WOB. Placed back Bipap, made MD aware. 



1120: No any significant changes noted at this time. Kept clean, warm and dry. Needs 
attended. Remained on Bipap.

## 2020-04-02 NOTE — NUR
RECEIVED PATIENT N BIPAP( 15/5, RATE=12,FIO2=40%), BREATHING REGULAR,BUT DEEP AND LABORED 
,SATURATING IN THE 90'S SO FAR,WILL CLOSELY MONITOR ALL NIGHT. LETHARGIC,OPENS EYES TO 
TACTILE AND VERBAL STIMULI,BUT DOES NOT SEEM TO COMPREHEND WELL,(DOES NOT SPEAK ENGLISH), 
BUT VERY WEAKLY ,BARELY MOVING ALL EXTREMITIES.+ GENERALIZED EDEMA, WEEPING ON BOTH UPPER 
EXTREMITIES.

ON TUBE FEEDING VIA NGT, NOTED A LOT OF FEEDING RESIDUAL (>500 ML), FEEDING HELD FOR NOW, 
WILL RECHECK RESIDUALS. ASPIRATION PRECAUTION IMPLEMENTED.

## 2020-04-03 VITALS — DIASTOLIC BLOOD PRESSURE: 52 MMHG | SYSTOLIC BLOOD PRESSURE: 87 MMHG

## 2020-04-03 VITALS — DIASTOLIC BLOOD PRESSURE: 53 MMHG | SYSTOLIC BLOOD PRESSURE: 105 MMHG

## 2020-04-03 VITALS — DIASTOLIC BLOOD PRESSURE: 60 MMHG | SYSTOLIC BLOOD PRESSURE: 109 MMHG

## 2020-04-03 VITALS — DIASTOLIC BLOOD PRESSURE: 55 MMHG | SYSTOLIC BLOOD PRESSURE: 93 MMHG

## 2020-04-03 VITALS — DIASTOLIC BLOOD PRESSURE: 32 MMHG | SYSTOLIC BLOOD PRESSURE: 103 MMHG

## 2020-04-03 VITALS — SYSTOLIC BLOOD PRESSURE: 90 MMHG | DIASTOLIC BLOOD PRESSURE: 47 MMHG

## 2020-04-03 VITALS — SYSTOLIC BLOOD PRESSURE: 96 MMHG | DIASTOLIC BLOOD PRESSURE: 51 MMHG

## 2020-04-03 VITALS — SYSTOLIC BLOOD PRESSURE: 93 MMHG | DIASTOLIC BLOOD PRESSURE: 41 MMHG

## 2020-04-03 VITALS — DIASTOLIC BLOOD PRESSURE: 44 MMHG | SYSTOLIC BLOOD PRESSURE: 77 MMHG

## 2020-04-03 VITALS — DIASTOLIC BLOOD PRESSURE: 62 MMHG | SYSTOLIC BLOOD PRESSURE: 98 MMHG

## 2020-04-03 VITALS — SYSTOLIC BLOOD PRESSURE: 93 MMHG | DIASTOLIC BLOOD PRESSURE: 77 MMHG

## 2020-04-03 VITALS — DIASTOLIC BLOOD PRESSURE: 56 MMHG | SYSTOLIC BLOOD PRESSURE: 94 MMHG

## 2020-04-03 VITALS — SYSTOLIC BLOOD PRESSURE: 105 MMHG | DIASTOLIC BLOOD PRESSURE: 58 MMHG

## 2020-04-03 VITALS — SYSTOLIC BLOOD PRESSURE: 116 MMHG | DIASTOLIC BLOOD PRESSURE: 74 MMHG

## 2020-04-03 VITALS — DIASTOLIC BLOOD PRESSURE: 48 MMHG | SYSTOLIC BLOOD PRESSURE: 94 MMHG

## 2020-04-03 VITALS — DIASTOLIC BLOOD PRESSURE: 57 MMHG | SYSTOLIC BLOOD PRESSURE: 109 MMHG

## 2020-04-03 VITALS — DIASTOLIC BLOOD PRESSURE: 59 MMHG | SYSTOLIC BLOOD PRESSURE: 98 MMHG

## 2020-04-03 VITALS — DIASTOLIC BLOOD PRESSURE: 73 MMHG | SYSTOLIC BLOOD PRESSURE: 139 MMHG

## 2020-04-03 VITALS — SYSTOLIC BLOOD PRESSURE: 84 MMHG | DIASTOLIC BLOOD PRESSURE: 48 MMHG

## 2020-04-03 VITALS — SYSTOLIC BLOOD PRESSURE: 73 MMHG | DIASTOLIC BLOOD PRESSURE: 30 MMHG

## 2020-04-03 VITALS — SYSTOLIC BLOOD PRESSURE: 111 MMHG | DIASTOLIC BLOOD PRESSURE: 62 MMHG

## 2020-04-03 VITALS — SYSTOLIC BLOOD PRESSURE: 99 MMHG | DIASTOLIC BLOOD PRESSURE: 57 MMHG

## 2020-04-03 VITALS — DIASTOLIC BLOOD PRESSURE: 54 MMHG | SYSTOLIC BLOOD PRESSURE: 90 MMHG

## 2020-04-03 VITALS — DIASTOLIC BLOOD PRESSURE: 67 MMHG | SYSTOLIC BLOOD PRESSURE: 105 MMHG

## 2020-04-03 VITALS — SYSTOLIC BLOOD PRESSURE: 101 MMHG | DIASTOLIC BLOOD PRESSURE: 50 MMHG

## 2020-04-03 VITALS — DIASTOLIC BLOOD PRESSURE: 63 MMHG | SYSTOLIC BLOOD PRESSURE: 106 MMHG

## 2020-04-03 VITALS — SYSTOLIC BLOOD PRESSURE: 97 MMHG | DIASTOLIC BLOOD PRESSURE: 53 MMHG

## 2020-04-03 VITALS — DIASTOLIC BLOOD PRESSURE: 56 MMHG | SYSTOLIC BLOOD PRESSURE: 103 MMHG

## 2020-04-03 VITALS — SYSTOLIC BLOOD PRESSURE: 83 MMHG | DIASTOLIC BLOOD PRESSURE: 57 MMHG

## 2020-04-03 LAB
APPEARANCE UR: (no result)
BASE EXCESS BLDA CALC-SCNC: 5.8 MMOL/L
BASOPHILS # BLD AUTO: 0 /CMM (ref 0–0.2)
BASOPHILS NFR BLD AUTO: 0 % (ref 0–2)
BILIRUB UR QL STRIP: (no result)
BUN SERPL-MCNC: 38 MG/DL (ref 7–18)
CALCIUM SERPL-MCNC: 8.5 MG/DL (ref 8.5–10.1)
CHLORIDE SERPL-SCNC: 116 MMOL/L (ref 98–107)
CO2 SERPL-SCNC: 35 MMOL/L (ref 21–32)
COLOR UR: (no result)
CREAT SERPL-MCNC: 1.5 MG/DL (ref 0.6–1.3)
CREAT UR-MCNC: 170.9 MG/DL (ref 30–125)
DEPRECATED SQUAMOUS URNS QL MICRO: (no result) /HPF
DO-HGB MFR BLDA: 180.9 MMHG
EOSINOPHIL NFR BLD AUTO: 0 % (ref 0–6)
EOSINOPHIL NFR BLD MANUAL: 1 % (ref 0–4)
GLUCOSE SERPL-MCNC: 107 MG/DL (ref 74–106)
GLUCOSE UR STRIP-MCNC: NEGATIVE MG/DL
HCT VFR BLD AUTO: 31 % (ref 39–51)
HGB BLD-MCNC: 9.7 G/DL (ref 13.5–17.5)
HGB UR QL STRIP: (no result) ERY/UL
INHALED O2 CONCENTRATION: 50 %
KETONES UR STRIP-MCNC: 15 MG/DL
LEUKOCYTE ESTERASE UR QL STRIP: (no result)
LYMPHOCYTES NFR BLD AUTO: 0.6 /CMM (ref 0.8–4.8)
LYMPHOCYTES NFR BLD AUTO: 2 % (ref 20–44)
LYMPHOCYTES NFR BLD MANUAL: 1 % (ref 16–48)
MAGNESIUM SERPL-MCNC: 2.6 MG/DL (ref 1.8–2.4)
MCHC RBC AUTO-ENTMCNC: 31 G/DL (ref 31–36)
MCV RBC AUTO: 91 FL (ref 80–96)
MONOCYTES NFR BLD AUTO: 0.6 /CMM (ref 0.1–1.3)
MONOCYTES NFR BLD AUTO: 2.1 % (ref 2–12)
MONOCYTES NFR BLD MANUAL: 4 % (ref 0–11)
MYELOCYTES NFR BLD MANUAL: 1 % (ref 0–0)
NEUTROPHILS # BLD AUTO: 27.5 /CMM (ref 1.8–8.9)
NEUTROPHILS NFR BLD AUTO: 95.9 % (ref 43–81)
NEUTS BAND NFR BLD MANUAL: 18 % (ref 0–5)
NEUTS SEG NFR BLD MANUAL: 75 % (ref 42–76)
NITRITE UR QL STRIP: POSITIVE
PCO2 TEMP ADJ BLDA: 74.6 MMHG (ref 35–45)
PH TEMP ADJ BLDA: 7.28 [PH] (ref 7.35–7.45)
PH UR STRIP: 5 [PH] (ref 5–8)
PLATELET # BLD AUTO: 80 /CMM (ref 150–450)
PO2 TEMP ADJ BLDA: 91.7 MMHG (ref 75–100)
POTASSIUM SERPL-SCNC: 4.2 MMOL/L (ref 3.5–5.1)
PROT UR QL STRIP: 100 MG/DL
PROT UR-MCNC: 391 MG/DL (ref 0–11.9)
RBC # BLD AUTO: 3.42 MIL/UL (ref 4.5–6)
RBC #/AREA URNS HPF: (no result) /HPF (ref 0–2)
SAO2 % BLDA: 96.2 % (ref 92–98.5)
SODIUM SERPL-SCNC: 153 MMOL/L (ref 136–145)
SODIUM UR-SCNC: 41 MMOL/L (ref 40–220)
UROBILINOGEN UR STRIP-MCNC: 1 EU/DL
VENTILATION MODE VENT: (no result)
WBC #/AREA URNS HPF: (no result) /HPF
WBC #/AREA URNS HPF: (no result) /HPF (ref 0–3)
WBC NRBC COR # BLD AUTO: 28.7 K/UL (ref 4.3–11)

## 2020-04-03 RX ADMIN — NITROGLYCERIN SCH GM: 20 OINTMENT TOPICAL at 08:55

## 2020-04-03 RX ADMIN — ACETYLCYSTEINE SCH MG: 100 INHALANT RESPIRATORY (INHALATION) at 23:29

## 2020-04-03 RX ADMIN — DOCUSATE SODIUM SCH MG: 50 LIQUID ORAL at 21:19

## 2020-04-03 RX ADMIN — Medication SCH OZ: at 08:53

## 2020-04-03 RX ADMIN — ALBUTEROL SULFATE SCH PUFF: 90 AEROSOL, METERED RESPIRATORY (INHALATION) at 20:08

## 2020-04-03 RX ADMIN — MUPIROCIN SCH APPLIC: 20 OINTMENT TOPICAL at 08:53

## 2020-04-03 RX ADMIN — HYDROCORTISONE SODIUM SUCCINATE SCH MG: 100 INJECTION, POWDER, FOR SOLUTION INTRAMUSCULAR; INTRAVASCULAR at 17:07

## 2020-04-03 RX ADMIN — MUPIROCIN SCH APPLIC: 20 OINTMENT TOPICAL at 17:07

## 2020-04-03 RX ADMIN — PANTOPRAZOLE SODIUM SCH MG: 40 GRANULE, DELAYED RELEASE ORAL at 08:53

## 2020-04-03 RX ADMIN — ALBUTEROL SULFATE SCH PUFF: 90 AEROSOL, METERED RESPIRATORY (INHALATION) at 23:30

## 2020-04-03 RX ADMIN — METOCLOPRAMIDE HYDROCHLORIDE SCH MG: 5 SOLUTION ORAL at 23:33

## 2020-04-03 RX ADMIN — ALBUTEROL SULFATE SCH PUFF: 90 AEROSOL, METERED RESPIRATORY (INHALATION) at 12:00

## 2020-04-03 RX ADMIN — HYDROCORTISONE SODIUM SUCCINATE SCH MG: 100 INJECTION, POWDER, FOR SOLUTION INTRAMUSCULAR; INTRAVASCULAR at 08:53

## 2020-04-03 RX ADMIN — ACETYLCYSTEINE SCH MG: 100 INHALANT RESPIRATORY (INHALATION) at 17:15

## 2020-04-03 RX ADMIN — METOCLOPRAMIDE HYDROCHLORIDE SCH MG: 5 SOLUTION ORAL at 12:28

## 2020-04-03 RX ADMIN — SODIUM CHLORIDE PRN MLS/HR: 9 INJECTION, SOLUTION INTRAVENOUS at 22:47

## 2020-04-03 RX ADMIN — METOCLOPRAMIDE HYDROCHLORIDE SCH MG: 5 SOLUTION ORAL at 17:07

## 2020-04-03 RX ADMIN — ALBUTEROL SULFATE SCH PUFF: 90 AEROSOL, METERED RESPIRATORY (INHALATION) at 05:40

## 2020-04-03 RX ADMIN — ACETYLCYSTEINE SCH MG: 100 INHALANT RESPIRATORY (INHALATION) at 07:35

## 2020-04-03 RX ADMIN — MEROPENEM SCH MLS/HR: 1 INJECTION INTRAVENOUS at 05:13

## 2020-04-03 RX ADMIN — MEROPENEM SCH MLS/HR: 1 INJECTION INTRAVENOUS at 12:29

## 2020-04-03 RX ADMIN — MEROPENEM SCH MLS/HR: 1 INJECTION INTRAVENOUS at 20:36

## 2020-04-03 NOTE — NUR
RCVD PT ON BIPAP 15/5, RATE 12, 40%.  BREATHING TX GIVEN PER MD'S ORDER . NO ADVERSE 
REACTION NOTED. NO RESPIRATORY DISTRESS NOTED AT THIS TIME .  WILL CONTINUE TO MONITOR THE 
PT T/O THE SHIFT.

## 2020-04-03 NOTE — NUR
ICU RN NOTE



RECEIVED PT ON BIPAP, LETHARGIC AND WITHDRAWING FROM PAIN.  BREATHING UNLABORED AND 
TOLERATING BIPAP SETTINGS. ON ASPIRATION PRECAUTIONS. TELE- SR AND AFIB CONTROLLED. 
ISOLATION PRECAUTIONS MAINTAINED. NGT IN PLACE WITH POSITIVE PLACEMENT AND HIGH RESIDUALS 
[id number] NOTED. KRAUSE CATHETER IN PLACE AND DRAINING BY GRAVITY.  WILL MONITOR.

## 2020-04-03 NOTE — NUR
STILL WITH A LOT OF FEEDING RESIDUALS,FEEDING STILL ON HOLD. VOMITED BROWNISH GASTRIC 
MATERIALS WHILE TURNING SIDE TO SIDE DURING AM CARE, ASPIRATION PRECAUTION OBSERVED. 
CONNECTED NGT TO SUCTION ,OBTAINED 500 ML BROWNISH  GASTRIC MATERIALS. CONTINUE TO HOLD TUBE 
FEEDING AND FOLLOW UP WITH MD IF WANTS TO CONTINUE WITH FEEDING.

## 2020-04-03 NOTE — NUR
RN NOTE



0715: received patient on Bipap. Alert to name. With right NGT intact, noted with gt 
residuals, 200mL, paused feeding for now. LUKE midline intact. With Castelan cath intact, noted 
with very minimal UOP. 



0845: S/E by Dr. Coreas, with order to do ABG off Bipap, RT paced patient on venturi mask.



1045: ABG resulted, MD ordered to place back on Bipap.



1130: Patient noted with Afib RVR up to 170's, made Dr. Johnson aware and also informed MD, 
patient was placed off Bipap and just placed back on, Cardio ordered for Amio load and drip. 
Carried out.



1500: Still low UOP, 40mL since AM, flushed castelan but still no output. Mad Dr. Villarreal 
aware. BU/Cr 38/1.5, no new order for now. Stopped Amio drip for episode of 40's HR. Will 
continue to monitor. MD also aware for episodes of SBP 80's at times.



1530: Cleaned patient, noted with x1 vomiting, and small amount of dark red BM, made Dr. Malone aware. H/H 9.7/31. GTF still on pause. Kept HOB elevated.

## 2020-04-03 NOTE — NUR
REMAINS STABLE, NOTED MILD SOB ON EXERTION.FEVER RELIEVED. WILL CONTINUE TO MONITOR 
OXYGENATION .

-------------------------------------------------------------------------------

Addendum: 04/03/20 at 0247 by JENNI FERNANDEZ RN

-------------------------------------------------------------------------------

PLEASE DISREGARD ABOVE NOTE, WRONG PATIENT.

## 2020-04-03 NOTE — NUR
BREATHING LESS LABORED,CALM ,AND SEEMS MORE RESPONSIVE ,EASIER TO AWAKEN.STILL WITH HIGH 
FEEDING RESIDUAL

## 2020-04-03 NOTE — NUR
ICU RN NOTE



PT SBP 60-70'S. NOTIFIED ON CALL  WITH ORDERS TO START LEVOPHED DRIP AND KEEP 
MAP >65. ORDERS NOTED AND CARRIED OUT. WILL MONITOR.

## 2020-04-03 NOTE — NUR
REMAINS LETHARGIC ON BIPAP, TOLERATING  FIO2 OF 40%,STILL WITH LABORED BREATHING BUT NOT IN 
ANY ACUTE RESPIRATORY DISTRESS.RESPONSIVE TO PAIN AND VERBAL STIMULUS,BUT NOT FOLLOWING 
COMMANDS.

## 2020-04-03 NOTE — NUR
WILL ATTEMPT TO FEED AGAIN,RESUMED AT 30 ML /HR THEN CHECK RESIDUALS. REPORT GIVEN TO CECILIA GALLAGHER

## 2020-04-04 VITALS — SYSTOLIC BLOOD PRESSURE: 126 MMHG | DIASTOLIC BLOOD PRESSURE: 65 MMHG

## 2020-04-04 VITALS — SYSTOLIC BLOOD PRESSURE: 104 MMHG | DIASTOLIC BLOOD PRESSURE: 50 MMHG

## 2020-04-04 VITALS — DIASTOLIC BLOOD PRESSURE: 49 MMHG | SYSTOLIC BLOOD PRESSURE: 95 MMHG

## 2020-04-04 VITALS — SYSTOLIC BLOOD PRESSURE: 122 MMHG | DIASTOLIC BLOOD PRESSURE: 63 MMHG

## 2020-04-04 VITALS — DIASTOLIC BLOOD PRESSURE: 73 MMHG | SYSTOLIC BLOOD PRESSURE: 130 MMHG

## 2020-04-04 VITALS — SYSTOLIC BLOOD PRESSURE: 109 MMHG | DIASTOLIC BLOOD PRESSURE: 53 MMHG

## 2020-04-04 VITALS — DIASTOLIC BLOOD PRESSURE: 127 MMHG | SYSTOLIC BLOOD PRESSURE: 147 MMHG

## 2020-04-04 VITALS — DIASTOLIC BLOOD PRESSURE: 48 MMHG | SYSTOLIC BLOOD PRESSURE: 88 MMHG

## 2020-04-04 VITALS — SYSTOLIC BLOOD PRESSURE: 111 MMHG | DIASTOLIC BLOOD PRESSURE: 73 MMHG

## 2020-04-04 VITALS — SYSTOLIC BLOOD PRESSURE: 101 MMHG | DIASTOLIC BLOOD PRESSURE: 70 MMHG

## 2020-04-04 VITALS — SYSTOLIC BLOOD PRESSURE: 113 MMHG | DIASTOLIC BLOOD PRESSURE: 59 MMHG

## 2020-04-04 VITALS — SYSTOLIC BLOOD PRESSURE: 86 MMHG | DIASTOLIC BLOOD PRESSURE: 63 MMHG

## 2020-04-04 VITALS — SYSTOLIC BLOOD PRESSURE: 94 MMHG | DIASTOLIC BLOOD PRESSURE: 60 MMHG

## 2020-04-04 VITALS — SYSTOLIC BLOOD PRESSURE: 101 MMHG | DIASTOLIC BLOOD PRESSURE: 60 MMHG

## 2020-04-04 VITALS — DIASTOLIC BLOOD PRESSURE: 43 MMHG | SYSTOLIC BLOOD PRESSURE: 77 MMHG

## 2020-04-04 VITALS — SYSTOLIC BLOOD PRESSURE: 138 MMHG | DIASTOLIC BLOOD PRESSURE: 68 MMHG

## 2020-04-04 VITALS — DIASTOLIC BLOOD PRESSURE: 91 MMHG | SYSTOLIC BLOOD PRESSURE: 119 MMHG

## 2020-04-04 VITALS — DIASTOLIC BLOOD PRESSURE: 69 MMHG | SYSTOLIC BLOOD PRESSURE: 122 MMHG

## 2020-04-04 VITALS — DIASTOLIC BLOOD PRESSURE: 52 MMHG | SYSTOLIC BLOOD PRESSURE: 136 MMHG

## 2020-04-04 VITALS — SYSTOLIC BLOOD PRESSURE: 109 MMHG | DIASTOLIC BLOOD PRESSURE: 57 MMHG

## 2020-04-04 VITALS — SYSTOLIC BLOOD PRESSURE: 93 MMHG | DIASTOLIC BLOOD PRESSURE: 27 MMHG

## 2020-04-04 VITALS — DIASTOLIC BLOOD PRESSURE: 55 MMHG | SYSTOLIC BLOOD PRESSURE: 110 MMHG

## 2020-04-04 VITALS — SYSTOLIC BLOOD PRESSURE: 110 MMHG | DIASTOLIC BLOOD PRESSURE: 78 MMHG

## 2020-04-04 VITALS — DIASTOLIC BLOOD PRESSURE: 64 MMHG | SYSTOLIC BLOOD PRESSURE: 123 MMHG

## 2020-04-04 VITALS — DIASTOLIC BLOOD PRESSURE: 48 MMHG | SYSTOLIC BLOOD PRESSURE: 91 MMHG

## 2020-04-04 VITALS — SYSTOLIC BLOOD PRESSURE: 98 MMHG | DIASTOLIC BLOOD PRESSURE: 57 MMHG

## 2020-04-04 VITALS — DIASTOLIC BLOOD PRESSURE: 56 MMHG | SYSTOLIC BLOOD PRESSURE: 112 MMHG

## 2020-04-04 VITALS — SYSTOLIC BLOOD PRESSURE: 87 MMHG | DIASTOLIC BLOOD PRESSURE: 45 MMHG

## 2020-04-04 VITALS — DIASTOLIC BLOOD PRESSURE: 82 MMHG | SYSTOLIC BLOOD PRESSURE: 108 MMHG

## 2020-04-04 VITALS — SYSTOLIC BLOOD PRESSURE: 114 MMHG | DIASTOLIC BLOOD PRESSURE: 65 MMHG

## 2020-04-04 VITALS — DIASTOLIC BLOOD PRESSURE: 60 MMHG | SYSTOLIC BLOOD PRESSURE: 81 MMHG

## 2020-04-04 VITALS — SYSTOLIC BLOOD PRESSURE: 102 MMHG | DIASTOLIC BLOOD PRESSURE: 74 MMHG

## 2020-04-04 VITALS — DIASTOLIC BLOOD PRESSURE: 50 MMHG | SYSTOLIC BLOOD PRESSURE: 98 MMHG

## 2020-04-04 VITALS — SYSTOLIC BLOOD PRESSURE: 147 MMHG | DIASTOLIC BLOOD PRESSURE: 59 MMHG

## 2020-04-04 VITALS — SYSTOLIC BLOOD PRESSURE: 76 MMHG | DIASTOLIC BLOOD PRESSURE: 41 MMHG

## 2020-04-04 VITALS — SYSTOLIC BLOOD PRESSURE: 75 MMHG | DIASTOLIC BLOOD PRESSURE: 30 MMHG

## 2020-04-04 VITALS — DIASTOLIC BLOOD PRESSURE: 105 MMHG | SYSTOLIC BLOOD PRESSURE: 156 MMHG

## 2020-04-04 VITALS — DIASTOLIC BLOOD PRESSURE: 41 MMHG | SYSTOLIC BLOOD PRESSURE: 91 MMHG

## 2020-04-04 VITALS — SYSTOLIC BLOOD PRESSURE: 119 MMHG | DIASTOLIC BLOOD PRESSURE: 72 MMHG

## 2020-04-04 VITALS — SYSTOLIC BLOOD PRESSURE: 116 MMHG | DIASTOLIC BLOOD PRESSURE: 58 MMHG

## 2020-04-04 VITALS — DIASTOLIC BLOOD PRESSURE: 67 MMHG | SYSTOLIC BLOOD PRESSURE: 114 MMHG

## 2020-04-04 VITALS — SYSTOLIC BLOOD PRESSURE: 113 MMHG | DIASTOLIC BLOOD PRESSURE: 74 MMHG

## 2020-04-04 VITALS — DIASTOLIC BLOOD PRESSURE: 75 MMHG | SYSTOLIC BLOOD PRESSURE: 125 MMHG

## 2020-04-04 VITALS — DIASTOLIC BLOOD PRESSURE: 58 MMHG | SYSTOLIC BLOOD PRESSURE: 93 MMHG

## 2020-04-04 VITALS — SYSTOLIC BLOOD PRESSURE: 89 MMHG | DIASTOLIC BLOOD PRESSURE: 50 MMHG

## 2020-04-04 VITALS — DIASTOLIC BLOOD PRESSURE: 64 MMHG | SYSTOLIC BLOOD PRESSURE: 112 MMHG

## 2020-04-04 VITALS — DIASTOLIC BLOOD PRESSURE: 45 MMHG | SYSTOLIC BLOOD PRESSURE: 105 MMHG

## 2020-04-04 VITALS — SYSTOLIC BLOOD PRESSURE: 108 MMHG | DIASTOLIC BLOOD PRESSURE: 35 MMHG

## 2020-04-04 VITALS — DIASTOLIC BLOOD PRESSURE: 65 MMHG | SYSTOLIC BLOOD PRESSURE: 109 MMHG

## 2020-04-04 VITALS — SYSTOLIC BLOOD PRESSURE: 95 MMHG | DIASTOLIC BLOOD PRESSURE: 38 MMHG

## 2020-04-04 VITALS — DIASTOLIC BLOOD PRESSURE: 65 MMHG | SYSTOLIC BLOOD PRESSURE: 129 MMHG

## 2020-04-04 VITALS — SYSTOLIC BLOOD PRESSURE: 90 MMHG | DIASTOLIC BLOOD PRESSURE: 45 MMHG

## 2020-04-04 VITALS — SYSTOLIC BLOOD PRESSURE: 125 MMHG | DIASTOLIC BLOOD PRESSURE: 70 MMHG

## 2020-04-04 VITALS — DIASTOLIC BLOOD PRESSURE: 86 MMHG | SYSTOLIC BLOOD PRESSURE: 149 MMHG

## 2020-04-04 VITALS — DIASTOLIC BLOOD PRESSURE: 63 MMHG | SYSTOLIC BLOOD PRESSURE: 111 MMHG

## 2020-04-04 VITALS — DIASTOLIC BLOOD PRESSURE: 59 MMHG | SYSTOLIC BLOOD PRESSURE: 121 MMHG

## 2020-04-04 VITALS — SYSTOLIC BLOOD PRESSURE: 73 MMHG | DIASTOLIC BLOOD PRESSURE: 54 MMHG

## 2020-04-04 VITALS — SYSTOLIC BLOOD PRESSURE: 115 MMHG | DIASTOLIC BLOOD PRESSURE: 67 MMHG

## 2020-04-04 VITALS — DIASTOLIC BLOOD PRESSURE: 35 MMHG | SYSTOLIC BLOOD PRESSURE: 86 MMHG

## 2020-04-04 VITALS — SYSTOLIC BLOOD PRESSURE: 121 MMHG | DIASTOLIC BLOOD PRESSURE: 70 MMHG

## 2020-04-04 LAB
ALBUMIN SERPL BCP-MCNC: 1 G/DL (ref 3.4–5)
ALP SERPL-CCNC: 189 U/L (ref 46–116)
ALT SERPL W P-5'-P-CCNC: 30 U/L (ref 12–78)
AST SERPL W P-5'-P-CCNC: 61 U/L (ref 15–37)
BASOPHILS # BLD AUTO: 0 /CMM (ref 0–0.2)
BASOPHILS NFR BLD AUTO: 0.1 % (ref 0–2)
BILIRUB SERPL-MCNC: 0.7 MG/DL (ref 0.2–1)
BUN SERPL-MCNC: 50 MG/DL (ref 7–18)
CALCIUM SERPL-MCNC: 9.1 MG/DL (ref 8.5–10.1)
CHLORIDE SERPL-SCNC: 113 MMOL/L (ref 98–107)
CK SERPL-CCNC: 102 U/L (ref 39–308)
CO2 SERPL-SCNC: 32 MMOL/L (ref 21–32)
CREAT SERPL-MCNC: 2 MG/DL (ref 0.6–1.3)
EOSINOPHIL NFR BLD AUTO: 0 % (ref 0–6)
GLUCOSE SERPL-MCNC: 65 MG/DL (ref 74–106)
HCT VFR BLD AUTO: 36 % (ref 39–51)
HGB BLD-MCNC: 11.1 G/DL (ref 13.5–17.5)
LYMPHOCYTES NFR BLD AUTO: 0.6 /CMM (ref 0.8–4.8)
LYMPHOCYTES NFR BLD AUTO: 2 % (ref 20–44)
MAGNESIUM SERPL-MCNC: 2.8 MG/DL (ref 1.8–2.4)
MCHC RBC AUTO-ENTMCNC: 31 G/DL (ref 31–36)
MCV RBC AUTO: 91 FL (ref 80–96)
MONOCYTES NFR BLD AUTO: 0.1 /CMM (ref 0.1–1.3)
MONOCYTES NFR BLD AUTO: 0.5 % (ref 2–12)
NEUTROPHILS # BLD AUTO: 27.9 /CMM (ref 1.8–8.9)
NEUTROPHILS NFR BLD AUTO: 97.4 % (ref 43–81)
PHOSPHATE SERPL-MCNC: 4.2 MG/DL (ref 2.5–4.9)
PLATELET # BLD AUTO: 101 /CMM (ref 150–450)
POTASSIUM SERPL-SCNC: 5.9 MMOL/L (ref 3.5–5.1)
PROT SERPL-MCNC: 5.5 G/DL (ref 6.4–8.2)
RBC # BLD AUTO: 3.94 MIL/UL (ref 4.5–6)
SODIUM SERPL-SCNC: 150 MMOL/L (ref 136–145)
WBC NRBC COR # BLD AUTO: 28.6 K/UL (ref 4.3–11)

## 2020-04-04 RX ADMIN — PANTOPRAZOLE SODIUM SCH MG: 40 GRANULE, DELAYED RELEASE ORAL at 08:34

## 2020-04-04 RX ADMIN — MEROPENEM SCH MLS/HR: 1 INJECTION INTRAVENOUS at 04:21

## 2020-04-04 RX ADMIN — HYDROCORTISONE SODIUM SUCCINATE SCH MG: 100 INJECTION, POWDER, FOR SOLUTION INTRAMUSCULAR; INTRAVASCULAR at 08:34

## 2020-04-04 RX ADMIN — ACETYLCYSTEINE SCH MG: 100 INHALANT RESPIRATORY (INHALATION) at 23:56

## 2020-04-04 RX ADMIN — METOCLOPRAMIDE HYDROCHLORIDE SCH MG: 5 SOLUTION ORAL at 05:56

## 2020-04-04 RX ADMIN — MUPIROCIN SCH APPLIC: 20 OINTMENT TOPICAL at 17:00

## 2020-04-04 RX ADMIN — ACETYLCYSTEINE SCH MG: 100 INHALANT RESPIRATORY (INHALATION) at 07:37

## 2020-04-04 RX ADMIN — ALBUTEROL SULFATE SCH PUFF: 90 AEROSOL, METERED RESPIRATORY (INHALATION) at 05:29

## 2020-04-04 RX ADMIN — DOCUSATE SODIUM SCH MG: 50 LIQUID ORAL at 21:01

## 2020-04-04 RX ADMIN — MUPIROCIN SCH APPLIC: 20 OINTMENT TOPICAL at 09:00

## 2020-04-04 RX ADMIN — DIGOXIN SCH MG: 250 INJECTION, SOLUTION INTRAMUSCULAR; INTRAVENOUS at 13:50

## 2020-04-04 RX ADMIN — METOCLOPRAMIDE HYDROCHLORIDE SCH MG: 5 SOLUTION ORAL at 17:01

## 2020-04-04 RX ADMIN — ALBUTEROL SULFATE SCH PUFF: 90 AEROSOL, METERED RESPIRATORY (INHALATION) at 13:50

## 2020-04-04 RX ADMIN — Medication SCH OZ: at 08:37

## 2020-04-04 RX ADMIN — METOCLOPRAMIDE HYDROCHLORIDE SCH MG: 5 SOLUTION ORAL at 12:56

## 2020-04-04 RX ADMIN — METOCLOPRAMIDE HYDROCHLORIDE SCH MG: 5 SOLUTION ORAL at 23:45

## 2020-04-04 RX ADMIN — DIGOXIN SCH MG: 250 INJECTION, SOLUTION INTRAMUSCULAR; INTRAVENOUS at 08:36

## 2020-04-04 RX ADMIN — HYDROCORTISONE SODIUM SUCCINATE SCH MG: 100 INJECTION, POWDER, FOR SOLUTION INTRAMUSCULAR; INTRAVASCULAR at 17:00

## 2020-04-04 RX ADMIN — SODIUM CHLORIDE SCH MLS/HR: 9 INJECTION, SOLUTION INTRAVENOUS at 20:59

## 2020-04-04 RX ADMIN — ACETYLCYSTEINE SCH MG: 100 INHALANT RESPIRATORY (INHALATION) at 15:30

## 2020-04-04 RX ADMIN — DIGOXIN SCH MG: 250 INJECTION, SOLUTION INTRAMUSCULAR; INTRAVENOUS at 21:01

## 2020-04-04 RX ADMIN — ALBUTEROL SULFATE SCH PUFF: 90 AEROSOL, METERED RESPIRATORY (INHALATION) at 17:01

## 2020-04-04 NOTE — NUR
ICU RN. INITIAL ASSESSMENT. RECEIVED THE PT REST ON THE BED, BIPAP ON. PT IS NOT RESPONDING 
PAIN. PT IS OBTUNDED. BIPAP SETTINGS 15/5,RATE IS 12,FIO2 45,%. SAT 97%. CARDIAC MONITOR 
SHOWING AFIB. RATE . GT INTACT. CLAMPED. FC PATENT. ANURIC, HOB ELEVATED. PT IS 
UNSTABLE. PAT HAND WEEPING, PT IS UNSTABLE, WILL CONTINUE TO MONITOR VITALS.

## 2020-04-04 NOTE — NUR
ICU RN NOTES CLOSING

PATIENT IN BED LETHARGIC ON BIPAP, SATURATING 94% . COMFORTABLE IN BED. NO SOB OR PAIN 
NOTED AT THIS TIME. OUT PUT 50 CC TOTAL. ALL NEEDS ATTENDED. BED AT THE LOWEST POSITION 
LOCKED. STILL NPO EXCEPT MEDS, WITH RESIDUAL > 100 CC , GREEN GASTRIC CONTENT. ENDORSED TO 
NIGHT SHIFT NURSE FOR TREVOR.

## 2020-04-04 NOTE — NUR
ICU RN NOTES

INFORMED NP  ANANT ABOUT THE LAB RESULTS. PATIENT OUTPUT AND PATIENT OFF FOR TUBE FEEDING. 
NP AWARE.

## 2020-04-04 NOTE — NUR
ICU RN NOTES

PATIENT IN BED OBTUNDED, ON BIPAP SATURATING 95%. CALM NO FACIAL GRIMACING. NO SOB OR 
DISCOMFORT NOTED AT THIS TIME. NG TUBE SITE RESIDUAL 100 CC GREEN COLOR. PATIENT FEEDING IS 
STOPPED FROM PREVIOUS SHIFT. PT HAS PATENT MIDLINE. ON TKO AND LEVOPHED 0.3. PATIENT ON 
KRAUSE. CALL LIGHT WITHIN REACH BED AT THE LOWEST POSITION LOCKED. WILL CONTINUE TO MONITOR 
THE PATIENT.

## 2020-04-05 VITALS — DIASTOLIC BLOOD PRESSURE: 44 MMHG | SYSTOLIC BLOOD PRESSURE: 90 MMHG

## 2020-04-05 VITALS — SYSTOLIC BLOOD PRESSURE: 92 MMHG | DIASTOLIC BLOOD PRESSURE: 51 MMHG

## 2020-04-05 VITALS — SYSTOLIC BLOOD PRESSURE: 111 MMHG | DIASTOLIC BLOOD PRESSURE: 58 MMHG

## 2020-04-05 VITALS — SYSTOLIC BLOOD PRESSURE: 95 MMHG | DIASTOLIC BLOOD PRESSURE: 43 MMHG

## 2020-04-05 VITALS — SYSTOLIC BLOOD PRESSURE: 94 MMHG | DIASTOLIC BLOOD PRESSURE: 50 MMHG

## 2020-04-05 VITALS — SYSTOLIC BLOOD PRESSURE: 85 MMHG | DIASTOLIC BLOOD PRESSURE: 46 MMHG

## 2020-04-05 VITALS — SYSTOLIC BLOOD PRESSURE: 89 MMHG | DIASTOLIC BLOOD PRESSURE: 55 MMHG

## 2020-04-05 VITALS — SYSTOLIC BLOOD PRESSURE: 91 MMHG | DIASTOLIC BLOOD PRESSURE: 46 MMHG

## 2020-04-05 VITALS — SYSTOLIC BLOOD PRESSURE: 99 MMHG | DIASTOLIC BLOOD PRESSURE: 56 MMHG

## 2020-04-05 VITALS — SYSTOLIC BLOOD PRESSURE: 94 MMHG | DIASTOLIC BLOOD PRESSURE: 48 MMHG

## 2020-04-05 VITALS — SYSTOLIC BLOOD PRESSURE: 102 MMHG | DIASTOLIC BLOOD PRESSURE: 53 MMHG

## 2020-04-05 VITALS — SYSTOLIC BLOOD PRESSURE: 112 MMHG | DIASTOLIC BLOOD PRESSURE: 64 MMHG

## 2020-04-05 VITALS — SYSTOLIC BLOOD PRESSURE: 93 MMHG | DIASTOLIC BLOOD PRESSURE: 48 MMHG

## 2020-04-05 VITALS — SYSTOLIC BLOOD PRESSURE: 114 MMHG | DIASTOLIC BLOOD PRESSURE: 60 MMHG

## 2020-04-05 VITALS — DIASTOLIC BLOOD PRESSURE: 64 MMHG | SYSTOLIC BLOOD PRESSURE: 137 MMHG

## 2020-04-05 VITALS — DIASTOLIC BLOOD PRESSURE: 46 MMHG | SYSTOLIC BLOOD PRESSURE: 91 MMHG

## 2020-04-05 VITALS — SYSTOLIC BLOOD PRESSURE: 82 MMHG | DIASTOLIC BLOOD PRESSURE: 44 MMHG

## 2020-04-05 VITALS — SYSTOLIC BLOOD PRESSURE: 86 MMHG | DIASTOLIC BLOOD PRESSURE: 46 MMHG

## 2020-04-05 VITALS — DIASTOLIC BLOOD PRESSURE: 48 MMHG | SYSTOLIC BLOOD PRESSURE: 100 MMHG

## 2020-04-05 VITALS — SYSTOLIC BLOOD PRESSURE: 143 MMHG | DIASTOLIC BLOOD PRESSURE: 88 MMHG

## 2020-04-05 VITALS — DIASTOLIC BLOOD PRESSURE: 43 MMHG | SYSTOLIC BLOOD PRESSURE: 78 MMHG

## 2020-04-05 VITALS — DIASTOLIC BLOOD PRESSURE: 52 MMHG | SYSTOLIC BLOOD PRESSURE: 102 MMHG

## 2020-04-05 VITALS — DIASTOLIC BLOOD PRESSURE: 61 MMHG | SYSTOLIC BLOOD PRESSURE: 100 MMHG

## 2020-04-05 VITALS — SYSTOLIC BLOOD PRESSURE: 89 MMHG | DIASTOLIC BLOOD PRESSURE: 46 MMHG

## 2020-04-05 VITALS — SYSTOLIC BLOOD PRESSURE: 93 MMHG | DIASTOLIC BLOOD PRESSURE: 47 MMHG

## 2020-04-05 VITALS — DIASTOLIC BLOOD PRESSURE: 54 MMHG | SYSTOLIC BLOOD PRESSURE: 124 MMHG

## 2020-04-05 VITALS — SYSTOLIC BLOOD PRESSURE: 102 MMHG | DIASTOLIC BLOOD PRESSURE: 54 MMHG

## 2020-04-05 VITALS — SYSTOLIC BLOOD PRESSURE: 134 MMHG | DIASTOLIC BLOOD PRESSURE: 103 MMHG

## 2020-04-05 VITALS — SYSTOLIC BLOOD PRESSURE: 98 MMHG | DIASTOLIC BLOOD PRESSURE: 65 MMHG

## 2020-04-05 VITALS — SYSTOLIC BLOOD PRESSURE: 101 MMHG | DIASTOLIC BLOOD PRESSURE: 48 MMHG

## 2020-04-05 VITALS — DIASTOLIC BLOOD PRESSURE: 38 MMHG | SYSTOLIC BLOOD PRESSURE: 81 MMHG

## 2020-04-05 VITALS — SYSTOLIC BLOOD PRESSURE: 95 MMHG | DIASTOLIC BLOOD PRESSURE: 47 MMHG

## 2020-04-05 VITALS — DIASTOLIC BLOOD PRESSURE: 54 MMHG | SYSTOLIC BLOOD PRESSURE: 99 MMHG

## 2020-04-05 VITALS — DIASTOLIC BLOOD PRESSURE: 34 MMHG | SYSTOLIC BLOOD PRESSURE: 72 MMHG

## 2020-04-05 VITALS — SYSTOLIC BLOOD PRESSURE: 129 MMHG | DIASTOLIC BLOOD PRESSURE: 51 MMHG

## 2020-04-05 VITALS — SYSTOLIC BLOOD PRESSURE: 90 MMHG | DIASTOLIC BLOOD PRESSURE: 52 MMHG

## 2020-04-05 VITALS — SYSTOLIC BLOOD PRESSURE: 101 MMHG | DIASTOLIC BLOOD PRESSURE: 50 MMHG

## 2020-04-05 VITALS — DIASTOLIC BLOOD PRESSURE: 71 MMHG | SYSTOLIC BLOOD PRESSURE: 99 MMHG

## 2020-04-05 VITALS — SYSTOLIC BLOOD PRESSURE: 96 MMHG | DIASTOLIC BLOOD PRESSURE: 74 MMHG

## 2020-04-05 VITALS — DIASTOLIC BLOOD PRESSURE: 47 MMHG | SYSTOLIC BLOOD PRESSURE: 83 MMHG

## 2020-04-05 VITALS — SYSTOLIC BLOOD PRESSURE: 96 MMHG | DIASTOLIC BLOOD PRESSURE: 73 MMHG

## 2020-04-05 VITALS — SYSTOLIC BLOOD PRESSURE: 90 MMHG | DIASTOLIC BLOOD PRESSURE: 53 MMHG

## 2020-04-05 VITALS — DIASTOLIC BLOOD PRESSURE: 46 MMHG | SYSTOLIC BLOOD PRESSURE: 82 MMHG

## 2020-04-05 VITALS — SYSTOLIC BLOOD PRESSURE: 92 MMHG | DIASTOLIC BLOOD PRESSURE: 73 MMHG

## 2020-04-05 VITALS — SYSTOLIC BLOOD PRESSURE: 93 MMHG | DIASTOLIC BLOOD PRESSURE: 45 MMHG

## 2020-04-05 VITALS — DIASTOLIC BLOOD PRESSURE: 37 MMHG | SYSTOLIC BLOOD PRESSURE: 88 MMHG

## 2020-04-05 VITALS — DIASTOLIC BLOOD PRESSURE: 59 MMHG | SYSTOLIC BLOOD PRESSURE: 118 MMHG

## 2020-04-05 VITALS — SYSTOLIC BLOOD PRESSURE: 111 MMHG | DIASTOLIC BLOOD PRESSURE: 48 MMHG

## 2020-04-05 VITALS — DIASTOLIC BLOOD PRESSURE: 50 MMHG | SYSTOLIC BLOOD PRESSURE: 100 MMHG

## 2020-04-05 VITALS — SYSTOLIC BLOOD PRESSURE: 96 MMHG | DIASTOLIC BLOOD PRESSURE: 49 MMHG

## 2020-04-05 VITALS — SYSTOLIC BLOOD PRESSURE: 111 MMHG | DIASTOLIC BLOOD PRESSURE: 55 MMHG

## 2020-04-05 VITALS — DIASTOLIC BLOOD PRESSURE: 58 MMHG | SYSTOLIC BLOOD PRESSURE: 90 MMHG

## 2020-04-05 VITALS — SYSTOLIC BLOOD PRESSURE: 108 MMHG | DIASTOLIC BLOOD PRESSURE: 52 MMHG

## 2020-04-05 VITALS — DIASTOLIC BLOOD PRESSURE: 32 MMHG | SYSTOLIC BLOOD PRESSURE: 66 MMHG

## 2020-04-05 VITALS — DIASTOLIC BLOOD PRESSURE: 46 MMHG | SYSTOLIC BLOOD PRESSURE: 87 MMHG

## 2020-04-05 VITALS — DIASTOLIC BLOOD PRESSURE: 55 MMHG | SYSTOLIC BLOOD PRESSURE: 105 MMHG

## 2020-04-05 VITALS — DIASTOLIC BLOOD PRESSURE: 71 MMHG | SYSTOLIC BLOOD PRESSURE: 112 MMHG

## 2020-04-05 VITALS — DIASTOLIC BLOOD PRESSURE: 49 MMHG | SYSTOLIC BLOOD PRESSURE: 100 MMHG

## 2020-04-05 VITALS — DIASTOLIC BLOOD PRESSURE: 52 MMHG | SYSTOLIC BLOOD PRESSURE: 86 MMHG

## 2020-04-05 VITALS — SYSTOLIC BLOOD PRESSURE: 93 MMHG | DIASTOLIC BLOOD PRESSURE: 56 MMHG

## 2020-04-05 VITALS — SYSTOLIC BLOOD PRESSURE: 117 MMHG | DIASTOLIC BLOOD PRESSURE: 52 MMHG

## 2020-04-05 VITALS — SYSTOLIC BLOOD PRESSURE: 89 MMHG | DIASTOLIC BLOOD PRESSURE: 51 MMHG

## 2020-04-05 VITALS — DIASTOLIC BLOOD PRESSURE: 54 MMHG | SYSTOLIC BLOOD PRESSURE: 117 MMHG

## 2020-04-05 VITALS — DIASTOLIC BLOOD PRESSURE: 54 MMHG | SYSTOLIC BLOOD PRESSURE: 111 MMHG

## 2020-04-05 VITALS — SYSTOLIC BLOOD PRESSURE: 95 MMHG | DIASTOLIC BLOOD PRESSURE: 51 MMHG

## 2020-04-05 VITALS — DIASTOLIC BLOOD PRESSURE: 48 MMHG | SYSTOLIC BLOOD PRESSURE: 121 MMHG

## 2020-04-05 VITALS — DIASTOLIC BLOOD PRESSURE: 78 MMHG | SYSTOLIC BLOOD PRESSURE: 107 MMHG

## 2020-04-05 VITALS — SYSTOLIC BLOOD PRESSURE: 66 MMHG | DIASTOLIC BLOOD PRESSURE: 34 MMHG

## 2020-04-05 VITALS — SYSTOLIC BLOOD PRESSURE: 102 MMHG | DIASTOLIC BLOOD PRESSURE: 76 MMHG

## 2020-04-05 VITALS — SYSTOLIC BLOOD PRESSURE: 73 MMHG | DIASTOLIC BLOOD PRESSURE: 45 MMHG

## 2020-04-05 VITALS — DIASTOLIC BLOOD PRESSURE: 43 MMHG | SYSTOLIC BLOOD PRESSURE: 86 MMHG

## 2020-04-05 VITALS — SYSTOLIC BLOOD PRESSURE: 92 MMHG | DIASTOLIC BLOOD PRESSURE: 47 MMHG

## 2020-04-05 VITALS — SYSTOLIC BLOOD PRESSURE: 69 MMHG | DIASTOLIC BLOOD PRESSURE: 37 MMHG

## 2020-04-05 VITALS — SYSTOLIC BLOOD PRESSURE: 95 MMHG | DIASTOLIC BLOOD PRESSURE: 75 MMHG

## 2020-04-05 VITALS — DIASTOLIC BLOOD PRESSURE: 45 MMHG | SYSTOLIC BLOOD PRESSURE: 85 MMHG

## 2020-04-05 VITALS — DIASTOLIC BLOOD PRESSURE: 69 MMHG | SYSTOLIC BLOOD PRESSURE: 106 MMHG

## 2020-04-05 VITALS — DIASTOLIC BLOOD PRESSURE: 55 MMHG | SYSTOLIC BLOOD PRESSURE: 101 MMHG

## 2020-04-05 VITALS — SYSTOLIC BLOOD PRESSURE: 113 MMHG | DIASTOLIC BLOOD PRESSURE: 54 MMHG

## 2020-04-05 VITALS — SYSTOLIC BLOOD PRESSURE: 118 MMHG | DIASTOLIC BLOOD PRESSURE: 79 MMHG

## 2020-04-05 VITALS — DIASTOLIC BLOOD PRESSURE: 52 MMHG | SYSTOLIC BLOOD PRESSURE: 104 MMHG

## 2020-04-05 VITALS — SYSTOLIC BLOOD PRESSURE: 147 MMHG | DIASTOLIC BLOOD PRESSURE: 92 MMHG

## 2020-04-05 LAB
BASOPHILS # BLD AUTO: 0 /CMM (ref 0–0.2)
BASOPHILS NFR BLD AUTO: 0.1 % (ref 0–2)
BUN SERPL-MCNC: 64 MG/DL (ref 7–18)
BUN SERPL-MCNC: 69 MG/DL (ref 7–18)
CALCIUM SERPL-MCNC: 8.9 MG/DL (ref 8.5–10.1)
CALCIUM SERPL-MCNC: 8.9 MG/DL (ref 8.5–10.1)
CHLORIDE SERPL-SCNC: 115 MMOL/L (ref 98–107)
CHLORIDE SERPL-SCNC: 115 MMOL/L (ref 98–107)
CO2 SERPL-SCNC: 33 MMOL/L (ref 21–32)
CO2 SERPL-SCNC: 33 MMOL/L (ref 21–32)
CREAT SERPL-MCNC: 2.7 MG/DL (ref 0.6–1.3)
CREAT SERPL-MCNC: 2.9 MG/DL (ref 0.6–1.3)
EOSINOPHIL NFR BLD AUTO: 0.1 % (ref 0–6)
EOSINOPHIL NFR BLD MANUAL: 1 % (ref 0–4)
GLUCOSE SERPL-MCNC: 110 MG/DL (ref 74–106)
GLUCOSE SERPL-MCNC: 112 MG/DL (ref 74–106)
HCT VFR BLD AUTO: 33 % (ref 39–51)
HGB BLD-MCNC: 10.1 G/DL (ref 13.5–17.5)
LYMPHOCYTES NFR BLD AUTO: 0.6 /CMM (ref 0.8–4.8)
LYMPHOCYTES NFR BLD AUTO: 3.5 % (ref 20–44)
LYMPHOCYTES NFR BLD MANUAL: 8 % (ref 16–48)
MAGNESIUM SERPL-MCNC: 2.8 MG/DL (ref 1.8–2.4)
MCHC RBC AUTO-ENTMCNC: 31 G/DL (ref 31–36)
MCV RBC AUTO: 93 FL (ref 80–96)
MONOCYTES NFR BLD AUTO: 0.4 /CMM (ref 0.1–1.3)
MONOCYTES NFR BLD AUTO: 2.4 % (ref 2–12)
MONOCYTES NFR BLD MANUAL: 1 % (ref 0–11)
NEUTROPHILS # BLD AUTO: 15.9 /CMM (ref 1.8–8.9)
NEUTROPHILS NFR BLD AUTO: 93.9 % (ref 43–81)
NEUTS BAND NFR BLD MANUAL: 3 % (ref 0–5)
NEUTS SEG NFR BLD MANUAL: 87 % (ref 42–76)
PHOSPHATE SERPL-MCNC: 5.8 MG/DL (ref 2.5–4.9)
PLATELET # BLD AUTO: 90 /CMM (ref 150–450)
POTASSIUM SERPL-SCNC: 6 MMOL/L (ref 3.5–5.1)
POTASSIUM SERPL-SCNC: 6.5 MMOL/L (ref 3.5–5.1)
PTH-INTACT SERPL-MCNC: 12 PG/ML (ref 15–65)
RBC # BLD AUTO: 3.58 MIL/UL (ref 4.5–6)
SODIUM SERPL-SCNC: 151 MMOL/L (ref 136–145)
SODIUM SERPL-SCNC: 152 MMOL/L (ref 136–145)
WBC NRBC COR # BLD AUTO: 16.9 K/UL (ref 4.3–11)

## 2020-04-05 RX ADMIN — LORAZEPAM PRN MG: 2 INJECTION INTRAMUSCULAR; INTRAVENOUS at 22:35

## 2020-04-05 RX ADMIN — METOCLOPRAMIDE HYDROCHLORIDE SCH MG: 5 SOLUTION ORAL at 11:19

## 2020-04-05 RX ADMIN — ACETYLCYSTEINE SCH MG: 100 INHALANT RESPIRATORY (INHALATION) at 07:50

## 2020-04-05 RX ADMIN — METOCLOPRAMIDE HYDROCHLORIDE SCH MG: 5 SOLUTION ORAL at 05:51

## 2020-04-05 RX ADMIN — PANTOPRAZOLE SODIUM SCH MG: 40 GRANULE, DELAYED RELEASE ORAL at 08:11

## 2020-04-05 RX ADMIN — SODIUM CHLORIDE SCH MLS/HR: 9 INJECTION, SOLUTION INTRAVENOUS at 18:01

## 2020-04-05 RX ADMIN — ALBUTEROL SULFATE SCH PUFF: 90 AEROSOL, METERED RESPIRATORY (INHALATION) at 17:27

## 2020-04-05 RX ADMIN — HYDROCORTISONE SODIUM SUCCINATE SCH MG: 100 INJECTION, POWDER, FOR SOLUTION INTRAMUSCULAR; INTRAVASCULAR at 08:11

## 2020-04-05 RX ADMIN — Medication SCH OZ: at 08:12

## 2020-04-05 RX ADMIN — ALBUTEROL SULFATE SCH PUFF: 90 AEROSOL, METERED RESPIRATORY (INHALATION) at 05:39

## 2020-04-05 RX ADMIN — SODIUM CHLORIDE PRN MLS/HR: 9 INJECTION, SOLUTION INTRAVENOUS at 07:52

## 2020-04-05 RX ADMIN — SODIUM CHLORIDE PRN MLS/HR: 9 INJECTION, SOLUTION INTRAVENOUS at 13:20

## 2020-04-05 RX ADMIN — HYDROCORTISONE SODIUM SUCCINATE SCH MG: 100 INJECTION, POWDER, FOR SOLUTION INTRAMUSCULAR; INTRAVASCULAR at 17:25

## 2020-04-05 RX ADMIN — LORAZEPAM PRN MG: 2 INJECTION INTRAMUSCULAR; INTRAVENOUS at 23:35

## 2020-04-05 RX ADMIN — ACETYLCYSTEINE SCH MG: 100 INHALANT RESPIRATORY (INHALATION) at 15:20

## 2020-04-05 RX ADMIN — SODIUM CHLORIDE PRN MLS/HR: 9 INJECTION, SOLUTION INTRAVENOUS at 04:06

## 2020-04-05 RX ADMIN — MUPIROCIN SCH APPLIC: 20 OINTMENT TOPICAL at 17:27

## 2020-04-05 RX ADMIN — MUPIROCIN SCH APPLIC: 20 OINTMENT TOPICAL at 08:12

## 2020-04-05 RX ADMIN — Medication PRN MG: at 21:59

## 2020-04-05 RX ADMIN — Medication PRN MG: at 22:59

## 2020-04-05 RX ADMIN — ALBUTEROL SULFATE SCH PUFF: 90 AEROSOL, METERED RESPIRATORY (INHALATION) at 00:00

## 2020-04-05 RX ADMIN — ALBUTEROL SULFATE SCH PUFF: 90 AEROSOL, METERED RESPIRATORY (INHALATION) at 11:20

## 2020-04-05 RX ADMIN — METOCLOPRAMIDE HYDROCHLORIDE SCH MG: 5 SOLUTION ORAL at 17:26

## 2020-04-05 NOTE — NUR
ICU RN

PT WAS ON COMFORT MEASURES ONLY. PT WAS COMATOSE, NONRESPONSIVE TO ANY STIMULI. PT WENT TO 
SEVERE BRADYCARDIA & FINALLY TO ASYSTOLE. NO HEART BEATS OR PULSES ON MAJOR ARTERIES. NO 
BREATH SOUNDS. PUPILS ARE FIXED & DILATED, NO CORNEAL OR GAG REFLEXES. EKG-STRAIGHT LINE. PT 
WAS PRONOUNCED DEAD @ 23.400. 04.05.2020 BY ICU CHARGE RN ED TER-ARSENIAN.

## 2020-04-05 NOTE — NUR
RN NOTES 

PT STILL ON BIPAP, RESPONDS TO DEEP PAIN FULL STIMULI ONLY,  O2 SAT 99% , LEVO AT .1 
MCG/KG/MIN AND  IVF NS AT 200CC/HR RUNNING VIA L UPPER ARM MIDLINE, NGT TO LIS, TOLAL  
CC DARK GREENISH GASTRIC DRAINAGE OBTAIN ON THIS SHIFT, SR UP x3, CALL LIGHT WITHIN EASY 
REACH, BED LOCKED AND IN LOWEST POSITION, WILL ENDORSE TO NIGHT SHIFT NURSE FOR CONTINUITY 
OF CARE .

## 2020-04-05 NOTE — NUR
PATIENT WAS BRADYCARDIA AND BP AND O2 WAS LOW AND FINALLY ASYSTOLE @ 2340.



NURSING SUPERVISOR ANISA CARIAS, WAS NOTIFIED AT 2345, ADMITTING, VIJAY WAS NOTIFIED AT 
2345, FAMILY MEMBER MARII CASTRO WAS NOTIFIED AT 2345 (FAMILY PHONE NUMBER, [phone]). 
ARLEEN WAS CALLED AND TALKED TO [facility], CASE NUMBER [id number].

PATIENT WAS CLEANED AND READY TO RETRANSFER WITH THE HELP OF CHARGE NURSE MALATHI ESCALONA.

## 2020-04-05 NOTE — NUR
ALL MEDICATIONS DISCONTINUED AND COMFORT CARE STARTED, MORPHINE AND ATIVAN WAS ORDERED, WILL 
CONTINUE TO MONITOR THE PATIENT CLOSELY.

## 2020-04-05 NOTE — NUR
RN NOTES 

PHARMACY NOITFED REGARDING , A NEED FOR A NEW BAG OF LEVO GTT TWICE , NO NEW BAG AVAILABLE 
YET ,

## 2020-04-05 NOTE — NUR
ICU RN NOTES  



RECEIVED PATIENT ON BED, SLEEPING, RESPONDS TO PAINFUL STIMULI, ON BIPAP, O2 SAT 98%, 
INITIAL ASSESSMENT DONE. CARDIAC MONITOR SHOWING A.FIB. HR IN `100'S, NGT TO LIS WITH DARK 
GREENISH STOMACH DRAINAGE , KRAUSE DRAINING TO GRAVITY, WITH SMALL AMOUNT OF DARK ERVIN COLOR 
URINE ,  HOB ELEVATED. EDEMA +3 PITTING ON ALL UPPER AND LOWER EXTREMITIES. ELEVATED ON 
PILLOWS , WILL CONTINUE TO MONITOR PATIENT CLOSELY.

## 2020-04-05 NOTE — NUR
ICU RN NOTES



CHARGE NURSE CONTACTED PATIENTS FAMILY MEMBER AND THEY DECIDED TO CONTINUE COMFORT FOR THE 
PATIENT. DR GUO AND DR MATUTE NOTIFIED. 

-------------------------------------------------------------------------------

Addendum: 04/06/20 at 0531 by GERBER SANDERSON RN

-------------------------------------------------------------------------------

MEANT TO SAY  "START COMFORT CARE" PATIENT WAS DNR/DNI.

## 2020-04-05 NOTE — NUR
RN NOTES 

DR GUO NOTIFED REGARDING  K=6.5 AND URINE OUTPUT 80 CC FOR LAST 12 HOURS ON THIS 
SHIFT, NO NEW ORDER RECEIVED.

## 2020-04-05 NOTE — NUR
icu rn. pt heart rate  went on and off 160. this morning 0630. then went down 109, will 
continue to monitor vitals.

## 2020-04-05 NOTE — NUR
RN NOTES 

RECEIVED PT ON BED, DOES NOT FOLLOW COMMAND, RESPONDS TO PAINFUL STIMULI, ON BIPAP, O2 SAT 
100%, INITIAL ASSESSMENT. RECEIVED THE PT REST ON THE BED, BIPAP ON. PT IS NOT RESPONDING 
PAIN. CARDIAC MONITOR SHOWING A.FIB. HR IN `100'S, NGT TO LIS WITH DARK GREENISH STOMACH 
DRAINAGE , KRAUSE DRAINING TO GRAVITY, WITH SMALL AMOUNT OF DARK ERVIN COLOR URINE ,  HOB 
ELEVATED.  PAT HANDS WEEPING, ELEVATED ON PILLOWS , WILL CONTINUE TO MONITOR PT CLOSELY.

## 2020-04-06 LAB
ALBUMIN SERPL ELPH-MCNC: 1.3 G/DL (ref 2.9–4.4)
ALBUMIN/GLOB SERPL: 0.4 {RATIO} (ref 0.7–1.7)
ALPHA1 GLOB SERPL ELPH-MCNC: 0.4 G/DL (ref 0–0.4)
ALPHA2 GLOB SERPL ELPH-MCNC: 1.1 G/DL (ref 0.4–1)
B-GLOBULIN SERPL ELPH-MCNC: 0.6 G/DL (ref 0.7–1.3)
GAMMA GLOB SERPL ELPH-MCNC: 1.6 G/DL (ref 0.4–1.8)
GLOBULIN SER CALC-MCNC: 3.6 G/DL (ref 2.2–3.9)
M PROTEIN SERPL ELPH-MCNC: 0.4 G/DL

## 2023-01-04 NOTE — NUR
RN CLOSING NOTES



PT IS RESTING IN BED COMFORTABLY AT THIS TIME. PT IS ON DIPRIVAN DRIP AT 40 MCG/MIN, 
TOLERATING WELL, PT IS SEDATED WITH NO AGITATION. ETT TUBE IS PATENT AND INTACT, MECH VENT, 
TOLERATING SETTINGS WELL. BILATERAL SOFT WRIST RESTRAINTS INTACT. NO ACUTE CHANGES OCCURRED 
THROUGHOUT THE SHIFT, VITAL SIGNS ARE STABLE, PT NEEDS HAVE BEEN MET. SAFETY MEASURES HAVE 
BEEN IMPLEMENTED, CALL LIGHT IS WITHIN REACH, BED IS IN LOWEST AND LOCKED POSITION, SIDE 
RAILS UP X2, PT HAS BEEN ENDORSED TO NIGHTSHIFT RN FOR TREVOR. Plastic Surgeon Procedure Text (A): After obtaining clear surgical margins the patient was sent to plastics for surgical repair.  The patient understands they will receive post-surgical care and follow-up from the referring physician's office.